# Patient Record
Sex: FEMALE | Race: WHITE | NOT HISPANIC OR LATINO
[De-identification: names, ages, dates, MRNs, and addresses within clinical notes are randomized per-mention and may not be internally consistent; named-entity substitution may affect disease eponyms.]

---

## 2020-07-27 ENCOUNTER — APPOINTMENT (OUTPATIENT)
Dept: THORACIC SURGERY | Facility: CLINIC | Age: 85
End: 2020-07-27
Payer: MEDICARE

## 2020-07-27 DIAGNOSIS — Z82.49 FAMILY HISTORY OF ISCHEMIC HEART DISEASE AND OTHER DISEASES OF THE CIRCULATORY SYSTEM: ICD-10-CM

## 2020-07-27 DIAGNOSIS — Z60.2 PROBLEMS RELATED TO LIVING ALONE: ICD-10-CM

## 2020-07-27 DIAGNOSIS — I10 ESSENTIAL (PRIMARY) HYPERTENSION: ICD-10-CM

## 2020-07-27 DIAGNOSIS — S22.39XA FRACTURE OF ONE RIB, UNSPECIFIED SIDE, INITIAL ENCOUNTER FOR CLOSED FRACTURE: ICD-10-CM

## 2020-07-27 DIAGNOSIS — Z85.3 PERSONAL HISTORY OF MALIGNANT NEOPLASM OF BREAST: ICD-10-CM

## 2020-07-27 DIAGNOSIS — Z87.891 PERSONAL HISTORY OF NICOTINE DEPENDENCE: ICD-10-CM

## 2020-07-27 DIAGNOSIS — Z86.39 PERSONAL HISTORY OF OTHER ENDOCRINE, NUTRITIONAL AND METABOLIC DISEASE: ICD-10-CM

## 2020-07-27 PROCEDURE — 99443: CPT

## 2020-07-27 RX ORDER — ATORVASTATIN CALCIUM 20 MG/1
20 TABLET, FILM COATED ORAL
Refills: 0 | Status: ACTIVE | COMMUNITY

## 2020-07-27 RX ORDER — LISINOPRIL 10 MG/1
10 TABLET ORAL
Refills: 0 | Status: ACTIVE | COMMUNITY

## 2020-07-27 RX ORDER — DILTIAZEM HYDROCHLORIDE 300 MG/1
300 TABLET, EXTENDED RELEASE ORAL
Refills: 0 | Status: ACTIVE | COMMUNITY

## 2020-07-27 SDOH — SOCIAL STABILITY - SOCIAL INSECURITY: PROBLEMS RELATED TO LIVING ALONE: Z60.2

## 2021-06-09 ENCOUNTER — NON-APPOINTMENT (OUTPATIENT)
Age: 86
End: 2021-06-09

## 2021-06-09 ENCOUNTER — APPOINTMENT (OUTPATIENT)
Dept: OPHTHALMOLOGY | Facility: CLINIC | Age: 86
End: 2021-06-09
Payer: MEDICARE

## 2021-06-09 PROCEDURE — 92134 CPTRZ OPH DX IMG PST SGM RTA: CPT

## 2021-06-09 PROCEDURE — 67028 INJECTION EYE DRUG: CPT | Mod: LT

## 2021-06-09 PROCEDURE — 99072 ADDL SUPL MATRL&STAF TM PHE: CPT

## 2021-07-15 ENCOUNTER — NON-APPOINTMENT (OUTPATIENT)
Age: 86
End: 2021-07-15

## 2021-07-15 ENCOUNTER — APPOINTMENT (OUTPATIENT)
Dept: OPHTHALMOLOGY | Facility: CLINIC | Age: 86
End: 2021-07-15
Payer: MEDICARE

## 2021-07-15 PROCEDURE — 99072 ADDL SUPL MATRL&STAF TM PHE: CPT

## 2021-07-15 PROCEDURE — 67028 INJECTION EYE DRUG: CPT | Mod: LT

## 2021-09-09 ENCOUNTER — NON-APPOINTMENT (OUTPATIENT)
Age: 86
End: 2021-09-09

## 2021-09-09 ENCOUNTER — APPOINTMENT (OUTPATIENT)
Dept: OPHTHALMOLOGY | Facility: CLINIC | Age: 86
End: 2021-09-09
Payer: MEDICARE

## 2021-09-09 PROCEDURE — 92134 CPTRZ OPH DX IMG PST SGM RTA: CPT

## 2021-09-09 PROCEDURE — 67028 INJECTION EYE DRUG: CPT | Mod: LT

## 2021-10-14 ENCOUNTER — APPOINTMENT (OUTPATIENT)
Dept: OPHTHALMOLOGY | Facility: CLINIC | Age: 86
End: 2021-10-14
Payer: MEDICARE

## 2021-10-14 ENCOUNTER — NON-APPOINTMENT (OUTPATIENT)
Age: 86
End: 2021-10-14

## 2021-10-14 PROCEDURE — 92012 INTRM OPH EXAM EST PATIENT: CPT

## 2021-10-14 PROCEDURE — 92134 CPTRZ OPH DX IMG PST SGM RTA: CPT

## 2021-12-02 ENCOUNTER — NON-APPOINTMENT (OUTPATIENT)
Age: 86
End: 2021-12-02

## 2021-12-02 ENCOUNTER — APPOINTMENT (OUTPATIENT)
Dept: OPHTHALMOLOGY | Facility: CLINIC | Age: 86
End: 2021-12-02
Payer: MEDICARE

## 2021-12-02 PROCEDURE — 67028 INJECTION EYE DRUG: CPT | Mod: LT

## 2021-12-02 PROCEDURE — 92134 CPTRZ OPH DX IMG PST SGM RTA: CPT

## 2022-01-13 ENCOUNTER — NON-APPOINTMENT (OUTPATIENT)
Age: 87
End: 2022-01-13

## 2022-01-13 ENCOUNTER — APPOINTMENT (OUTPATIENT)
Dept: OPHTHALMOLOGY | Facility: CLINIC | Age: 87
End: 2022-01-13
Payer: MEDICARE

## 2022-01-13 PROCEDURE — 92134 CPTRZ OPH DX IMG PST SGM RTA: CPT

## 2022-01-13 PROCEDURE — 67028 INJECTION EYE DRUG: CPT | Mod: LT

## 2022-02-17 ENCOUNTER — APPOINTMENT (OUTPATIENT)
Dept: OPHTHALMOLOGY | Facility: CLINIC | Age: 87
End: 2022-02-17
Payer: MEDICARE

## 2022-02-17 ENCOUNTER — NON-APPOINTMENT (OUTPATIENT)
Age: 87
End: 2022-02-17

## 2022-02-17 PROCEDURE — 67028 INJECTION EYE DRUG: CPT | Mod: LT

## 2022-02-17 PROCEDURE — 92134 CPTRZ OPH DX IMG PST SGM RTA: CPT

## 2022-03-31 ENCOUNTER — NON-APPOINTMENT (OUTPATIENT)
Age: 87
End: 2022-03-31

## 2022-03-31 ENCOUNTER — APPOINTMENT (OUTPATIENT)
Dept: OPHTHALMOLOGY | Facility: CLINIC | Age: 87
End: 2022-03-31
Payer: MEDICARE

## 2022-03-31 PROCEDURE — 67028 INJECTION EYE DRUG: CPT | Mod: LT

## 2022-03-31 PROCEDURE — 92134 CPTRZ OPH DX IMG PST SGM RTA: CPT

## 2022-05-12 ENCOUNTER — NON-APPOINTMENT (OUTPATIENT)
Age: 87
End: 2022-05-12

## 2022-05-12 ENCOUNTER — APPOINTMENT (OUTPATIENT)
Dept: OPHTHALMOLOGY | Facility: CLINIC | Age: 87
End: 2022-05-12
Payer: MEDICARE

## 2022-05-12 PROCEDURE — 92134 CPTRZ OPH DX IMG PST SGM RTA: CPT

## 2022-05-12 PROCEDURE — 67028 INJECTION EYE DRUG: CPT | Mod: LT

## 2022-06-23 ENCOUNTER — NON-APPOINTMENT (OUTPATIENT)
Age: 87
End: 2022-06-23

## 2022-06-23 ENCOUNTER — APPOINTMENT (OUTPATIENT)
Dept: OPHTHALMOLOGY | Facility: CLINIC | Age: 87
End: 2022-06-23
Payer: MEDICARE

## 2022-06-23 PROCEDURE — 92134 CPTRZ OPH DX IMG PST SGM RTA: CPT

## 2022-06-23 PROCEDURE — 67028 INJECTION EYE DRUG: CPT | Mod: LT

## 2022-07-28 ENCOUNTER — NON-APPOINTMENT (OUTPATIENT)
Age: 87
End: 2022-07-28

## 2022-07-28 ENCOUNTER — APPOINTMENT (OUTPATIENT)
Dept: OPHTHALMOLOGY | Facility: CLINIC | Age: 87
End: 2022-07-28
Payer: MEDICARE

## 2022-07-28 PROCEDURE — 67028 INJECTION EYE DRUG: CPT | Mod: LT

## 2022-07-28 PROCEDURE — 92134 CPTRZ OPH DX IMG PST SGM RTA: CPT

## 2022-09-01 ENCOUNTER — NON-APPOINTMENT (OUTPATIENT)
Age: 87
End: 2022-09-01

## 2022-09-01 ENCOUNTER — APPOINTMENT (OUTPATIENT)
Dept: OPHTHALMOLOGY | Facility: CLINIC | Age: 87
End: 2022-09-01
Payer: MEDICARE

## 2022-09-01 PROCEDURE — 92134 CPTRZ OPH DX IMG PST SGM RTA: CPT

## 2022-09-01 PROCEDURE — 67028 INJECTION EYE DRUG: CPT | Mod: LT

## 2022-10-06 ENCOUNTER — NON-APPOINTMENT (OUTPATIENT)
Age: 87
End: 2022-10-06

## 2022-10-06 ENCOUNTER — APPOINTMENT (OUTPATIENT)
Dept: OPHTHALMOLOGY | Facility: CLINIC | Age: 87
End: 2022-10-06

## 2022-10-06 PROCEDURE — 67028 INJECTION EYE DRUG: CPT | Mod: LT

## 2022-10-06 PROCEDURE — 92134 CPTRZ OPH DX IMG PST SGM RTA: CPT

## 2022-11-03 ENCOUNTER — APPOINTMENT (OUTPATIENT)
Dept: OPHTHALMOLOGY | Facility: CLINIC | Age: 87
End: 2022-11-03

## 2022-11-03 ENCOUNTER — NON-APPOINTMENT (OUTPATIENT)
Age: 87
End: 2022-11-03

## 2022-11-03 PROCEDURE — 67028 INJECTION EYE DRUG: CPT | Mod: LT

## 2022-11-03 PROCEDURE — 92134 CPTRZ OPH DX IMG PST SGM RTA: CPT

## 2022-12-08 ENCOUNTER — APPOINTMENT (OUTPATIENT)
Dept: OPHTHALMOLOGY | Facility: CLINIC | Age: 87
End: 2022-12-08

## 2022-12-08 ENCOUNTER — NON-APPOINTMENT (OUTPATIENT)
Age: 87
End: 2022-12-08

## 2022-12-08 PROCEDURE — 92134 CPTRZ OPH DX IMG PST SGM RTA: CPT

## 2022-12-08 PROCEDURE — 67028 INJECTION EYE DRUG: CPT | Mod: LT

## 2023-01-12 ENCOUNTER — APPOINTMENT (OUTPATIENT)
Dept: OPHTHALMOLOGY | Facility: CLINIC | Age: 88
End: 2023-01-12
Payer: MEDICARE

## 2023-01-12 ENCOUNTER — NON-APPOINTMENT (OUTPATIENT)
Age: 88
End: 2023-01-12

## 2023-01-12 PROCEDURE — 92134 CPTRZ OPH DX IMG PST SGM RTA: CPT

## 2023-01-12 PROCEDURE — 67028 INJECTION EYE DRUG: CPT | Mod: LT

## 2023-02-16 ENCOUNTER — NON-APPOINTMENT (OUTPATIENT)
Age: 88
End: 2023-02-16

## 2023-02-16 ENCOUNTER — APPOINTMENT (OUTPATIENT)
Dept: OPHTHALMOLOGY | Facility: CLINIC | Age: 88
End: 2023-02-16
Payer: MEDICARE

## 2023-02-16 PROCEDURE — 92134 CPTRZ OPH DX IMG PST SGM RTA: CPT

## 2023-02-16 PROCEDURE — 92014 COMPRE OPH EXAM EST PT 1/>: CPT

## 2023-03-16 ENCOUNTER — APPOINTMENT (OUTPATIENT)
Dept: OPHTHALMOLOGY | Facility: CLINIC | Age: 88
End: 2023-03-16
Payer: MEDICARE

## 2023-03-16 ENCOUNTER — NON-APPOINTMENT (OUTPATIENT)
Age: 88
End: 2023-03-16

## 2023-03-16 PROCEDURE — 92134 CPTRZ OPH DX IMG PST SGM RTA: CPT

## 2023-03-16 PROCEDURE — 67028 INJECTION EYE DRUG: CPT | Mod: LT

## 2023-04-27 ENCOUNTER — NON-APPOINTMENT (OUTPATIENT)
Age: 88
End: 2023-04-27

## 2023-04-27 ENCOUNTER — APPOINTMENT (OUTPATIENT)
Dept: OPHTHALMOLOGY | Facility: CLINIC | Age: 88
End: 2023-04-27
Payer: MEDICARE

## 2023-04-27 PROCEDURE — 92134 CPTRZ OPH DX IMG PST SGM RTA: CPT

## 2023-04-27 PROCEDURE — 92012 INTRM OPH EXAM EST PATIENT: CPT

## 2023-05-09 ENCOUNTER — INPATIENT (INPATIENT)
Facility: HOSPITAL | Age: 88
LOS: 6 days | Discharge: NURSING, REHAB OR LONG-TERM CARE | DRG: 291 | End: 2023-05-16
Attending: HOSPITALIST | Admitting: FAMILY MEDICINE
Payer: MEDICARE

## 2023-05-09 VITALS
DIASTOLIC BLOOD PRESSURE: 98 MMHG | OXYGEN SATURATION: 98 % | SYSTOLIC BLOOD PRESSURE: 150 MMHG | WEIGHT: 130.07 LBS | HEIGHT: 64 IN | RESPIRATION RATE: 20 BRPM | HEART RATE: 145 BPM | TEMPERATURE: 98 F

## 2023-05-09 DIAGNOSIS — I50.9 HEART FAILURE, UNSPECIFIED: ICD-10-CM

## 2023-05-09 DIAGNOSIS — Z90.12 ACQUIRED ABSENCE OF LEFT BREAST AND NIPPLE: Chronic | ICD-10-CM

## 2023-05-09 LAB
ALBUMIN SERPL ELPH-MCNC: 4.2 G/DL — SIGNIFICANT CHANGE UP (ref 3.3–5.2)
ALP SERPL-CCNC: 90 U/L — SIGNIFICANT CHANGE UP (ref 40–120)
ALT FLD-CCNC: 41 U/L — HIGH
ANION GAP SERPL CALC-SCNC: 15 MMOL/L — SIGNIFICANT CHANGE UP (ref 5–17)
AST SERPL-CCNC: 26 U/L — SIGNIFICANT CHANGE UP
BASOPHILS # BLD AUTO: 0.04 K/UL — SIGNIFICANT CHANGE UP (ref 0–0.2)
BASOPHILS NFR BLD AUTO: 0.6 % — SIGNIFICANT CHANGE UP (ref 0–2)
BILIRUB SERPL-MCNC: 1.1 MG/DL — SIGNIFICANT CHANGE UP (ref 0.4–2)
BUN SERPL-MCNC: 25.8 MG/DL — HIGH (ref 8–20)
CALCIUM SERPL-MCNC: 9 MG/DL — SIGNIFICANT CHANGE UP (ref 8.4–10.5)
CHLORIDE SERPL-SCNC: 103 MMOL/L — SIGNIFICANT CHANGE UP (ref 96–108)
CO2 SERPL-SCNC: 22 MMOL/L — SIGNIFICANT CHANGE UP (ref 22–29)
CREAT SERPL-MCNC: 0.97 MG/DL — SIGNIFICANT CHANGE UP (ref 0.5–1.3)
EGFR: 55 ML/MIN/1.73M2 — LOW
EOSINOPHIL # BLD AUTO: 0.03 K/UL — SIGNIFICANT CHANGE UP (ref 0–0.5)
EOSINOPHIL NFR BLD AUTO: 0.5 % — SIGNIFICANT CHANGE UP (ref 0–6)
GLUCOSE SERPL-MCNC: 109 MG/DL — HIGH (ref 70–99)
HCT VFR BLD CALC: 43.1 % — SIGNIFICANT CHANGE UP (ref 34.5–45)
HGB BLD-MCNC: 14.1 G/DL — SIGNIFICANT CHANGE UP (ref 11.5–15.5)
IMM GRANULOCYTES NFR BLD AUTO: 0.6 % — SIGNIFICANT CHANGE UP (ref 0–0.9)
LYMPHOCYTES # BLD AUTO: 1.74 K/UL — SIGNIFICANT CHANGE UP (ref 1–3.3)
LYMPHOCYTES # BLD AUTO: 27.4 % — SIGNIFICANT CHANGE UP (ref 13–44)
MAGNESIUM SERPL-MCNC: 2 MG/DL — SIGNIFICANT CHANGE UP (ref 1.6–2.6)
MCHC RBC-ENTMCNC: 28.4 PG — SIGNIFICANT CHANGE UP (ref 27–34)
MCHC RBC-ENTMCNC: 32.7 GM/DL — SIGNIFICANT CHANGE UP (ref 32–36)
MCV RBC AUTO: 86.9 FL — SIGNIFICANT CHANGE UP (ref 80–100)
MONOCYTES # BLD AUTO: 0.61 K/UL — SIGNIFICANT CHANGE UP (ref 0–0.9)
MONOCYTES NFR BLD AUTO: 9.6 % — SIGNIFICANT CHANGE UP (ref 2–14)
NEUTROPHILS # BLD AUTO: 3.88 K/UL — SIGNIFICANT CHANGE UP (ref 1.8–7.4)
NEUTROPHILS NFR BLD AUTO: 61.3 % — SIGNIFICANT CHANGE UP (ref 43–77)
NT-PROBNP SERPL-SCNC: 4798 PG/ML — HIGH (ref 0–300)
PLATELET # BLD AUTO: 125 K/UL — LOW (ref 150–400)
POTASSIUM SERPL-MCNC: 4.2 MMOL/L — SIGNIFICANT CHANGE UP (ref 3.5–5.3)
POTASSIUM SERPL-SCNC: 4.2 MMOL/L — SIGNIFICANT CHANGE UP (ref 3.5–5.3)
PROT SERPL-MCNC: 6.5 G/DL — LOW (ref 6.6–8.7)
RAPID RVP RESULT: SIGNIFICANT CHANGE UP
RBC # BLD: 4.96 M/UL — SIGNIFICANT CHANGE UP (ref 3.8–5.2)
RBC # FLD: 14.8 % — HIGH (ref 10.3–14.5)
SARS-COV-2 RNA SPEC QL NAA+PROBE: SIGNIFICANT CHANGE UP
SODIUM SERPL-SCNC: 140 MMOL/L — SIGNIFICANT CHANGE UP (ref 135–145)
TROPONIN T SERPL-MCNC: <0.01 NG/ML — SIGNIFICANT CHANGE UP (ref 0–0.06)
TSH SERPL-MCNC: 1.5 UIU/ML — SIGNIFICANT CHANGE UP (ref 0.27–4.2)
WBC # BLD: 6.34 K/UL — SIGNIFICANT CHANGE UP (ref 3.8–10.5)
WBC # FLD AUTO: 6.34 K/UL — SIGNIFICANT CHANGE UP (ref 3.8–10.5)

## 2023-05-09 PROCEDURE — 93010 ELECTROCARDIOGRAM REPORT: CPT | Mod: 76

## 2023-05-09 PROCEDURE — 71045 X-RAY EXAM CHEST 1 VIEW: CPT | Mod: 26

## 2023-05-09 PROCEDURE — 99223 1ST HOSP IP/OBS HIGH 75: CPT

## 2023-05-09 PROCEDURE — G1004: CPT

## 2023-05-09 PROCEDURE — 99285 EMERGENCY DEPT VISIT HI MDM: CPT

## 2023-05-09 PROCEDURE — 71275 CT ANGIOGRAPHY CHEST: CPT | Mod: 26,MG

## 2023-05-09 RX ORDER — ATORVASTATIN CALCIUM 80 MG/1
20 TABLET, FILM COATED ORAL AT BEDTIME
Refills: 0 | Status: DISCONTINUED | OUTPATIENT
Start: 2023-05-09 | End: 2023-05-16

## 2023-05-09 RX ORDER — LISINOPRIL 2.5 MG/1
10 TABLET ORAL DAILY
Refills: 0 | Status: DISCONTINUED | OUTPATIENT
Start: 2023-05-10 | End: 2023-05-10

## 2023-05-09 RX ORDER — DILTIAZEM HCL 120 MG
90 CAPSULE, EXT RELEASE 24 HR ORAL ONCE
Refills: 0 | Status: COMPLETED | OUTPATIENT
Start: 2023-05-09 | End: 2023-05-09

## 2023-05-09 RX ORDER — LISINOPRIL 2.5 MG/1
0 TABLET ORAL
Refills: 0 | DISCHARGE

## 2023-05-09 RX ORDER — DILTIAZEM HCL 120 MG
0 CAPSULE, EXT RELEASE 24 HR ORAL
Refills: 0 | DISCHARGE

## 2023-05-09 RX ORDER — ACETAMINOPHEN 500 MG
650 TABLET ORAL EVERY 6 HOURS
Refills: 0 | Status: DISCONTINUED | OUTPATIENT
Start: 2023-05-09 | End: 2023-05-16

## 2023-05-09 RX ORDER — FUROSEMIDE 40 MG
40 TABLET ORAL ONCE
Refills: 0 | Status: COMPLETED | OUTPATIENT
Start: 2023-05-09 | End: 2023-05-09

## 2023-05-09 RX ORDER — DILTIAZEM HCL 120 MG
180 CAPSULE, EXT RELEASE 24 HR ORAL DAILY
Refills: 0 | Status: DISCONTINUED | OUTPATIENT
Start: 2023-05-10 | End: 2023-05-13

## 2023-05-09 RX ORDER — DILTIAZEM HCL 120 MG
10 CAPSULE, EXT RELEASE 24 HR ORAL ONCE
Refills: 0 | Status: COMPLETED | OUTPATIENT
Start: 2023-05-09 | End: 2023-05-09

## 2023-05-09 RX ORDER — TRAMADOL HYDROCHLORIDE 50 MG/1
1 TABLET ORAL
Refills: 0 | DISCHARGE

## 2023-05-09 RX ORDER — METOPROLOL TARTRATE 50 MG
0 TABLET ORAL
Refills: 0 | DISCHARGE

## 2023-05-09 RX ORDER — APIXABAN 2.5 MG/1
2.5 TABLET, FILM COATED ORAL
Refills: 0 | Status: DISCONTINUED | OUTPATIENT
Start: 2023-05-09 | End: 2023-05-16

## 2023-05-09 RX ORDER — ONDANSETRON 8 MG/1
4 TABLET, FILM COATED ORAL EVERY 6 HOURS
Refills: 0 | Status: DISCONTINUED | OUTPATIENT
Start: 2023-05-09 | End: 2023-05-16

## 2023-05-09 RX ORDER — IPRATROPIUM/ALBUTEROL SULFATE 18-103MCG
3 AEROSOL WITH ADAPTER (GRAM) INHALATION EVERY 6 HOURS
Refills: 0 | Status: DISCONTINUED | OUTPATIENT
Start: 2023-05-09 | End: 2023-05-16

## 2023-05-09 RX ORDER — METOPROLOL TARTRATE 50 MG
50 TABLET ORAL DAILY
Refills: 0 | Status: DISCONTINUED | OUTPATIENT
Start: 2023-05-09 | End: 2023-05-10

## 2023-05-09 RX ORDER — FUROSEMIDE 40 MG
40 TABLET ORAL EVERY 12 HOURS
Refills: 0 | Status: DISCONTINUED | OUTPATIENT
Start: 2023-05-10 | End: 2023-05-10

## 2023-05-09 RX ADMIN — Medication 40 MILLIGRAM(S): at 16:34

## 2023-05-09 RX ADMIN — Medication 10 MILLIGRAM(S): at 13:39

## 2023-05-09 RX ADMIN — Medication 50 MILLIGRAM(S): at 20:11

## 2023-05-09 RX ADMIN — Medication 90 MILLIGRAM(S): at 14:04

## 2023-05-09 RX ADMIN — ATORVASTATIN CALCIUM 20 MILLIGRAM(S): 80 TABLET, FILM COATED ORAL at 22:24

## 2023-05-09 NOTE — H&P ADULT - NSHPPHYSICALEXAM_GEN_ALL_CORE
Vital Signs  T(C): 36.6 (09 May 2023 12:29), Max: 36.6 (09 May 2023 12:29)  T(F): 97.8 (09 May 2023 12:29), Max: 97.8 (09 May 2023 12:29)  HR: 72 (09 May 2023 16:50) (72 - 145)  BP: 145/78 (09 May 2023 16:50) (138/90 - 150/98)  RR: 22 (09 May 2023 16:50) (20 - 22)  SpO2: 95% (09 May 2023 16:50) (95% - 98%)  Parameters below as of 09 May 2023 16:50  Patient On (Oxygen Delivery Method): room air  General: Elderly female sitting in bed comfortably. No acute distress  HEENT: EOMI. Clear conjunctivae. Moist mucus membrane  Neck: Supple.   Chest: Good air entry. Bibasilar rales. No wheezing or rhonchi.   Heart: S1 & S2 with irregular rhythm.   Abdomen: Obese. Soft. Non-tender. + BS  Ext: 1+ pedal edema. No calf tenderness   Neuro: AAO x 3. No focal deficit. No speech disorder.  Skin: Warm and Dry  Psychiatry: Normal mood and affect

## 2023-05-09 NOTE — ED ADULT NURSE NOTE - OBJECTIVE STATEMENT
pt comes to ED with reports of SOB x 2 weeks, bilat lower extremity edema noted to ankles on arrival. pt denies chest pain. pt skin warm and dry, afebrile rectally. tachycardic and tachypneic on arrival. pt with history of AF, on Eliquis.

## 2023-05-09 NOTE — ED ADULT TRIAGE NOTE - CHIEF COMPLAINT QUOTE
Pt send from cardiologist office for rapid HR reading 145 in the office. Hx of Rapid afib on Eliquis and Metoprolol. Pt taken to [facility] for further eval and treatement

## 2023-05-09 NOTE — H&P ADULT - NSHPLABSRESULTS_GEN_ALL_CORE
LABS:                        14.1   6.34  )-----------( 125      ( 09 May 2023 12:50 )             43.1     05-09    140  |  103  |  25.8<H>  ----------------------------<  109<H>  4.2   |  22.0  |  0.97    Ca    9.0      09 May 2023 12:50  Mg     2.0     05-09    TPro  6.5<L>  /  Alb  4.2  /  TBili  1.1  /  DBili  x   /  AST  26  /  ALT  41<H>  /  AlkPhos  90  05-09    CARDIAC MARKERS ( 09 May 2023 12:50 )  x     / <0.01 ng/mL / x     / x     / x         Xray Chest 1 View- PORTABLE-Urgent (Xray Chest 1 View- PORTABLE-Urgent .) (05.09.23 @ 13:33)     Mild CHF and small bilateral pleural effusions/atelectasis.   Cardiomegaly.    CT Angio Chest PE Protocol w/ IV Cont (05.09.23 @ 16:00)     1.  No pulmonary embolus.  2.  I lateral pleural effusions and lower lobe passive atelectasis.  3.  The right adrenal nodule is indeterminate. Characterization using MRI without and with contrast is recommended after the acute symptoms resolve.

## 2023-05-09 NOTE — H&P ADULT - ASSESSMENT
INCOMPLETE 89 year old female with history of Chronic A. Fib on Eliquis, HTN, HLD and Breast Cancer s/p Left Mastectomy sent by PMD for evaluation. As per patient, she had Flu 2-3 weeks ago and since then she has been having exertional dyspnea, orthopnea and paroxysmal nocturnal dyspnea. Denies lower extremity swelling. Denies chest pain however she has noticed palpitations at times. Today, she went to her PMD and was found to be in Rapid A. Fib so she was sent to the hospital.   In ER, she was found to be in Acute CHF with Rapid A. Fib with RVR. She was given Cardizem 10 mg IVP x 1, Cardizem 90 mg PO x 1 and Lasix 40 mg x 1.     1) Acute Heart Failure  - Trend cardiac enzymes   - ECHO   - Strict intake/output and daily weight  - Lasix 40 mg IVP BID  - Continue Lisinopril and Metoprolol  - Cardiology was consulted by ED    2) Chronic A. Fib with RVR  - Resume Cardizem  mg daily   - Resume Metoprolol ER 50 mg daily  - Resume Eliquis 2.5 mg BID  - Cardiology Consult     3) HTN  - Resume Lisinopril, Metoprolol and Cardizem     4) HLD  - Resume Lipitor     DVT Prophylaxis -- Patient is on Eliquis.    Dispo: [facility] in 2-3 days.

## 2023-05-09 NOTE — ED PROVIDER NOTE - CLINICAL SUMMARY MEDICAL DECISION MAKING FREE TEXT BOX
Patient with sob, LE edema. Arrives in afib with rvr. No hx of CHF in the past but clinically fluid over loaded. ANP elevated, effusions on cxr. Cards consult place to Dr. Jean who is patient's cardiologist. CT negative for PE. IV Lasix given. Patient admitted to tele.

## 2023-05-09 NOTE — H&P ADULT - HISTORY OF PRESENT ILLNESS
INCOMPLETE 89 year old female with history of Chronic A. Fib on Eliquis, HTN, HLD and Breast Cancer s/p Left Mastectomy sent by PMD for evaluation. As per patient, she had Flu 2-3 weeks ago and since then she has been having exertional dyspnea, orthopnea and paroxysmal nocturnal dyspnea. Denies lower extremity swelling. Denies chest pain however she has noticed palpitations at times. Today, she went to her PMD and was found to be in Rapid A. Fib so she was sent to the hospital.   In ER, she was found to be in Acute CHF with Rapid A. Fib with RVR. She was given Cardizem 10 mg IVP x 1, Cardizem 90 mg PO x 1 and Lasix 40 mg x 1.

## 2023-05-09 NOTE — ED PROVIDER NOTE - ATTENDING CONTRIBUTION TO CARE
The patient discussed with resident    Acute CHF    I, Rohit Solorio, performed the initial face to face bedside interview with this patient regarding history of present illness, review of symptoms and relevant past medical, social and family history.  I completed an independent physical examination.  I was the initial provider who evaluated this patient. I have signed out the follow up of any pending tests (i.e. labs, radiological studies) to the resident.  I have communicated the patient’s plan of care and disposition with the resident

## 2023-05-09 NOTE — ED PROVIDER NOTE - OBJECTIVE STATEMENT
89 yo female with hx of afib on Eliquis, HTN, HLD presents to the ED for SOB. Patient had a viral infection about 2 weeks ago and has since felt SOB. Worsening. Went to PMD for check up today and found in rapid afib and sent to ED. Patient denies chest pain, LE edema.

## 2023-05-09 NOTE — CONSULT NOTE ADULT - SUBJECTIVE AND OBJECTIVE BOX
Patient is a 89y old  Female who presents with a chief complaint of SOB    HPI:  89 yo female with hx of A-fib on Eliquis, HTN, and HLD who presents to the ED today for SOB. Patient had a viral infection about 2 weeks ago and has since felt SOB. Went to PMD for check up today and found to be in rapid afib and sent to ED        PAST MEDICAL & SURGICAL HISTORY:  Mild HTN  Chronic atrial fibrillation  History of left mastectomy          PREVIOUS DIAGNOSTIC TESTING:      ECHO 9/2021  FINDINGS: LVEF 55-60%, Mild AI, Mild- moderate MR, Mild TR.        Allergies  No Known Allergies        MEDICATIONS  (HOME):  · 	Cardizem  mg/24 hours oral capsule, extended release: Last Dose Taken:  , 1 orally  · 	metoprolol succinate 50 mg oral capsule, extended release: Last Dose Taken:  , 1 orally  · 	lisinopril 10 mg oral tablet: Last Dose Taken:  , 1 orally  · 	traMADol 50 mg oral tablet: 1 orally prn  · 	atorvastatin 20 mg oral tablet: Last Dose Taken:  , 1 orally  · 	Eliquis 2.5 mg oral tablet: Last Dose Taken:  , 1 orally 2 times a day          FAMILY HISTORY:  Family history of heart disease (Father)        SOCIAL HISTORY: Resident at [facility]    CIGARETTES: NO    ALCOHOL: DENIES      REVIEW OF SYSTEMS:  CONSTITUTIONAL: No fever, weight loss, or fatigue  EYES: No eye pain, visual disturbances, or discharge  ENMT:  No difficulty hearing, tinnitus, vertigo; No sinus or throat pain  NECK: No pain or stiffness  RESPIRATORY: No cough, wheezing, chills or hemoptysis; +Shortness of Breath  CARDIOVASCULAR: No chest pain, passing out, dizziness, + palpitations and +1 B/L LE edema  GASTROINTESTINAL: No abdominal or epigastric pain. No nausea, vomiting, or hematemesis; No diarrhea or constipation. No melena or hematochezia.  GENITOURINARY: No dysuria, frequency, hematuria, or incontinence  NEUROLOGICAL: No headaches, memory loss, loss of strength, numbness, or tremors  SKIN: No itching, burning, rashes, or lesions   ENDOCRINE: No heat or cold intolerance; No hair loss  MUSCULOSKELETAL: No joint pain or swelling; No muscle, back, or extremity pain  PSYCHIATRIC: No depression, anxiety, mood swings, or difficulty sleeping  HEME/LYMPH: No easy bruising, or bleeding gums  ALLERY AND IMMUNOLOGIC: No hives or eczema	    Vital Signs Last 24 Hrs  T(C): 36.6 (09 May 2023 12:29), Max: 36.6 (09 May 2023 12:29)  T(F): 97.8 (09 May 2023 12:29), Max: 97.8 (09 May 2023 12:29)  HR: 72 (09 May 2023 16:50) (72 - 145)  BP: 145/78 (09 May 2023 16:50) (138/90 - 150/98)  BP(mean): --  RR: 22 (09 May 2023 16:50) (20 - 22)  SpO2: 95% (09 May 2023 16:50) (95% - 98%)    Parameters below as of 09 May 2023 16:50  Patient On (Oxygen Delivery Method): room air        Daily Height in cm: 162.56 (09 May 2023 12:29)    Daily     I&O's Detail      PHYSICAL EXAM:  Appearance: Normal appearance	  HEENT:   Normal oral mucosa, PERRL, EOMI, sclera non-icteric	  Cardiovascular: Normal S1 S2, No JVD, 2/6 Systolic murmur, +1 B/L LE edema  Respiratory: Lungs bibasilar rales  Psychiatry: A & O x 3, Mood & affect appropriate  Gastrointestinal:  Soft, Non-tender, + BS, no bruits	  Skin: No rashes, No ecchymoses, No cyanosis  Neurologic: Grossly non-focal with full strength in all four extremities  Extremities: Normal range of motion, No clubbing, or cyanosis, +1 B/L LE edema  Vascular: Peripheral pulses palpable  bilaterally      INTERPRETATION OF TELEMETRY: A-Fib controlled rate     ECG:  A-Fib, controlled, LAFB, PVC's, non specific ST abnormalities          LABS:                        14.1   6.34  )-----------( 125      ( 09 May 2023 12:50 )             43.1     05-09    140  |  103  |  25.8<H>  ----------------------------<  109<H>  4.2   |  22.0  |  0.97    Ca    9.0      09 May 2023 12:50  Mg     2.0     05-09    TPro  6.5<L>  /  Alb  4.2  /  TBili  1.1  /  DBili  x   /  AST  26  /  ALT  41<H>  /  AlkPhos  90  05-09    CARDIAC MARKERS ( 09 May 2023 12:50 )  x     / <0.01 ng/mL / x     / x     / x                  BNP  RADIOLOGY & ADDITIONAL STUDIES:    < from: CT Angio Chest PE Protocol w/ IV Cont (05.09.23 @ 16:00) >    IMPRESSION:    1.  No pulmonary embolus.  2.  I lateral pleural effusions and lower lobe passive atelectasis.  3.  The right adrenal nodule is indeterminate. Characterization using MRI   without and with contrast is recommended after the acute symptoms resolve.      < end of copied text >      Assessment:  HPI:  89 yo female with hx of A-fib on Eliquis, HTN, and HLD who presents to the ED today for SOB. Patient had a viral infection about 2 weeks ago and has since felt SOB. Went to PMD for check up today and found to be in rapid afib and sent to ED    Pt seen in ER A & O x 3 denies chest pain, palpitations, and SOB.  A-Fib controlled rate on CM, BP stable, BNP 4798, Troponin negative   CT chest shows B/L pleural effusions, negative for PE.  Feeling much better after IV Lasix    Plan:  Acute exacerbation of CHF secondary to Rapid A-Fib  ECHO  Continue diuresis with IV Lasix daily  Cardizem  mg daily  Metoprolol Succinate 50 mg PO nightly  Eliquis 2.5 mg BID   Atorvastatin 20 mg PO nightly   Monitor renal function  Telemetry monitoring

## 2023-05-09 NOTE — H&P ADULT - NSHPSOCIALHISTORY_GEN_ALL_CORE
Resident of [facility].  Uses walker at times.  Former smoker, quit 20 years ago.  No alcohol or illicit drug use.

## 2023-05-10 LAB
ANION GAP SERPL CALC-SCNC: 15 MMOL/L — SIGNIFICANT CHANGE UP (ref 5–17)
BUN SERPL-MCNC: 26.9 MG/DL — HIGH (ref 8–20)
CALCIUM SERPL-MCNC: 9.2 MG/DL — SIGNIFICANT CHANGE UP (ref 8.4–10.5)
CHLORIDE SERPL-SCNC: 102 MMOL/L — SIGNIFICANT CHANGE UP (ref 96–108)
CK SERPL-CCNC: 47 U/L — SIGNIFICANT CHANGE UP (ref 25–170)
CO2 SERPL-SCNC: 24 MMOL/L — SIGNIFICANT CHANGE UP (ref 22–29)
CREAT SERPL-MCNC: 0.92 MG/DL — SIGNIFICANT CHANGE UP (ref 0.5–1.3)
EGFR: 59 ML/MIN/1.73M2 — LOW
GLUCOSE SERPL-MCNC: 134 MG/DL — HIGH (ref 70–99)
HCT VFR BLD CALC: 40.2 % — SIGNIFICANT CHANGE UP (ref 34.5–45)
HGB BLD-MCNC: 13.5 G/DL — SIGNIFICANT CHANGE UP (ref 11.5–15.5)
MAGNESIUM SERPL-MCNC: 2 MG/DL — SIGNIFICANT CHANGE UP (ref 1.8–2.6)
MCHC RBC-ENTMCNC: 28.6 PG — SIGNIFICANT CHANGE UP (ref 27–34)
MCHC RBC-ENTMCNC: 33.6 GM/DL — SIGNIFICANT CHANGE UP (ref 32–36)
MCV RBC AUTO: 85.2 FL — SIGNIFICANT CHANGE UP (ref 80–100)
PLATELET # BLD AUTO: 132 K/UL — LOW (ref 150–400)
POTASSIUM SERPL-MCNC: 4 MMOL/L — SIGNIFICANT CHANGE UP (ref 3.5–5.3)
POTASSIUM SERPL-SCNC: 4 MMOL/L — SIGNIFICANT CHANGE UP (ref 3.5–5.3)
RBC # BLD: 4.72 M/UL — SIGNIFICANT CHANGE UP (ref 3.8–5.2)
RBC # FLD: 15 % — HIGH (ref 10.3–14.5)
SODIUM SERPL-SCNC: 140 MMOL/L — SIGNIFICANT CHANGE UP (ref 135–145)
TROPONIN T SERPL-MCNC: <0.01 NG/ML — SIGNIFICANT CHANGE UP (ref 0–0.06)
WBC # BLD: 6 K/UL — SIGNIFICANT CHANGE UP (ref 3.8–10.5)
WBC # FLD AUTO: 6 K/UL — SIGNIFICANT CHANGE UP (ref 3.8–10.5)

## 2023-05-10 PROCEDURE — 99232 SBSQ HOSP IP/OBS MODERATE 35: CPT

## 2023-05-10 RX ORDER — METOPROLOL TARTRATE 50 MG
5 TABLET ORAL ONCE
Refills: 0 | Status: COMPLETED | OUTPATIENT
Start: 2023-05-10 | End: 2023-05-10

## 2023-05-10 RX ORDER — METOPROLOL TARTRATE 50 MG
5 TABLET ORAL EVERY 6 HOURS
Refills: 0 | Status: DISCONTINUED | OUTPATIENT
Start: 2023-05-10 | End: 2023-05-11

## 2023-05-10 RX ORDER — METOPROLOL TARTRATE 50 MG
50 TABLET ORAL
Refills: 0 | Status: DISCONTINUED | OUTPATIENT
Start: 2023-05-10 | End: 2023-05-16

## 2023-05-10 RX ORDER — FUROSEMIDE 40 MG
40 TABLET ORAL DAILY
Refills: 0 | Status: DISCONTINUED | OUTPATIENT
Start: 2023-05-11 | End: 2023-05-13

## 2023-05-10 RX ADMIN — Medication 40 MILLIGRAM(S): at 05:28

## 2023-05-10 RX ADMIN — LISINOPRIL 10 MILLIGRAM(S): 2.5 TABLET ORAL at 05:29

## 2023-05-10 RX ADMIN — Medication 5 MILLIGRAM(S): at 17:37

## 2023-05-10 RX ADMIN — APIXABAN 2.5 MILLIGRAM(S): 2.5 TABLET, FILM COATED ORAL at 05:29

## 2023-05-10 RX ADMIN — Medication 50 MILLIGRAM(S): at 22:01

## 2023-05-10 RX ADMIN — Medication 5 MILLIGRAM(S): at 00:19

## 2023-05-10 RX ADMIN — Medication 50 MILLIGRAM(S): at 05:29

## 2023-05-10 RX ADMIN — APIXABAN 2.5 MILLIGRAM(S): 2.5 TABLET, FILM COATED ORAL at 17:40

## 2023-05-10 RX ADMIN — Medication 180 MILLIGRAM(S): at 05:30

## 2023-05-10 RX ADMIN — ATORVASTATIN CALCIUM 20 MILLIGRAM(S): 80 TABLET, FILM COATED ORAL at 22:01

## 2023-05-10 NOTE — PROGRESS NOTE ADULT - SUBJECTIVE AND OBJECTIVE BOX
Heart failure    HPI:  89 year old female with history of Chronic A. Fib on Eliquis, HTN, HLD and Breast Cancer s/p Left Mastectomy sent by PMD for evaluation. As per patient, she had Flu 2-3 weeks ago and since then she has been having exertional dyspnea, orthopnea and paroxysmal nocturnal dyspnea. Denies lower extremity swelling. Denies chest pain however she has noticed palpitations at times. Today, she went to her PMD and was found to be in Rapid A. Fib so she was sent to the hospital.   In ER, she was found to be in Acute CHF with Rapid A. Fib with RVR. She was given Cardizem 10 mg IVP x 1, Cardizem 90 mg PO x 1 and Lasix 40 mg x 1.  (09 May 2023 17:31)    Interval History:  Patient was seen and examined at bedside around 9:30 am.  Was given a dose of Metoprolol IVP overnight for rapid atrial fibrillation.   Breathing is better.   Denies chest pain, palpitations, headache, dizziness, visual symptoms, nausea, vomiting or abdominal pain.    ROS:  As per interval history otherwise unremarkable.    PHYSICAL EXAM:  Vital Signs   T(C): 36.7 (10 May 2023 15:41), Max: 36.9 (10 May 2023 04:49)  T(F): 98.1 (10 May 2023 15:41), Max: 98.4 (10 May 2023 04:49)  HR: 108 (10 May 2023 15:41) (74 - 146)  BP: 114/75 (10 May 2023 15:41) (114/75 - 145/85)  RR: 18 (10 May 2023 15:41) (18 - 22)  SpO2: 94% (10 May 2023 15:41) (93% - 96%)  Parameters below as of 10 May 2023 15:41  Patient On (Oxygen Delivery Method): room air  General: Elderly female sitting in bed comfortably. No acute distress  HEENT: EOMI. Clear conjunctivae. Moist mucus membrane  Neck: Supple.   Chest: Good air entry. Bibasilar rales. No wheezing or rhonchi.   Heart: S1 & S2 with irregular rhythm.   Abdomen: Obese. Soft. Non-tender. + BS  Ext: No pedal edema. No calf tenderness   Neuro: AAO x 3. No focal deficit. No speech disorder.  Skin: Warm and Dry  Psychiatry: Normal mood and affect  I&O's Summary    09 May 2023 07:01  -  10 May 2023 07:00  --------------------------------------------------------  IN: 0 mL / OUT: 1000 mL / NET: -1000 mL    10 May 2023 07:01  -  10 May 2023 17:21  --------------------------------------------------------  IN: 0 mL / OUT: 1000 mL / NET: -1000 mL    LABS:               13.5   6.00  )-----------( 132      ( 10 May 2023 06:39 )             40.2     05-10    140  |  102  |  26.9<H>  ----------------------------<  134<H>  4.0   |  24.0  |  0.92    Ca    9.2      10 May 2023 06:39  Mg     2.0     05-10    TPro  6.5<L>  /  Alb  4.2  /  TBili  1.1  /  DBili  x   /  AST  26  /  ALT  41<H>  /  AlkPhos  90  05-09    RADIOLOGY & ADDITIONAL STUDIES:  Xray Chest 1 View- PORTABLE-Urgent (Xray Chest 1 View- PORTABLE-Urgent .) (05.09.23 @ 13:33)     Mild CHF and small bilateral pleural effusions/atelectasis.   Cardiomegaly.    CT Angio Chest PE Protocol w/ IV Cont (05.09.23 @ 16:00)     1.  No pulmonary embolus.  2.  I lateral pleural effusions and lower lobe passive atelectasis.  3.  The right adrenal nodule is indeterminate. Characterization using MRI without and with contrast is recommended after the acute symptoms resolve.    MEDICATIONS  (STANDING):  apixaban 2.5 milliGRAM(s) Oral two times a day  atorvastatin 20 milliGRAM(s) Oral at bedtime  diltiazem    milliGRAM(s) Oral daily  metoprolol succinate ER 50 milliGRAM(s) Oral two times a day    MEDICATIONS  (PRN):  acetaminophen     Tablet .. 650 milliGRAM(s) Oral every 6 hours PRN Temp greater or equal to 38C (100.4F), Mild Pain (1 - 3), Moderate Pain (4 - 6)  albuterol/ipratropium for Nebulization 3 milliLiter(s) Nebulizer every 6 hours PRN Shortness of Breath and/or Wheezing  metoprolol tartrate Injectable 5 milliGRAM(s) IV Push every 6 hours PRN If HR persistently > 120  ondansetron Injectable 4 milliGRAM(s) IV Push every 6 hours PRN Nausea and/or Vomiting

## 2023-05-11 LAB
ANION GAP SERPL CALC-SCNC: 14 MMOL/L — SIGNIFICANT CHANGE UP (ref 5–17)
BUN SERPL-MCNC: 32.5 MG/DL — HIGH (ref 8–20)
CALCIUM SERPL-MCNC: 8.8 MG/DL — SIGNIFICANT CHANGE UP (ref 8.4–10.5)
CHLORIDE SERPL-SCNC: 103 MMOL/L — SIGNIFICANT CHANGE UP (ref 96–108)
CO2 SERPL-SCNC: 23 MMOL/L — SIGNIFICANT CHANGE UP (ref 22–29)
CREAT SERPL-MCNC: 1.03 MG/DL — SIGNIFICANT CHANGE UP (ref 0.5–1.3)
EGFR: 51 ML/MIN/1.73M2 — LOW
GLUCOSE SERPL-MCNC: 100 MG/DL — HIGH (ref 70–99)
MAGNESIUM SERPL-MCNC: 2 MG/DL — SIGNIFICANT CHANGE UP (ref 1.8–2.6)
MRSA PCR RESULT.: SIGNIFICANT CHANGE UP
POTASSIUM SERPL-MCNC: 3.6 MMOL/L — SIGNIFICANT CHANGE UP (ref 3.5–5.3)
POTASSIUM SERPL-SCNC: 3.6 MMOL/L — SIGNIFICANT CHANGE UP (ref 3.5–5.3)
S AUREUS DNA NOSE QL NAA+PROBE: SIGNIFICANT CHANGE UP
SODIUM SERPL-SCNC: 140 MMOL/L — SIGNIFICANT CHANGE UP (ref 135–145)

## 2023-05-11 PROCEDURE — 99233 SBSQ HOSP IP/OBS HIGH 50: CPT

## 2023-05-11 PROCEDURE — 93306 TTE W/DOPPLER COMPLETE: CPT | Mod: 26

## 2023-05-11 RX ORDER — DILTIAZEM HCL 120 MG
10 CAPSULE, EXT RELEASE 24 HR ORAL EVERY 4 HOURS
Refills: 0 | Status: DISCONTINUED | OUTPATIENT
Start: 2023-05-11 | End: 2023-05-13

## 2023-05-11 RX ORDER — DILTIAZEM HCL 120 MG
10 CAPSULE, EXT RELEASE 24 HR ORAL ONCE
Refills: 0 | Status: COMPLETED | OUTPATIENT
Start: 2023-05-11 | End: 2023-05-11

## 2023-05-11 RX ORDER — CHLORHEXIDINE GLUCONATE 213 G/1000ML
1 SOLUTION TOPICAL
Refills: 0 | Status: DISCONTINUED | OUTPATIENT
Start: 2023-05-11 | End: 2023-05-16

## 2023-05-11 RX ORDER — FUROSEMIDE 40 MG
20 TABLET ORAL ONCE
Refills: 0 | Status: COMPLETED | OUTPATIENT
Start: 2023-05-11 | End: 2023-05-12

## 2023-05-11 RX ADMIN — Medication 5 MILLIGRAM(S): at 14:41

## 2023-05-11 RX ADMIN — Medication 50 MILLIGRAM(S): at 05:39

## 2023-05-11 RX ADMIN — Medication 180 MILLIGRAM(S): at 05:44

## 2023-05-11 RX ADMIN — Medication 5 MILLIGRAM(S): at 08:35

## 2023-05-11 RX ADMIN — ATORVASTATIN CALCIUM 20 MILLIGRAM(S): 80 TABLET, FILM COATED ORAL at 22:22

## 2023-05-11 RX ADMIN — APIXABAN 2.5 MILLIGRAM(S): 2.5 TABLET, FILM COATED ORAL at 17:35

## 2023-05-11 RX ADMIN — Medication 50 MILLIGRAM(S): at 17:35

## 2023-05-11 RX ADMIN — Medication 40 MILLIGRAM(S): at 05:39

## 2023-05-11 RX ADMIN — APIXABAN 2.5 MILLIGRAM(S): 2.5 TABLET, FILM COATED ORAL at 05:39

## 2023-05-11 RX ADMIN — Medication 10 MILLIGRAM(S): at 18:48

## 2023-05-11 RX ADMIN — CHLORHEXIDINE GLUCONATE 1 APPLICATION(S): 213 SOLUTION TOPICAL at 08:38

## 2023-05-11 NOTE — PROGRESS NOTE ADULT - SUBJECTIVE AND OBJECTIVE BOX
CC: Shortness of breath     INTERVAL HPI/OVERNIGHT EVENTS: Patient seen and examined. C/O SOB this am with HR's in 140's. Received IV Lopressor x 1 with improved rate control and resolution of . Sitting up in chair. Denies chest pain, dizziness, lightheadedness, nausea, vomiting, fever, chills. Reports having recent viral infection. No cough or sputum.     Vital Signs Last 24 Hrs  T(C): 36.5 (11 May 2023 12:17), Max: 36.7 (10 May 2023 15:41)  T(F): 97.7 (11 May 2023 12:17), Max: 98.1 (10 May 2023 15:41)  HR: 74 (11 May 2023 12:17) (71 - 125)  BP: 96/68 (11 May 2023 12:) (96/68 - 132/85)  BP(mean): --  RR: 17 (11 May 2023 12:17) (17 - 20)  SpO2: 99% (11 May 2023 12:17) (93% - 99%)    Parameters below as of 11 May 2023 12:17  Patient On (Oxygen Delivery Method): nasal cannula  O2 Flow (L/min): 3    General:  No acute distress, sitting in chair.  HEENT: EOMI. Clear conjunctivae. Moist mucus membrane  Neck: Supple.   Chest: Good air entry. Bibasilar rales. No wheezing or rhonchi.   Heart: S1 & S2 with irregular rhythm.   Abdomen: Obese. Soft. Non-tender. + BS  Ext: No pedal edema. No calf tenderness   Neuro: AAO x 3. No focal deficit. No speech disorder.  Skin: Warm and Dry  Psychiatry: Normal mood and affect    I&O's Detail    10 May 2023 07:01  -  11 May 2023 07:00  --------------------------------------------------------  IN:    Oral Fluid: 180 mL  Total IN: 180 mL    OUT:    Voided (mL): 1000 mL  Total OUT: 1000 mL    Total NET: -820 mL          CARDIAC MARKERS ( 10 May 2023 06:39 )  x     / <0.01 ng/mL / 47 U/L / x     / x      CARDIAC MARKERS ( 09 May 2023 12:50 )  x     / <0.01 ng/mL / x     / x     / x                                13.5   6.00  )-----------( 132      ( 10 May 2023 06:39 )             40.2     11 May 2023 04:57    140    |  103    |  32.5   ----------------------------<  100    3.6     |  23.0   |  1.03     Ca    8.8        11 May 2023 04:57  Mg     2.0       11 May 2023 04:57    TPro  6.5    /  Alb  4.2    /  TBili  1.1    /  DBili  x      /  AST  26     /  ALT  41     /  AlkPhos  90     09 May 2023 12:50      CAPILLARY BLOOD GLUCOSE        LIVER FUNCTIONS - ( 09 May 2023 12:50 )  Alb: 4.2 g/dL / Pro: 6.5 g/dL / ALK PHOS: 90 U/L / ALT: 41 U/L / AST: 26 U/L / GGT: x               MEDICATIONS  (STANDING):  apixaban 2.5 milliGRAM(s) Oral two times a day  atorvastatin 20 milliGRAM(s) Oral at bedtime  chlorhexidine 2% Cloths 1 Application(s) Topical <User Schedule>  diltiazem    milliGRAM(s) Oral daily  furosemide    Tablet 40 milliGRAM(s) Oral daily  metoprolol succinate ER 50 milliGRAM(s) Oral two times a day    MEDICATIONS  (PRN):  acetaminophen     Tablet .. 650 milliGRAM(s) Oral every 6 hours PRN Temp greater or equal to 38C (100.4F), Mild Pain (1 - 3), Moderate Pain (4 - 6)  albuterol/ipratropium for Nebulization 3 milliLiter(s) Nebulizer every 6 hours PRN Shortness of Breath and/or Wheezing  metoprolol tartrate Injectable 5 milliGRAM(s) IV Push every 6 hours PRN If HR persistently > 120  ondansetron Injectable 4 milliGRAM(s) IV Push every 6 hours PRN Nausea and/or Vomiting      RADIOLOGY & ADDITIONAL TESTS:   CC: Shortness of breath     INTERVAL HPI/OVERNIGHT EVENTS: Patient seen and examined. C/O SOB this am with HR's in 140's. Received IV Lopressor x 1 with improved rate control and resolution of . Sitting up in chair. Denies chest pain, dizziness, lightheadedness, nausea, vomiting, fever, chills. Reports having recent viral infection. No cough or sputum.     Vital Signs Last 24 Hrs  T(C): 36.5 (11 May 2023 12:17), Max: 36.7 (10 May 2023 15:41)  T(F): 97.7 (11 May 2023 12:17), Max: 98.1 (10 May 2023 15:41)  HR: 74 (11 May 2023 12:17) (71 - 125)  BP: 96/68 (11 May 2023 12:) (96/68 - 132/85)  BP(mean): --  RR: 17 (11 May 2023 12:17) (17 - 20)  SpO2: 99% (11 May 2023 12:17) (93% - 99%)    Parameters below as of 11 May 2023 12:17  Patient On (Oxygen Delivery Method): nasal cannula  O2 Flow (L/min): 3      PE:  General:  No acute distress, sitting in chair.  HEENT: EOMI. Clear conjunctivae. Moist mucus membrane  Neck: Supple.   Chest: Good air entry. Bibasilar rales. No wheezing or rhonchi.   Heart: S1 & S2 with irregular rhythm.   Abdomen: Obese. Soft. Non-tender. + BS  Ext: No pedal edema. No calf tenderness   Neuro: AAO x 3. No focal deficit. No speech disorder.  Skin: Warm and Dry, no rashes  Psychiatry: Normal mood and affect    I&O's Detail    10 May 2023 07:01  -  11 May 2023 07:00  --------------------------------------------------------  IN:    Oral Fluid: 180 mL  Total IN: 180 mL    OUT:    Voided (mL): 1000 mL  Total OUT: 1000 mL    Total NET: -820 mL          CARDIAC MARKERS ( 10 May 2023 06:39 )  x     / <0.01 ng/mL / 47 U/L / x     / x      CARDIAC MARKERS ( 09 May 2023 12:50 )  x     / <0.01 ng/mL / x     / x     / x                                13.5   6.00  )-----------( 132      ( 10 May 2023 06:39 )             40.2     11 May 2023 04:57    140    |  103    |  32.5   ----------------------------<  100    3.6     |  23.0   |  1.03     Ca    8.8        11 May 2023 04:57  Mg     2.0       11 May 2023 04:57    TPro  6.5    /  Alb  4.2    /  TBili  1.1    /  DBili  x      /  AST  26     /  ALT  41     /  AlkPhos  90     09 May 2023 12:50      CAPILLARY BLOOD GLUCOSE        LIVER FUNCTIONS - ( 09 May 2023 12:50 )  Alb: 4.2 g/dL / Pro: 6.5 g/dL / ALK PHOS: 90 U/L / ALT: 41 U/L / AST: 26 U/L / GGT: x               MEDICATIONS  (STANDING):  apixaban 2.5 milliGRAM(s) Oral two times a day  atorvastatin 20 milliGRAM(s) Oral at bedtime  chlorhexidine 2% Cloths 1 Application(s) Topical <User Schedule>  diltiazem    milliGRAM(s) Oral daily  furosemide    Tablet 40 milliGRAM(s) Oral daily  metoprolol succinate ER 50 milliGRAM(s) Oral two times a day    MEDICATIONS  (PRN):  acetaminophen     Tablet .. 650 milliGRAM(s) Oral every 6 hours PRN Temp greater or equal to 38C (100.4F), Mild Pain (1 - 3), Moderate Pain (4 - 6)  albuterol/ipratropium for Nebulization 3 milliLiter(s) Nebulizer every 6 hours PRN Shortness of Breath and/or Wheezing  metoprolol tartrate Injectable 5 milliGRAM(s) IV Push every 6 hours PRN If HR persistently > 120  ondansetron Injectable 4 milliGRAM(s) IV Push every 6 hours PRN Nausea and/or Vomiting      RADIOLOGY & ADDITIONAL TESTS:

## 2023-05-11 NOTE — PROGRESS NOTE ADULT - SUBJECTIVE AND OBJECTIVE BOX
INTERVAL HISTORY:  Denies any chest pain  Breathing comfortably  heart rate still elevated  	  MEDICATIONS:  diltiazem    milliGRAM(s) Oral daily  furosemide    Tablet 40 milliGRAM(s) Oral daily  metoprolol succinate ER 50 milliGRAM(s) Oral two times a day  metoprolol tartrate Injectable 5 milliGRAM(s) IV Push every 6 hours PRN      albuterol/ipratropium for Nebulization 3 milliLiter(s) Nebulizer every 6 hours PRN    acetaminophen     Tablet .. 650 milliGRAM(s) Oral every 6 hours PRN  ondansetron Injectable 4 milliGRAM(s) IV Push every 6 hours PRN      atorvastatin 20 milliGRAM(s) Oral at bedtime    apixaban 2.5 milliGRAM(s) Oral two times a day  chlorhexidine 2% Cloths 1 Application(s) Topical <User Schedule>        PHYSICAL EXAM:    T(C): 37.4 (23 @ 16:25), Max: 37.4 (23 @ 16:25)  HR: 140 (23 @ 18:57) (71 - 142)  BP: 113/73 (23 @ 18:57) (96/68 - 132/85)  RR: 18 (23 @ 16:25) (17 - 20)  SpO2: 98% (23 @ 16:25) (93% - 99%)  Wt(kg): --    I&O's Summary    10 May 2023 07:  -  11 May 2023 07:00  --------------------------------------------------------  IN: 180 mL / OUT: 1000 mL / NET: -820 mL    11 May 2023 07:  -  11 May 2023 21:23  --------------------------------------------------------  IN: 354 mL / OUT: 0 mL / NET: 354 mL        Daily     Daily Weight in k.5 (11 May 2023 05:02)    Appearance: Normal	  HEENT:   Normal oral mucosa, PERRL, EOMI	  Cardiovascular: Normal S1 S2, No JVD, No murmurs, No edema  Respiratory: Right rales   Psychiatry: A & O x 3, Mood & affect appropriate  Gastrointestinal:  Soft, Non-tender, + BS	  Skin: No rashes, No ecchymoses, No cyanosis  Neurologic: Non-focal  Extremities: Normal range of motion, No clubbing, cyanosis or edema  Vascular: Peripheral pulses palpable 2+ bilaterally      TELEMETRY: 	  Afib 110-120  sometimes up to 140                            13.5   6.00  )-----------( 132      ( 10 May 2023 06:39 )             40.2     05-11    140  |  103  |  32.5<H>  ----------------------------<  100<H>  3.6   |  23.0  |  1.03    Ca    8.8      11 May 2023 04:57  Mg     2.0           proBNP:   Lipid Profile:   HgA1c:     ASSESSMENT/PLAN: 	    89 yo female with hx of A-fib on Eliquis, HTN, and HLD who presents to the ED today for SOB. Patient had a viral infection about 2 weeks ago and has since felt SOB. Went to PMD for check up today and found to be in rapid afib and sent to ED    CT chest shows B/L pleural effusions, negative for PE.  Echo shows normal LV function with mild valvular heart disease    Metoprolol increased to BID  Also on Cardizem  BP soft  Will write for PRN Cardizem  Will give an extra dose of IV Lasix

## 2023-05-12 LAB
ANION GAP SERPL CALC-SCNC: 14 MMOL/L — SIGNIFICANT CHANGE UP (ref 5–17)
BUN SERPL-MCNC: 36.6 MG/DL — HIGH (ref 8–20)
CALCIUM SERPL-MCNC: 8.7 MG/DL — SIGNIFICANT CHANGE UP (ref 8.4–10.5)
CHLORIDE SERPL-SCNC: 102 MMOL/L — SIGNIFICANT CHANGE UP (ref 96–108)
CO2 SERPL-SCNC: 24 MMOL/L — SIGNIFICANT CHANGE UP (ref 22–29)
CREAT SERPL-MCNC: 0.87 MG/DL — SIGNIFICANT CHANGE UP (ref 0.5–1.3)
EGFR: 63 ML/MIN/1.73M2 — SIGNIFICANT CHANGE UP
GLUCOSE SERPL-MCNC: 96 MG/DL — SIGNIFICANT CHANGE UP (ref 70–99)
HCT VFR BLD CALC: 37.6 % — SIGNIFICANT CHANGE UP (ref 34.5–45)
HGB BLD-MCNC: 12.9 G/DL — SIGNIFICANT CHANGE UP (ref 11.5–15.5)
MAGNESIUM SERPL-MCNC: 1.8 MG/DL — SIGNIFICANT CHANGE UP (ref 1.6–2.6)
MCHC RBC-ENTMCNC: 28.8 PG — SIGNIFICANT CHANGE UP (ref 27–34)
MCHC RBC-ENTMCNC: 34.3 GM/DL — SIGNIFICANT CHANGE UP (ref 32–36)
MCV RBC AUTO: 83.9 FL — SIGNIFICANT CHANGE UP (ref 80–100)
PHOSPHATE SERPL-MCNC: 3.9 MG/DL — SIGNIFICANT CHANGE UP (ref 2.4–4.7)
PLATELET # BLD AUTO: 107 K/UL — LOW (ref 150–400)
POTASSIUM SERPL-MCNC: 3.5 MMOL/L — SIGNIFICANT CHANGE UP (ref 3.5–5.3)
POTASSIUM SERPL-SCNC: 3.5 MMOL/L — SIGNIFICANT CHANGE UP (ref 3.5–5.3)
RBC # BLD: 4.48 M/UL — SIGNIFICANT CHANGE UP (ref 3.8–5.2)
RBC # FLD: 14.7 % — HIGH (ref 10.3–14.5)
SODIUM SERPL-SCNC: 140 MMOL/L — SIGNIFICANT CHANGE UP (ref 135–145)
WBC # BLD: 8.38 K/UL — SIGNIFICANT CHANGE UP (ref 3.8–10.5)
WBC # FLD AUTO: 8.38 K/UL — SIGNIFICANT CHANGE UP (ref 3.8–10.5)

## 2023-05-12 PROCEDURE — 99233 SBSQ HOSP IP/OBS HIGH 50: CPT

## 2023-05-12 RX ORDER — POTASSIUM CHLORIDE 20 MEQ
20 PACKET (EA) ORAL ONCE
Refills: 0 | Status: COMPLETED | OUTPATIENT
Start: 2023-05-12 | End: 2023-05-12

## 2023-05-12 RX ORDER — DIGOXIN 250 MCG
500 TABLET ORAL ONCE
Refills: 0 | Status: COMPLETED | OUTPATIENT
Start: 2023-05-12 | End: 2023-05-12

## 2023-05-12 RX ADMIN — CHLORHEXIDINE GLUCONATE 1 APPLICATION(S): 213 SOLUTION TOPICAL at 06:08

## 2023-05-12 RX ADMIN — Medication 20 MILLIEQUIVALENT(S): at 10:34

## 2023-05-12 RX ADMIN — ATORVASTATIN CALCIUM 20 MILLIGRAM(S): 80 TABLET, FILM COATED ORAL at 21:28

## 2023-05-12 RX ADMIN — Medication 40 MILLIGRAM(S): at 06:10

## 2023-05-12 RX ADMIN — Medication 180 MILLIGRAM(S): at 06:09

## 2023-05-12 RX ADMIN — Medication 50 MILLIGRAM(S): at 06:10

## 2023-05-12 RX ADMIN — APIXABAN 2.5 MILLIGRAM(S): 2.5 TABLET, FILM COATED ORAL at 06:08

## 2023-05-12 RX ADMIN — Medication 20 MILLIGRAM(S): at 10:34

## 2023-05-12 RX ADMIN — Medication 500 MICROGRAM(S): at 18:29

## 2023-05-12 RX ADMIN — APIXABAN 2.5 MILLIGRAM(S): 2.5 TABLET, FILM COATED ORAL at 17:56

## 2023-05-12 NOTE — PROVIDER CONTACT NOTE (OTHER) - SITUATION
patient alert x4, NAD sitting up in bed eating dinner. heart rate between 124-135 Blood pressure 102/68

## 2023-05-12 NOTE — PHYSICAL THERAPY INITIAL EVALUATION ADULT - ADDITIONAL COMMENTS
Pt reports she lives at the [facility] living. Independent for ADL's, except for showers. Pt ambulates independently with rollator, at baseline.

## 2023-05-12 NOTE — PHYSICAL THERAPY INITIAL EVALUATION ADULT - GAIT DISTANCE, PT EVAL
ambulation distance limited due pt tachycardic up to 140's.  Returned to sit in bedside chair. RN and NP aware/25 feet

## 2023-05-12 NOTE — PROGRESS NOTE ADULT - SUBJECTIVE AND OBJECTIVE BOX
[facility] Division of Hospital Medicine    Chief Complaint:  Shortness of breath    SUBJECTIVE / OVERNIGHT EVENTS: Patient seen and examined, still with shortness of breath on exertion. Patient becomes tachycardic to 140s when she ambulates, she feels palpitations    Patient denies chest pain,, abd pain, N/V, fever, chills, dysuria or any other complaints. All remainder ROS negative.     MEDICATIONS  (STANDING):  apixaban 2.5 milliGRAM(s) Oral two times a day  atorvastatin 20 milliGRAM(s) Oral at bedtime  chlorhexidine 2% Cloths 1 Application(s) Topical <User Schedule>  diltiazem    milliGRAM(s) Oral daily  furosemide    Tablet 40 milliGRAM(s) Oral daily  metoprolol succinate ER 50 milliGRAM(s) Oral two times a day    MEDICATIONS  (PRN):  acetaminophen     Tablet .. 650 milliGRAM(s) Oral every 6 hours PRN Temp greater or equal to 38C (100.4F), Mild Pain (1 - 3), Moderate Pain (4 - 6)  albuterol/ipratropium for Nebulization 3 milliLiter(s) Nebulizer every 6 hours PRN Shortness of Breath and/or Wheezing  diltiazem Injectable 10 milliGRAM(s) IV Push every 4 hours PRN Heart rate sustained >120  ondansetron Injectable 4 milliGRAM(s) IV Push every 6 hours PRN Nausea and/or Vomiting        I&O's Summary    11 May 2023 07:01  -  12 May 2023 07:00  --------------------------------------------------------  IN: 474 mL / OUT: 0 mL / NET: 474 mL    12 May 2023 07:01  -  12 May 2023 12:19  --------------------------------------------------------  IN: 0 mL / OUT: 600 mL / NET: -600 mL        PHYSICAL EXAM:  Vital Signs Last 24 Hrs  T(C): 37.2 (12 May 2023 07:25), Max: 37.4 (11 May 2023 16:25)  T(F): 99 (12 May 2023 07:25), Max: 99.3 (11 May 2023 16:25)  HR: 110 (12 May 2023 10:30) (64 - 142)  BP: 107/63 (12 May 2023 10:30) (97/71 - 128/76)  BP(mean): 78 (12 May 2023 10:30) (78 - 78)  RR: 16 (12 May 2023 07:25) (16 - 18)  SpO2: 96% (12 May 2023 10:30) (91% - 99%)    Parameters below as of 12 May 2023 10:30  Patient On (Oxygen Delivery Method): nasal cannula  O2 Flow (L/min): 2          CONSTITUTIONAL: NAD,   ENMT: Moist oral mucosa, no pharyngeal injection or exudates;  RESPIRATORY: Normal respiratory effort; lungs are clear to auscultation bilaterally  CARDIOVASCULAR: irregular rhythm, tachycardic,  normal S1 and S2,; trace lower extremity edema;  ABDOMEN: Nontender to palpation, normoactive bowel sounds, no rebound/guarding;   MUSCLOSKELETAL:   no joint swelling or tenderness to palpation  PSYCH: A+O to person, place, and time; affect appropriate  NEUROLOGY: CN 2-12 are intact and symmetric; no gross sensory deficits;   SKIN: No rashes; no palpable lesions    LABS:                        12.9   8.38  )-----------( 107      ( 12 May 2023 06:24 )             37.6     05-12    140  |  102  |  36.6<H>  ----------------------------<  96  3.5   |  24.0  |  0.87    Ca    8.7      12 May 2023 06:24  Phos  3.9     05-12  Mg     1.8     05-12                CAPILLARY BLOOD GLUCOSE            RADIOLOGY & ADDITIONAL TESTS:  Results Reviewed:   Imaging Personally Reviewed:  Electrocardiogram Personally Reviewed:

## 2023-05-12 NOTE — PROGRESS NOTE ADULT - SUBJECTIVE AND OBJECTIVE BOX
INTERVAL HISTORY:  Was tachycardic all day  Given Dig IV earlier and now heart rate and BP much better  Denies any chest pain  Breathing improved  	  MEDICATIONS:  diltiazem    milliGRAM(s) Oral daily  diltiazem Injectable 10 milliGRAM(s) IV Push every 4 hours PRN  furosemide    Tablet 40 milliGRAM(s) Oral daily  metoprolol succinate ER 50 milliGRAM(s) Oral two times a day      albuterol/ipratropium for Nebulization 3 milliLiter(s) Nebulizer every 6 hours PRN    acetaminophen     Tablet .. 650 milliGRAM(s) Oral every 6 hours PRN  ondansetron Injectable 4 milliGRAM(s) IV Push every 6 hours PRN      atorvastatin 20 milliGRAM(s) Oral at bedtime    apixaban 2.5 milliGRAM(s) Oral two times a day  chlorhexidine 2% Cloths 1 Application(s) Topical <User Schedule>        PHYSICAL EXAM:    T(C): 36.1 (23 @ 15:30), Max: 37.2 (23 @ 07:25)  HR: 110 (23 @ 18:24) (64 - 130)  BP: 109/66 (23 @ 18:24) (102/73 - 128/76)  RR: 18 (23 @ 15:30) (16 - 18)  SpO2: 97% (23 @ 15:30) (91% - 99%)  Wt(kg): --    I&O's Summary    11 May 2023 07  -  12 May 2023 07:00  --------------------------------------------------------  IN: 474 mL / OUT: 0 mL / NET: 474 mL    12 May 2023 07:01  -  12 May 2023 20:09  --------------------------------------------------------  IN: 360 mL / OUT: 1000 mL / NET: -640 mL        Daily     Daily Weight in k.3 (12 May 2023 04:21)    Appearance: Normal	  HEENT:   Normal oral mucosa, PERRL, EOMI	  Cardiovascular: Normal S1 S2, No JVD, No murmurs, No edema  Respiratory: Minimal rales right base  Psychiatry: A & O x 3, Mood & affect appropriate  Gastrointestinal:  Soft, Non-tender, + BS	  Skin: No rashes, No ecchymoses, No cyanosis  Neurologic: Non-focal  Extremities: Normal range of motion, No clubbing, cyanosis or edema  Vascular: Peripheral pulses palpable 2+ bilaterally      TELEMETRY: 	  Marjorie 70's                          12.9   8.38  )-----------( 107      ( 12 May 2023 06:24 )             37.6     05-12    140  |  102  |  36.6<H>  ----------------------------<  96  3.5   |  24.0  |  0.87    Ca    8.7      12 May 2023 06:24  Phos  3.9     05-12  Mg     1.8     05-12      proBNP:   Lipid Profile:   HgA1c:     ASSESSMENT/PLAN: 	    89 yo female with hx of A-fib on Eliquis, HTN, and HLD who presents to the ED today for SOB. Patient had a viral infection about 2 weeks ago and has since felt SOB. Went to PMD for check up today and found to be in rapid afib and sent to ED    CT chest shows B/L pleural effusions, negative for PE.  Echo shows normal LV function with mild valvular heart disease    Metoprolol increased to BID  Also on Cardizem  IV Dig seems to have lowered heart rate  Reluctant to give more Dig as she would be on three agents  Continue to monitor

## 2023-05-13 LAB
ANION GAP SERPL CALC-SCNC: 15 MMOL/L — SIGNIFICANT CHANGE UP (ref 5–17)
BUN SERPL-MCNC: 39.8 MG/DL — HIGH (ref 8–20)
CALCIUM SERPL-MCNC: 9 MG/DL — SIGNIFICANT CHANGE UP (ref 8.4–10.5)
CHLORIDE SERPL-SCNC: 104 MMOL/L — SIGNIFICANT CHANGE UP (ref 96–108)
CO2 SERPL-SCNC: 24 MMOL/L — SIGNIFICANT CHANGE UP (ref 22–29)
CREAT SERPL-MCNC: 0.9 MG/DL — SIGNIFICANT CHANGE UP (ref 0.5–1.3)
EGFR: 60 ML/MIN/1.73M2 — SIGNIFICANT CHANGE UP
GLUCOSE SERPL-MCNC: 95 MG/DL — SIGNIFICANT CHANGE UP (ref 70–99)
HCT VFR BLD CALC: 41 % — SIGNIFICANT CHANGE UP (ref 34.5–45)
HGB BLD-MCNC: 13.8 G/DL — SIGNIFICANT CHANGE UP (ref 11.5–15.5)
MAGNESIUM SERPL-MCNC: 2 MG/DL — SIGNIFICANT CHANGE UP (ref 1.6–2.6)
MCHC RBC-ENTMCNC: 28.9 PG — SIGNIFICANT CHANGE UP (ref 27–34)
MCHC RBC-ENTMCNC: 33.7 GM/DL — SIGNIFICANT CHANGE UP (ref 32–36)
MCV RBC AUTO: 85.8 FL — SIGNIFICANT CHANGE UP (ref 80–100)
PLATELET # BLD AUTO: 113 K/UL — LOW (ref 150–400)
POTASSIUM SERPL-MCNC: 3.4 MMOL/L — LOW (ref 3.5–5.3)
POTASSIUM SERPL-SCNC: 3.4 MMOL/L — LOW (ref 3.5–5.3)
RBC # BLD: 4.78 M/UL — SIGNIFICANT CHANGE UP (ref 3.8–5.2)
RBC # FLD: 14.7 % — HIGH (ref 10.3–14.5)
SODIUM SERPL-SCNC: 143 MMOL/L — SIGNIFICANT CHANGE UP (ref 135–145)
WBC # BLD: 6.37 K/UL — SIGNIFICANT CHANGE UP (ref 3.8–10.5)
WBC # FLD AUTO: 6.37 K/UL — SIGNIFICANT CHANGE UP (ref 3.8–10.5)

## 2023-05-13 PROCEDURE — 99232 SBSQ HOSP IP/OBS MODERATE 35: CPT

## 2023-05-13 RX ORDER — POTASSIUM CHLORIDE 20 MEQ
40 PACKET (EA) ORAL ONCE
Refills: 0 | Status: DISCONTINUED | OUTPATIENT
Start: 2023-05-13 | End: 2023-05-13

## 2023-05-13 RX ORDER — FUROSEMIDE 40 MG
1 TABLET ORAL
Qty: 0 | Refills: 0 | DISCHARGE
Start: 2023-05-13

## 2023-05-13 RX ORDER — METOPROLOL TARTRATE 50 MG
1 TABLET ORAL
Refills: 0 | DISCHARGE

## 2023-05-13 RX ORDER — FUROSEMIDE 40 MG
20 TABLET ORAL DAILY
Refills: 0 | Status: DISCONTINUED | OUTPATIENT
Start: 2023-05-13 | End: 2023-05-16

## 2023-05-13 RX ORDER — METOPROLOL TARTRATE 50 MG
1 TABLET ORAL
Qty: 0 | Refills: 0 | DISCHARGE
Start: 2023-05-13

## 2023-05-13 RX ORDER — POTASSIUM CHLORIDE 20 MEQ
1 PACKET (EA) ORAL
Qty: 30 | Refills: 0
Start: 2023-05-13 | End: 2023-06-11

## 2023-05-13 RX ORDER — DILTIAZEM HCL 120 MG
240 CAPSULE, EXT RELEASE 24 HR ORAL DAILY
Refills: 0 | Status: DISCONTINUED | OUTPATIENT
Start: 2023-05-13 | End: 2023-05-15

## 2023-05-13 RX ORDER — POTASSIUM CHLORIDE 20 MEQ
40 PACKET (EA) ORAL ONCE
Refills: 0 | Status: COMPLETED | OUTPATIENT
Start: 2023-05-13 | End: 2023-05-13

## 2023-05-13 RX ADMIN — Medication 50 MILLIGRAM(S): at 05:05

## 2023-05-13 RX ADMIN — Medication 180 MILLIGRAM(S): at 05:04

## 2023-05-13 RX ADMIN — APIXABAN 2.5 MILLIGRAM(S): 2.5 TABLET, FILM COATED ORAL at 05:05

## 2023-05-13 RX ADMIN — Medication 40 MILLIGRAM(S): at 05:04

## 2023-05-13 RX ADMIN — CHLORHEXIDINE GLUCONATE 1 APPLICATION(S): 213 SOLUTION TOPICAL at 05:04

## 2023-05-13 RX ADMIN — Medication 50 MILLIGRAM(S): at 17:54

## 2023-05-13 RX ADMIN — APIXABAN 2.5 MILLIGRAM(S): 2.5 TABLET, FILM COATED ORAL at 17:54

## 2023-05-13 RX ADMIN — Medication 40 MILLIEQUIVALENT(S): at 11:52

## 2023-05-13 RX ADMIN — ATORVASTATIN CALCIUM 20 MILLIGRAM(S): 80 TABLET, FILM COATED ORAL at 21:54

## 2023-05-13 NOTE — PROGRESS NOTE ADULT - SUBJECTIVE AND OBJECTIVE BOX
Patient is a 89y old  Female who presents with a chief complaint of Shortness of breath (13 May 2023 08:48)        PAST MEDICAL & SURGICAL HISTORY:  Mild HTN      Chronic atrial fibrillation      History of left mastectomy          Summary of admission HPI:        PREVIOUS DIAGNOSTIC TESTING:    ECHO:    STRESS:    CATHETERIZATION:    ELECTROPHYSIOLOGY STUDY:    CAROTID ULTRASOUND:    VENOUS DUPLEX SCAN:    CHEST CT PULMONARY ANGIO with IV Contrast:    CXR:    other Radiology studies:      MEDICATIONS  (STANDING):  apixaban 2.5 milliGRAM(s) Oral two times a day  atorvastatin 20 milliGRAM(s) Oral at bedtime  chlorhexidine 2% Cloths 1 Application(s) Topical <User Schedule>  diltiazem    milliGRAM(s) Oral daily  furosemide    Tablet 40 milliGRAM(s) Oral daily  metoprolol succinate ER 50 milliGRAM(s) Oral two times a day    MEDICATIONS  (PRN):  acetaminophen     Tablet .. 650 milliGRAM(s) Oral every 6 hours PRN Temp greater or equal to 38C (100.4F), Mild Pain (1 - 3), Moderate Pain (4 - 6)  albuterol/ipratropium for Nebulization 3 milliLiter(s) Nebulizer every 6 hours PRN Shortness of Breath and/or Wheezing  diltiazem Injectable 10 milliGRAM(s) IV Push every 4 hours PRN Heart rate sustained >120  ondansetron Injectable 4 milliGRAM(s) IV Push every 6 hours PRN Nausea and/or Vomiting      FAMILY HISTORY:  Family history of heart disease (Father)        SOCIAL HISTORY:    CIGARETTES:    ALCOHOL:    REVIEW OF SYSTEMS:    CONSTITUTIONAL: No fever, weight loss, chills, shakes, or fatigue  EYES: No eye pain, visual disturbances, or discharge  ENMT:  No difficulty hearing, tinnitus, vertigo; No sinus or throat pain  NECK: No pain or stiffness  BREASTS: No pain, masses, or nipple discharge  RESPIRATORY: No cough, wheezing, hemoptysis, or shortness of breath  CARDIOVASCULAR: No chest pain, dyspnea, palpitations, dizziness, syncope, paroxysmal nocturnal dyspnea, orthopnea, or arm or leg swelling  GASTROINTESTINAL: No abdominal  or epigastric pain, nausea, vomiting, hematemesis, diarrhea, constipation, melena or bright red blood.  GENITOURINARY: No dysuria, nocturia, hematuria, or urinary incontinence  NEUROLOGICAL: No headaches, memory loss, slurred speech, limb weakness, loss of strength, numbness, or tremors  SKIN: No itching, burning, rashes, or lesions   LYMPH NODES: No enlarged glands  ENDOCRINE: No heat or cold intolerance, or hair loss  MUSCULOSKELETAL: No joint pain or swelling, muscle, back, or extremity pain  PSYCHIATRIC: No depression, anxiety, or difficulty sleeping  HEME/LYMPH: No easy bruising or bleeding gums  ALLERY AND IMMUNOLOGIC: No hives or rash.      Vital Signs Last 24 Hrs  T(C): 36.7 (13 May 2023 07:58), Max: 36.7 (13 May 2023 07:58)  T(F): 98.1 (13 May 2023 07:58), Max: 98.1 (13 May 2023 07:58)  HR: 74 (13 May 2023 07:58) (74 - 130)  BP: 119/74 (13 May 2023 07:58) (102/73 - 136/78)  BP(mean): 83 (12 May 2023 17:07) (83 - 83)  RR: 18 (13 May 2023 07:58) (18 - 18)  SpO2: 95% (13 May 2023 07:58) (94% - 98%)    Parameters below as of 13 May 2023 04:48  Patient On (Oxygen Delivery Method): room air        PHYSICAL EXAM:  Sitting OOB in chair in no respiratory distress.     GENERAL: In no apparent distress, and hydrated.  HEAD:  Atraumatic, Normocephalic  EYES: EOMI, PERRLA, conjunctiva and sclera clear  ENMT: No tonsillar erythema, exudates, or enlargement; Moist mucous membranes, Good dentition, No lesions  NECK: Supple and normal thyroid.  No JVD or carotid bruit.  Carotid pulse is 2+ bilaterally.  HEART: Irregular irregular rate and rhythm; No murmurs, rubs, or gallops.  PULMONARY: Clear to auscultation and perfusion.  No rales, wheezing, or rhonchi bilaterally.  ABDOMEN: Soft, Nontender, Nondistended; Bowel sounds present  EXTREMITIES:  No clubbing, cyanosis, or edema. bilateral varicosities.  LYMPH: No lymphadenopathy noted  NEUROLOGICAL: Grossly nonfocal          INTERPRETATION OF TELEMETRY:    ECG:    I&O's Detail    12 May 2023 07:01  -  13 May 2023 07:00  --------------------------------------------------------  IN:    Oral Fluid: 360 mL  Total IN: 360 mL    OUT:    Voided (mL): 1000 mL  Total OUT: 1000 mL    Total NET: -640 mL      13 May 2023 07:01  -  13 May 2023 15:42  --------------------------------------------------------  IN:  Total IN: 0 mL    OUT:    Voided (mL): 950 mL  Total OUT: 950 mL    Total NET: -950 mL          LABS:                        13.8   6.37  )-----------( 113      ( 13 May 2023 05:40 )             41.0     05-13    143  |  104  |  39.8<H>  ----------------------------<  95  3.4<L>   |  24.0  |  0.90    Ca    9.0      13 May 2023 05:40  Phos  3.9     05-12  Mg     2.0     05-13              BNP  Daily     Daily Weight in k.5 (13 May 2023 06:31)

## 2023-05-13 NOTE — PROGRESS NOTE ADULT - SUBJECTIVE AND OBJECTIVE BOX
BRITANY GALAN    [id number]    89y      Female    INTERVAL HPI/OVERNIGHT EVENTS: patient being seen for afib and sob. patients hr better controlled    REVIEW OF SYSTEMS:    CONSTITUTIONAL: No fever, weight loss, or fatigue  RESPIRATORY: No cough, wheezing, hemoptysis; No shortness of breath  CARDIOVASCULAR: No chest pain, palpitations  GASTROINTESTINAL: No abdominal or epigastric pain. No nausea, vomiting  NEUROLOGICAL: No headaches, memory loss, loss of strength.  MISCELLANEOUS:      Vital Signs Last 24 Hrs  T(C): 36.7 (13 May 2023 07:58), Max: 36.7 (13 May 2023 07:58)  T(F): 98.1 (13 May 2023 07:58), Max: 98.1 (13 May 2023 07:58)  HR: 74 (13 May 2023 07:58) (74 - 130)  BP: 119/74 (13 May 2023 07:58) (102/73 - 136/78)  BP(mean): 83 (12 May 2023 17:07) (78 - 83)  RR: 18 (13 May 2023 07:58) (18 - 18)  SpO2: 95% (13 May 2023 07:58) (94% - 98%)    Parameters below as of 13 May 2023 04:48  Patient On (Oxygen Delivery Method): room air        PHYSICAL EXAM:    CONSTITUTIONAL: NAD,   ENMT: Moist oral mucosa, no pharyngeal injection or exudates;  RESPIRATORY: Normal respiratory effort; lungs are clear to auscultation bilaterally  CARDIOVASCULAR: irregular rhythm, tachycardic,  normal S1 and S2,; trace lower extremity edema;  ABDOMEN: Nontender to palpation, normoactive bowel sounds, no rebound/guarding;   MUSCLOSKELETAL:   no joint swelling or tenderness to palpation  PSYCH: A+O to person, place, and time; affect appropriate  NEUROLOGY: CN 2-12 are intact and symmetric; no gross sensory deficits;   SKIN: No rashes; no palpable lesions      LABS:                        13.8   6.37  )-----------( 113      ( 13 May 2023 05:40 )             41.0     05-13    143  |  104  |  39.8<H>  ----------------------------<  95  3.4<L>   |  24.0  |  0.90    Ca    9.0      13 May 2023 05:40  Phos  3.9     05-12  Mg     2.0     05-13              MEDICATIONS  (STANDING):  apixaban 2.5 milliGRAM(s) Oral two times a day  atorvastatin 20 milliGRAM(s) Oral at bedtime  chlorhexidine 2% Cloths 1 Application(s) Topical <User Schedule>  diltiazem    milliGRAM(s) Oral daily  furosemide    Tablet 40 milliGRAM(s) Oral daily  metoprolol succinate ER 50 milliGRAM(s) Oral two times a day  potassium chloride   Solution 40 milliEquivalent(s) Oral once    MEDICATIONS  (PRN):  acetaminophen     Tablet .. 650 milliGRAM(s) Oral every 6 hours PRN Temp greater or equal to 38C (100.4F), Mild Pain (1 - 3), Moderate Pain (4 - 6)  albuterol/ipratropium for Nebulization 3 milliLiter(s) Nebulizer every 6 hours PRN Shortness of Breath and/or Wheezing  diltiazem Injectable 10 milliGRAM(s) IV Push every 4 hours PRN Heart rate sustained >120  ondansetron Injectable 4 milliGRAM(s) IV Push every 6 hours PRN Nausea and/or Vomiting      RADIOLOGY & ADDITIONAL TESTS:

## 2023-05-14 LAB
ANION GAP SERPL CALC-SCNC: 10 MMOL/L — SIGNIFICANT CHANGE UP (ref 5–17)
BUN SERPL-MCNC: 38.8 MG/DL — HIGH (ref 8–20)
CALCIUM SERPL-MCNC: 9 MG/DL — SIGNIFICANT CHANGE UP (ref 8.4–10.5)
CHLORIDE SERPL-SCNC: 103 MMOL/L — SIGNIFICANT CHANGE UP (ref 96–108)
CO2 SERPL-SCNC: 26 MMOL/L — SIGNIFICANT CHANGE UP (ref 22–29)
CREAT SERPL-MCNC: 0.85 MG/DL — SIGNIFICANT CHANGE UP (ref 0.5–1.3)
EGFR: 65 ML/MIN/1.73M2 — SIGNIFICANT CHANGE UP
GLUCOSE SERPL-MCNC: 112 MG/DL — HIGH (ref 70–99)
HCT VFR BLD CALC: 42 % — SIGNIFICANT CHANGE UP (ref 34.5–45)
HGB BLD-MCNC: 13.9 G/DL — SIGNIFICANT CHANGE UP (ref 11.5–15.5)
MAGNESIUM SERPL-MCNC: 1.9 MG/DL — SIGNIFICANT CHANGE UP (ref 1.6–2.6)
MCHC RBC-ENTMCNC: 28.5 PG — SIGNIFICANT CHANGE UP (ref 27–34)
MCHC RBC-ENTMCNC: 33.1 GM/DL — SIGNIFICANT CHANGE UP (ref 32–36)
MCV RBC AUTO: 86.1 FL — SIGNIFICANT CHANGE UP (ref 80–100)
NT-PROBNP SERPL-SCNC: 906 PG/ML — HIGH (ref 0–300)
PHOSPHATE SERPL-MCNC: 3.1 MG/DL — SIGNIFICANT CHANGE UP (ref 2.4–4.7)
PLATELET # BLD AUTO: 119 K/UL — LOW (ref 150–400)
POTASSIUM SERPL-MCNC: 4.1 MMOL/L — SIGNIFICANT CHANGE UP (ref 3.5–5.3)
POTASSIUM SERPL-SCNC: 4.1 MMOL/L — SIGNIFICANT CHANGE UP (ref 3.5–5.3)
RBC # BLD: 4.88 M/UL — SIGNIFICANT CHANGE UP (ref 3.8–5.2)
RBC # FLD: 14.7 % — HIGH (ref 10.3–14.5)
SODIUM SERPL-SCNC: 139 MMOL/L — SIGNIFICANT CHANGE UP (ref 135–145)
WBC # BLD: 5.12 K/UL — SIGNIFICANT CHANGE UP (ref 3.8–10.5)
WBC # FLD AUTO: 5.12 K/UL — SIGNIFICANT CHANGE UP (ref 3.8–10.5)

## 2023-05-14 PROCEDURE — 93010 ELECTROCARDIOGRAM REPORT: CPT

## 2023-05-14 PROCEDURE — 99232 SBSQ HOSP IP/OBS MODERATE 35: CPT

## 2023-05-14 RX ORDER — POTASSIUM CHLORIDE 20 MEQ
40 PACKET (EA) ORAL ONCE
Refills: 0 | Status: COMPLETED | OUTPATIENT
Start: 2023-05-14 | End: 2023-05-14

## 2023-05-14 RX ADMIN — Medication 20 MILLIGRAM(S): at 05:37

## 2023-05-14 RX ADMIN — Medication 50 MILLIGRAM(S): at 17:11

## 2023-05-14 RX ADMIN — Medication 240 MILLIGRAM(S): at 05:38

## 2023-05-14 RX ADMIN — APIXABAN 2.5 MILLIGRAM(S): 2.5 TABLET, FILM COATED ORAL at 17:10

## 2023-05-14 RX ADMIN — ATORVASTATIN CALCIUM 20 MILLIGRAM(S): 80 TABLET, FILM COATED ORAL at 21:42

## 2023-05-14 RX ADMIN — Medication 50 MILLIGRAM(S): at 05:37

## 2023-05-14 RX ADMIN — CHLORHEXIDINE GLUCONATE 1 APPLICATION(S): 213 SOLUTION TOPICAL at 05:41

## 2023-05-14 RX ADMIN — Medication 40 MILLIEQUIVALENT(S): at 08:23

## 2023-05-14 RX ADMIN — APIXABAN 2.5 MILLIGRAM(S): 2.5 TABLET, FILM COATED ORAL at 05:38

## 2023-05-14 NOTE — PROGRESS NOTE ADULT - NS ATTEST RISK PROBLEM GEN_ALL_CORE FT
persistant atrial flutter and rapid ventricular response, diastolic CHF, pleural effusions.
Persistant atrial flutter with now controlled VR, resolving CHF.

## 2023-05-14 NOTE — PROGRESS NOTE ADULT - SUBJECTIVE AND OBJECTIVE BOX
BRITANY GALAN    [id number]    89y      Female    INTERVAL HPI/OVERNIGHT EVENTS: patient being seen for afib    overnight opatient had a 2.5 sec pause. patient denies any complaints    REVIEW OF SYSTEMS:    CONSTITUTIONAL: No fever, weight loss, or fatigue  RESPIRATORY: No cough, wheezing, hemoptysis; No shortness of breath  CARDIOVASCULAR: No chest pain, palpitations  GASTROINTESTINAL: No abdominal or epigastric pain. No nausea, vomiting  NEUROLOGICAL: No headaches, memory loss, loss of strength.  MISCELLANEOUS:      Vital Signs Last 24 Hrs  T(C): 36.5 (14 May 2023 07:57), Max: 36.9 (14 May 2023 05:36)  T(F): 97.7 (14 May 2023 07:57), Max: 98.4 (14 May 2023 05:36)  HR: 73 (14 May 2023 07:57) (72 - 99)  BP: 135/84 (14 May 2023 07:57) (118/77 - 135/84)  BP(mean): --  RR: 18 (14 May 2023 07:57) (18 - 18)  SpO2: 95% (14 May 2023 07:57) (95% - 96%)    Parameters below as of 14 May 2023 05:36  Patient On (Oxygen Delivery Method): room air        PHYSICAL EXAM:      CONSTITUTIONAL: NAD,   ENMT: Moist oral mucosa, no pharyngeal injection or exudates;  RESPIRATORY: Normal respiratory effort; lungs are clear to auscultation bilaterally  CARDIOVASCULAR: irregular rhythm, tachycardic,  normal S1 and S2,; trace lower extremity edema;  ABDOMEN: Nontender to palpation, normoactive bowel sounds, no rebound/guarding;   MUSCLOSKELETAL:   no joint swelling or tenderness to palpation  PSYCH: A+O to person, place, and time; affect appropriate  NEUROLOGY: CN 2-12 are intact and symmetric; no gross sensory deficits;   SKIN: No rashes; no palpable lesions          LABS:                        13.9   5.12  )-----------( 119      ( 14 May 2023 06:57 )             42.0     05-14    139  |  103  |  38.8<H>  ----------------------------<  112<H>  4.1   |  26.0  |  0.85    Ca    9.0      14 May 2023 06:57  Phos  3.1     05-14  Mg     1.9     05-14              MEDICATIONS  (STANDING):  apixaban 2.5 milliGRAM(s) Oral two times a day  atorvastatin 20 milliGRAM(s) Oral at bedtime  chlorhexidine 2% Cloths 1 Application(s) Topical <User Schedule>  diltiazem    milliGRAM(s) Oral daily  furosemide    Tablet 20 milliGRAM(s) Oral daily  metoprolol succinate ER 50 milliGRAM(s) Oral two times a day    MEDICATIONS  (PRN):  acetaminophen     Tablet .. 650 milliGRAM(s) Oral every 6 hours PRN Temp greater or equal to 38C (100.4F), Mild Pain (1 - 3), Moderate Pain (4 - 6)  albuterol/ipratropium for Nebulization 3 milliLiter(s) Nebulizer every 6 hours PRN Shortness of Breath and/or Wheezing  ondansetron Injectable 4 milliGRAM(s) IV Push every 6 hours PRN Nausea and/or Vomiting      RADIOLOGY & ADDITIONAL TESTS:

## 2023-05-14 NOTE — PROGRESS NOTE ADULT - TIME BILLING
Patient and chart reviewed, telemetry reviewed.  Patient examined.
patient examined and interviewed.  chart and telemetry reviewed.

## 2023-05-14 NOTE — PROGRESS NOTE ADULT - SUBJECTIVE AND OBJECTIVE BOX
Patient is a 89y old  Female who presents with a chief complaint of Shortness of breath, atrial flutter (13 May 2023 15:42)        PAST MEDICAL & SURGICAL HISTORY:  Mild HTN      Chronic atrial fibrillation      History of left mastectomy          Summary of admission HPI:        PREVIOUS DIAGNOSTIC TESTING:    ECHO:    STRESS:    CATHETERIZATION:    ELECTROPHYSIOLOGY STUDY:    CAROTID ULTRASOUND:    VENOUS DUPLEX SCAN:    CHEST CT PULMONARY ANGIO with IV Contrast:    CXR:    other Radiology studies:      MEDICATIONS  (STANDING):  apixaban 2.5 milliGRAM(s) Oral two times a day  atorvastatin 20 milliGRAM(s) Oral at bedtime  chlorhexidine 2% Cloths 1 Application(s) Topical <User Schedule>  diltiazem    milliGRAM(s) Oral daily  furosemide    Tablet 20 milliGRAM(s) Oral daily  metoprolol succinate ER 50 milliGRAM(s) Oral two times a day    MEDICATIONS  (PRN):  acetaminophen     Tablet .. 650 milliGRAM(s) Oral every 6 hours PRN Temp greater or equal to 38C (100.4F), Mild Pain (1 - 3), Moderate Pain (4 - 6)  albuterol/ipratropium for Nebulization 3 milliLiter(s) Nebulizer every 6 hours PRN Shortness of Breath and/or Wheezing  ondansetron Injectable 4 milliGRAM(s) IV Push every 6 hours PRN Nausea and/or Vomiting      FAMILY HISTORY:  Family history of heart disease (Father)        Vital Signs Last 24 Hrs  T(C): 36.5 (14 May 2023 07:57), Max: 36.9 (14 May 2023 05:36)  T(F): 97.7 (14 May 2023 07:57), Max: 98.4 (14 May 2023 05:36)  HR: 73 (14 May 2023 07:57) (72 - 99)  BP: 135/84 (14 May 2023 07:57) (118/77 - 135/84)  BP(mean): --  RR: 18 (14 May 2023 07:57) (18 - 18)  SpO2: 95% (14 May 2023 07:57) (95% - 96%)    Parameters below as of 14 May 2023 05:36  Patient On (Oxygen Delivery Method): room air        PHYSICAL EXAM:  Sitting OOB in chair leaning to the right side with white sputum production.    GENERAL: In no apparent distress, and hydrated.  HEAD:  Atraumatic, Normocephalic  EYES: EOMI, PERRLA, conjunctiva and sclera clear  ENMT: No tonsillar erythema, exudates, or enlargement; Moist mucous membranes, Good dentition, No lesions  NECK: Supple and normal thyroid.  No JVD or carotid bruit.  Carotid pulse is 2+ bilaterally.  HEART: Regular rate and rhythm; No murmurs, rubs, or gallops.  PULMONARY: Crackles 1/3 way up on left side and basilar on the right side  EXTREMITIES:   No clubbing, cyanosis, or edema  NEUROLOGICAL: Grossly nonfocal          INTERPRETATION OF TELEMETRY:  atrial flutter with VR 75 bpm.      I&O's Detail    13 May 2023 07:01  -  14 May 2023 07:00  --------------------------------------------------------  IN:  Total IN: 0 mL    OUT:    Voided (mL): 950 mL  Total OUT: 950 mL    Total NET: -950 mL          LABS:                        13.9   5.12  )-----------( 119      ( 14 May 2023 06:57 )             42.0     05-14    139  |  103  |  38.8<H>  ----------------------------<  112<H>  4.1   |  26.0  |  0.85    Ca    9.0      14 May 2023 06:57  Phos  3.1     05-14  Mg     1.9     -14              BNP  Daily     Daily Weight in k.1 (14 May 2023 05:40)

## 2023-05-14 NOTE — PROGRESS NOTE ADULT - NUTRITIONAL ASSESSMENT
1.  Persistant common form of atrial flutter admitted 5/9/2023 with rapid VR of 133 bpm-now controlled VR at 75 bpm  Metoprolol XL increased to BID, Cardizem  mg po qd, and Digoxin.    -IV Dig given 5/12/2023.  -agree that patient is not long term candidate for Digoxin, due to advanced age and need for ongoing diuresis.  -Increase Cardizem  mg po qd.  -Consider DC cardioversion or radiofrequency catheter ablation for common form atrial flutter, if patient's ventricular response continues to be difficult to manage on AV mary alice blocking agents.   -agree with Eliquis 2.5 mg po bid.     2.  CHF with bilateral pleural effusions/atelectasis by chest CT and CXR 5/9/2023-proBNP elevated 4,798 on 5/9/2023 decreased to 906-echo 5/11/2023 LVEF=50 %, mild MR-clinical presentation consistant with acute on chronic diastolic CHF-pulse ox ranging between 91 and 99 % on room air.    -Continue Lasix 20 to 40 mg po qd.  -proBNP.    3.  HTN-SBP 96 to 136 mm Hg.

## 2023-05-15 ENCOUNTER — TRANSCRIPTION ENCOUNTER (OUTPATIENT)
Age: 88
End: 2023-05-15

## 2023-05-15 LAB
ALBUMIN SERPL ELPH-MCNC: 3.8 G/DL — SIGNIFICANT CHANGE UP (ref 3.3–5.2)
ALP SERPL-CCNC: 67 U/L — SIGNIFICANT CHANGE UP (ref 40–120)
ALT FLD-CCNC: 16 U/L — SIGNIFICANT CHANGE UP
ANION GAP SERPL CALC-SCNC: 10 MMOL/L — SIGNIFICANT CHANGE UP (ref 5–17)
ANION GAP SERPL CALC-SCNC: 12 MMOL/L — SIGNIFICANT CHANGE UP (ref 5–17)
AST SERPL-CCNC: 14 U/L — SIGNIFICANT CHANGE UP
BILIRUB SERPL-MCNC: 0.7 MG/DL — SIGNIFICANT CHANGE UP (ref 0.4–2)
BUN SERPL-MCNC: 38.1 MG/DL — HIGH (ref 8–20)
BUN SERPL-MCNC: 43 MG/DL — HIGH (ref 8–20)
CALCIUM SERPL-MCNC: 9 MG/DL — SIGNIFICANT CHANGE UP (ref 8.4–10.5)
CALCIUM SERPL-MCNC: 9.1 MG/DL — SIGNIFICANT CHANGE UP (ref 8.4–10.5)
CHLORIDE SERPL-SCNC: 102 MMOL/L — SIGNIFICANT CHANGE UP (ref 96–108)
CHLORIDE SERPL-SCNC: 105 MMOL/L — SIGNIFICANT CHANGE UP (ref 96–108)
CO2 SERPL-SCNC: 25 MMOL/L — SIGNIFICANT CHANGE UP (ref 22–29)
CO2 SERPL-SCNC: 26 MMOL/L — SIGNIFICANT CHANGE UP (ref 22–29)
CREAT SERPL-MCNC: 0.86 MG/DL — SIGNIFICANT CHANGE UP (ref 0.5–1.3)
CREAT SERPL-MCNC: 0.89 MG/DL — SIGNIFICANT CHANGE UP (ref 0.5–1.3)
EGFR: 61 ML/MIN/1.73M2 — SIGNIFICANT CHANGE UP
EGFR: 64 ML/MIN/1.73M2 — SIGNIFICANT CHANGE UP
GLUCOSE SERPL-MCNC: 100 MG/DL — HIGH (ref 70–99)
GLUCOSE SERPL-MCNC: 107 MG/DL — HIGH (ref 70–99)
HCT VFR BLD CALC: 40 % — SIGNIFICANT CHANGE UP (ref 34.5–45)
HGB BLD-MCNC: 13.7 G/DL — SIGNIFICANT CHANGE UP (ref 11.5–15.5)
MAGNESIUM SERPL-MCNC: 2 MG/DL — SIGNIFICANT CHANGE UP (ref 1.6–2.6)
MAGNESIUM SERPL-MCNC: 2 MG/DL — SIGNIFICANT CHANGE UP (ref 1.6–2.6)
MCHC RBC-ENTMCNC: 28.7 PG — SIGNIFICANT CHANGE UP (ref 27–34)
MCHC RBC-ENTMCNC: 34.3 GM/DL — SIGNIFICANT CHANGE UP (ref 32–36)
MCV RBC AUTO: 83.9 FL — SIGNIFICANT CHANGE UP (ref 80–100)
PHOSPHATE SERPL-MCNC: 3.6 MG/DL — SIGNIFICANT CHANGE UP (ref 2.4–4.7)
PHOSPHATE SERPL-MCNC: 4.4 MG/DL — SIGNIFICANT CHANGE UP (ref 2.4–4.7)
PLATELET # BLD AUTO: 122 K/UL — LOW (ref 150–400)
POTASSIUM SERPL-MCNC: 4.4 MMOL/L — SIGNIFICANT CHANGE UP (ref 3.5–5.3)
POTASSIUM SERPL-MCNC: 4.6 MMOL/L — SIGNIFICANT CHANGE UP (ref 3.5–5.3)
POTASSIUM SERPL-SCNC: 4.4 MMOL/L — SIGNIFICANT CHANGE UP (ref 3.5–5.3)
POTASSIUM SERPL-SCNC: 4.6 MMOL/L — SIGNIFICANT CHANGE UP (ref 3.5–5.3)
PROT SERPL-MCNC: 5.7 G/DL — LOW (ref 6.6–8.7)
RBC # BLD: 4.77 M/UL — SIGNIFICANT CHANGE UP (ref 3.8–5.2)
RBC # FLD: 14.9 % — HIGH (ref 10.3–14.5)
SODIUM SERPL-SCNC: 138 MMOL/L — SIGNIFICANT CHANGE UP (ref 135–145)
SODIUM SERPL-SCNC: 142 MMOL/L — SIGNIFICANT CHANGE UP (ref 135–145)
TROPONIN T SERPL-MCNC: <0.01 NG/ML — SIGNIFICANT CHANGE UP (ref 0–0.06)
WBC # BLD: 5.89 K/UL — SIGNIFICANT CHANGE UP (ref 3.8–10.5)
WBC # FLD AUTO: 5.89 K/UL — SIGNIFICANT CHANGE UP (ref 3.8–10.5)

## 2023-05-15 PROCEDURE — 93010 ELECTROCARDIOGRAM REPORT: CPT

## 2023-05-15 PROCEDURE — 93010 ELECTROCARDIOGRAM REPORT: CPT | Mod: 77

## 2023-05-15 PROCEDURE — 99232 SBSQ HOSP IP/OBS MODERATE 35: CPT

## 2023-05-15 RX ORDER — LISINOPRIL 2.5 MG/1
0.5 TABLET ORAL
Qty: 30 | Refills: 0
Start: 2023-05-15 | End: 2023-06-13

## 2023-05-15 RX ORDER — DILTIAZEM HCL 120 MG
180 CAPSULE, EXT RELEASE 24 HR ORAL DAILY
Refills: 0 | Status: DISCONTINUED | OUTPATIENT
Start: 2023-05-16 | End: 2023-05-16

## 2023-05-15 RX ORDER — FUROSEMIDE 40 MG
0.5 TABLET ORAL
Qty: 15 | Refills: 0
Start: 2023-05-15 | End: 2023-06-13

## 2023-05-15 RX ORDER — DILTIAZEM HCL 120 MG
1 CAPSULE, EXT RELEASE 24 HR ORAL
Refills: 0 | DISCHARGE

## 2023-05-15 RX ORDER — ATROPINE SULFATE 0.1 MG/ML
0.5 SYRINGE (ML) INJECTION ONCE
Refills: 0 | Status: DISCONTINUED | OUTPATIENT
Start: 2023-05-15 | End: 2023-05-16

## 2023-05-15 RX ORDER — DILTIAZEM HCL 120 MG
1 CAPSULE, EXT RELEASE 24 HR ORAL
Qty: 30 | Refills: 0
Start: 2023-05-15 | End: 2023-06-13

## 2023-05-15 RX ORDER — LISINOPRIL 2.5 MG/1
1 TABLET ORAL
Refills: 0 | DISCHARGE

## 2023-05-15 RX ORDER — POTASSIUM CHLORIDE 20 MEQ
1 PACKET (EA) ORAL
Qty: 30 | Refills: 0
Start: 2023-05-15 | End: 2023-06-13

## 2023-05-15 RX ADMIN — Medication 50 MILLIGRAM(S): at 05:19

## 2023-05-15 RX ADMIN — Medication 50 MILLIGRAM(S): at 17:17

## 2023-05-15 RX ADMIN — ATORVASTATIN CALCIUM 20 MILLIGRAM(S): 80 TABLET, FILM COATED ORAL at 21:30

## 2023-05-15 RX ADMIN — Medication 20 MILLIGRAM(S): at 05:19

## 2023-05-15 RX ADMIN — APIXABAN 2.5 MILLIGRAM(S): 2.5 TABLET, FILM COATED ORAL at 05:19

## 2023-05-15 RX ADMIN — APIXABAN 2.5 MILLIGRAM(S): 2.5 TABLET, FILM COATED ORAL at 17:17

## 2023-05-15 RX ADMIN — CHLORHEXIDINE GLUCONATE 1 APPLICATION(S): 213 SOLUTION TOPICAL at 05:19

## 2023-05-15 RX ADMIN — Medication 240 MILLIGRAM(S): at 05:19

## 2023-05-15 NOTE — DISCHARGE NOTE PROVIDER - NSDCFUSCHEDAPPT_GEN_ALL_CORE_FT
KYM BERRY  [facility]  [facility] [address]  Scheduled Appointment: 05/25/2023

## 2023-05-15 NOTE — DISCHARGE NOTE PROVIDER - ATTENDING DISCHARGE PHYSICAL EXAMINATION:
CONSTITUTIONAL: NAD,   ENMT: Moist oral mucosa, no pharyngeal injection or exudates;  RESPIRATORY: Normal respiratory effort; lungs are clear to auscultation bilaterally  CARDIOVASCULAR: irregular rhythm, tachycardic,  normal S1 and S2,; trace lower extremity edema;  ABDOMEN: Nontender to palpation, normoactive bowel sounds, no rebound/guarding;   MUSCLOSKELETAL:   no joint swelling or tenderness to palpation  PSYCH: A+O to person, place, and time; affect appropriate  NEUROLOGY: CN 2-12 are intact and symmetric; no gross sensory deficits;   SKIN: No rashes; no palpable lesions

## 2023-05-15 NOTE — DISCHARGE NOTE PROVIDER - NSDCCPCAREPLAN_GEN_ALL_CORE_FT
PRINCIPAL DISCHARGE DIAGNOSIS  Diagnosis: Acute CHF  Assessment and Plan of Treatment: Continue your Lasix as prescribed  Continue restricting your fluids to 1200 cc a day  Weigh yourself daily and bae your Cardiologist if you gain more then 1-2 lbs in one day or 5 lbs in one week.  Follow up with your Cardiologist      SECONDARY DISCHARGE DIAGNOSES  Diagnosis: Atrial fibrillation and flutter  Assessment and Plan of Treatment: Rate controlled on current RX  Continue your Eliquis for AC  Follow up with your Cardiologist for further management     PRINCIPAL DISCHARGE DIAGNOSIS  Diagnosis: Acute CHF  Assessment and Plan of Treatment: Continue your Lasix as prescribed  Continue restricting your fluids to 1200 cc a day  Weigh yourself daily and bae your Cardiologist if you gain more then 1-2 lbs in one day or 5 lbs in one week.  Follow up with your Cardiologist      SECONDARY DISCHARGE DIAGNOSES  Diagnosis: Atrial fibrillation and flutter  Assessment and Plan of Treatment: Rate controlled on current RX of Cardizem 180 mg daily and Toprol 50 mg BID  Continue your Eliquis for AC  Follow up with your Cardiologist for further management

## 2023-05-15 NOTE — DIETITIAN INITIAL EVALUATION ADULT - NS FNS DIET ORDER
Diet, DASH/TLC:   Sodium & Cholesterol Restricted  1200mL Fluid Restriction ([id number]) (05-09-23 @ 17:31)

## 2023-05-15 NOTE — DIETITIAN INITIAL EVALUATION ADULT - PERTINENT MEDS FT
MEDICATIONS  (STANDING):  apixaban 2.5 milliGRAM(s) Oral two times a day  atorvastatin 20 milliGRAM(s) Oral at bedtime  chlorhexidine 2% Cloths 1 Application(s) Topical <User Schedule>  diltiazem    milliGRAM(s) Oral daily  furosemide    Tablet 20 milliGRAM(s) Oral daily  metoprolol succinate ER 50 milliGRAM(s) Oral two times a day    MEDICATIONS  (PRN):  acetaminophen     Tablet .. 650 milliGRAM(s) Oral every 6 hours PRN Temp greater or equal to 38C (100.4F), Mild Pain (1 - 3), Moderate Pain (4 - 6)  albuterol/ipratropium for Nebulization 3 milliLiter(s) Nebulizer every 6 hours PRN Shortness of Breath and/or Wheezing  ondansetron Injectable 4 milliGRAM(s) IV Push every 6 hours PRN Nausea and/or Vomiting

## 2023-05-15 NOTE — DIETITIAN INITIAL EVALUATION ADULT - ORAL INTAKE PTA/DIET HISTORY
RD assessment completed at bed side, pt states she has a good appetite and ate breakfast. No complaints of N/V/D/C with stable weights ~130lbs. Provided and reviewed DASH diet education. Pt able to make needs known and place meal time orders. RD to remain available

## 2023-05-15 NOTE — PROGRESS NOTE ADULT - ASSESSMENT
89 year old female with history of Chronic A. Fib on Eliquis, HTN, HLD and Breast Cancer s/p Left Mastectomy sent by PMD for evaluation. As per patient, she had Flu 2-3 weeks ago and since then she has been having exertional dyspnea, orthopnea and paroxysmal nocturnal dyspnea. Found to be in aflutter.     Acute Heart Failure preserved EF  - ECHO  EF 50-55%  - Lasix  -  Metoprolol 50 mg BID,  - Hold Lisinopril to allow room for AVN blockers   - Cardiology following     Chronic A. Fib with RVR  - Metoprolol ER 50 mg to BID and cardizem  - Eliquis 2.5 mg BID  - Cardiology, Dr. Jean following  given dig yesterday     HTN  -  Metoprolol and Cardizem   - Lisinopril on hold to allow for increase AVN blocking agents for rate control     HLD  -  Lipitor     hypokalemia - replete     Thrombocytopenia   - Unknown baseline   - monitor    DVT Prophylaxis -- Patient is on Eliquis.    Dispo: [facility] .  unclear if she can go back today or monday   
89 year old female with history of Chronic A. Fib on Eliquis, HTN, HLD and Breast Cancer s/p Left Mastectomy sent by PMD for evaluation. As per patient, she had Flu 2-3 weeks ago and since then she has been having exertional dyspnea, orthopnea and paroxysmal nocturnal dyspnea. Found to be in aflutter. Patient has 2.5 sec pause on 5/14    Acute Heart Failure preserved EF  - ECHO  EF 50-55%  - Lasix  -  Metoprolol 50 mg BID,  - Hold Lisinopril to allow room for AVN blockers   - Cardiology following     Chronic A. Fib with RVR with 2.5 sec pause  - Metoprolol ER 50 mg to BID and cardizem  - Eliquis 2.5 mg BID  - Cardiology, Dr. Jean following    HTN  -  Metoprolol and Cardizem   - Lisinopril on hold to allow for increase AVN blocking agents for rate control     HLD  -  Lipitor     hypokalemia - replete     Thrombocytopenia   - Unknown baseline   - monitor    DVT Prophylaxis -- Patient is on Eliquis.    Dispo: [facility] .  dc once cleared by cardio   
89 yo female with hx of Atrial arrhythmias on Eliquis, HTN, and HLD, admitted 5/9/2023 with 2 weeks of dyspnea and palpitations, since having a viral infection. 12 lead ECG done 5/9/2023 demonstrated atrial flutter with VR of 133 bpm.  Telemetry demonstrates persistant common form atrial fluter.     CT chest shows B/L pleural effusions, negative for PE.  Echo shows normal LV function with mild valvular heart disease      Assessment/Recommendations:     1.  Persistant common form of atrial flutter admitted 5/9/2023 with rapid VR of 133 bpm-now controlled VR at 74 bpm  Metoprolol XL increased to BID, Cardizem  mg po qd, and Digoxin.    -IV Dig given 5/12/2023.  -agree that patient is not long term candidate for Digoxin, due to advanced age and need for ongoing diuresis.  -Increase Cardizem  mg po qd.  -Consider DC cardioversion or radiofrequency catheter ablation for common form atrial flutter, if patient's ventricular response continues to be difficult to manage on AV mary alice blocking agents.   -agree with Eliquis 2.5 mg po bid.     2.  CHF with bilateral pleural effusions/atelectasis by chest CT and CXR 5/9/2023-proBNP elevated 4,798 done 5/9/2023-echo 5/11/2023 LVEF=50 %, mild MR-clinical presentation consistant with acute on chronic diastolic CHF-pulse ox ranging between 91 and 99 % on room air.    -Lasix 20 mg po qd.  -proBNP.    3.  HTN- to 136 mm Hg.   
89 year old female with history of Chronic A. Fib on Eliquis, HTN, HLD and Breast Cancer s/p Left Mastectomy sent by PMD for evaluation. As per patient, she had Flu 2-3 weeks ago and since then she has been having exertional dyspnea, orthopnea and paroxysmal nocturnal dyspnea. Found to be in aflutter.     Acute Heart Failure preserved EF  - Cardiac enzymes negative   - ECHO  EF 50-55%  - Strict intake/output and daily weight  - Lasix was switched to 40 mg PO 5/10, given extra dose of IV today  -  Metoprolol 50 mg BID, Cardiology to uptitrate   - Hold Lisinopril to allow room for AVN blockers   - Cardiology following     Chronic A. Fib with RVR  -  Cardizem  mg daily   - Increased Metoprolol ER 50 mg to BID on 5/10, Cardiology to decide on increasing metoprolol vs Cardizem  - Eliquis 2.5 mg BID  - Cardiology, Dr. Jean following    HTN  -  Metoprolol and Cardizem   - Lisinopril on hold to allow for increase AVN blocking agents for rate control     HLD  -  Lipitor      Thrombocytopenia   - Unknown baseline   - ? secondary to recent viral infection, improving  - monitor    DVT Prophylaxis -- Patient is on Eliquis.    Dispo: [facility] . PT eval pending once HR better controlled  Patient and son updated at bedside.       
89 year old female with history of Chronic A. Fib on Eliquis, HTN, HLD and Breast Cancer s/p Left Mastectomy sent by PMD for evaluation. As per patient, she had Flu 2-3 weeks ago and since then she has been having exertional dyspnea, orthopnea and paroxysmal nocturnal dyspnea. Denies lower extremity swelling. Denies chest pain however she has noticed palpitations at times. Today, she went to her PMD and was found to be in Rapid A. Fib so she was sent to the hospital.   In ER, she was found to be in Acute CHF with Rapid A. Fib with RVR. She was given Cardizem 10 mg IVP x 1, Cardizem 90 mg PO x 1 and Lasix 40 mg x 1.     1) Acute Heart Failure  - Cardiac enzymes negative   - ECHO pending   - Strict intake/output and daily weight  - Lasix was switched to 40 mg PO today   - Continue Metoprolol  - Hold Lisinopril to allow room for AVN blockers   - Cardiology recs appreciated     2) Chronic A. Fib with RVR  - Resumed Cardizem  mg daily   - Increase Metoprolol ER 50 mg to BID   - Resumed Eliquis 2.5 mg BID  - Cardiology Consult appreciated     3) HTN  - Resumed Metoprolol and Cardizem   - Lisinopril on hold     4) HLD  - Resume Lipitor     5) Thrombocytopenia   - Unknown baseline   - ? secondary to recent viral infection  - Monitor     DVT Prophylaxis -- Patient is on Eliquis.    Dispo: [facility] in 24 hours. PT savitaal.    Updated patient's son at bedside.    
89 year old female with history of Chronic A. Fib on Eliquis, HTN, HLD and Breast Cancer s/p Left Mastectomy sent by PMD for evaluation. As per patient, she had Flu 2-3 weeks ago and since then she has been having exertional dyspnea, orthopnea and paroxysmal nocturnal dyspnea. Denies lower extremity swelling. Denies chest pain however she has noticed palpitations at times. Today, she went to her PMD and was found to be in Rapid A. Fib so she was sent to the hospital.  In ER, she was found to be in Acute CHF with Rapid A. Fib with RVR. She was given Cardizem 10 mg IVP x 1, Cardizem 90 mg PO x 1 and Lasix 40 mg x 1. BNP on admission 4798, Troponin negative x 2. CT chest shows B/L pleural effusions, negative for PE. Improved after IV Lasix.     1) Acute Heart Failure  - Cardiac enzymes negative   - ECHO pending   - Strict intake/output and daily weight  - Lasix was switched to 40 mg PO 5/10  -  Metoprolol  - Hold Lisinopril to allow room for AVN blockers   - Cardiology following  -     2) Chronic A. Fib with RVR  - Resumed Cardizem  mg daily   - Increased Metoprolol ER 50 mg to BID on 5/10, required additional Lopressor IV x 1 this am  - Resumed Eliquis 2.5 mg BID  - Cardiology, Dr. Jean following    3) HTN  -  Metoprolol and Cardizem   - Lisinopril on hold to allow for increase AVN blocking agents for rate control    4) HLD  -  Lipitor     5) Thrombocytopenia   - Unknown baseline   - ? secondary to recent viral infection, improving  - monitor    DVT Prophylaxis -- Patient is on Eliquis.    Dispo: [facility] . PT eval.  
89 year old female with history of Chronic A. Fib on Eliquis, HTN, HLD and Breast Cancer s/p Left Mastectomy sent by PMD for evaluation. As per patient, she had Flu 2-3 weeks ago and since then she has been having exertional dyspnea, orthopnea and paroxysmal nocturnal dyspnea. Found to be in aflutter. Patient has 2.5 sec pause on 5/14    Acute Heart Failure preserved EF  - ECHO  EF 50-55%  - Lasix  -  Metoprolol 50 mg BID,  - Cardiology following     Chronic A. Fib with RVR with pauses  - Metoprolol ER 50 mg to BID and cardizem  - Eliquis 2.5 mg BID  - Cardiology, Dr. Jean following    HTN  -  Metoprolol and Cardizem   - Lisinopril on hold to allow for increase AVN blocking agents for rate control     HLD  -  Lipitor      Thrombocytopenia   - Unknown baseline   - monitor    DVT Prophylaxis -- Patient is on Eliquis.    dc to [facility] once cleared by cardio   
1.  Persistant common form of atrial flutter admitted 5/9/2023 with rapid VR of 133 bpm-now controlled VR at 74 bpm  Metoprolol XL 50 to BID and increased dose of  Cardizem  mg po qd.    -IV Dig x 1 given 5/12/2023.    -patient is tolerating Increased dose of  Cardizem  mg po qd.  -Consider DC cardioversion or radiofrequency catheter ablation for common form atrial flutter, if patient's ventricular response continues to be difficult to manage on AV mary alice blocking agents.   -agree with Eliquis 2.5 mg po bid.     2.  CHF with bilateral pleural effusions/atelectasis by chest CT and CXR 5/9/2023-proBNP elevated 4,798 done 5/9/2023 decreased to 906 on 5/14/2023-echo 5/11/2023 LVEF=50 %, mild MR-clinical presentation consistant with acute on chronic diastolic CHF-pulse ox ranging between 91 and 99 % on room air-clinically improving.    -Continue Lasix 20 mg po qd.      3.  HTN- to 136 mm Hg.

## 2023-05-15 NOTE — PROGRESS NOTE ADULT - SUBJECTIVE AND OBJECTIVE BOX
INTERVAL HISTORY:  Feeling Better  Denies chest pain and SOB  BP and HR remain stable  	  MEDICATIONS:  diltiazem    milliGRAM(s) Oral daily  furosemide    Tablet 20 milliGRAM(s) Oral daily  metoprolol succinate ER 50 milliGRAM(s) Oral two times a day  albuterol/ipratropium for Nebulization 3 milliLiter(s) Nebulizer every 6 hours PRN  acetaminophen     Tablet .. 650 milliGRAM(s) Oral every 6 hours PRN  ondansetron Injectable 4 milliGRAM(s) IV Push every 6 hours PRN  atorvastatin 20 milliGRAM(s) Oral at bedtime  apixaban 2.5 milliGRAM(s) Oral two times a day  chlorhexidine 2% Cloths 1 Application(s) Topical <User Schedule>        PHYSICAL EXAM:    T(C): 36.7 (05-15-23 @ 15:32), Max: 36.7 (05-15-23 @ 15:32)  HR: 73 (05-15-23 @ 15:32) (71 - 93)  BP: 123/78 (05-15-23 @ 15:32) (117/78 - 131/84)  RR: 18 (05-15-23 @ 15:32) (17 - 18)  SpO2: 98% (05-15-23 @ 15:32) (95% - 98%)  Wt(kg): --    I&O's Summary    14 May 2023 07:01  -  15 May 2023 07:00  --------------------------------------------------------  IN: 1300 mL / OUT: 650 mL / NET: 650 mL    15 May 2023 07:01  -  15 May 2023 16:56  --------------------------------------------------------  IN: 480 mL / OUT: 500 mL / NET: -20 mL        Daily     Daily     Appearance: Normal	  HEENT:   Normal oral mucosa, PERRL, EOMI	  Cardiovascular: Normal S1 S2, No JVD, 2/6 Systolic murmur, No edema  Respiratory: Lungs clear to auscultation	  Psychiatry: A & O x 3, Mood & affect appropriate  Gastrointestinal:  Soft, Non-tender, + BS	  Skin: No rashes, No ecchymoses, No cyanosis  Neurologic: Non-focal  Extremities: Normal range of motion, No clubbing, cyanosis or edema  Vascular: Peripheral pulses palpable  bilaterally      TELEMETRY:  A-Flutter HR 70's occasional bradycardia with few less than 2 second pauses.	     	          LABS:	 	                          13.7   5.89  )-----------( 122      ( 15 May 2023 02:13 )             40.0     05-15    138  |  102  |  38.1<H>  ----------------------------<  100<H>  4.6   |  26.0  |  0.89    Ca    9.1      15 May 2023 02:13  Phos  3.6     05-15  Mg     2.0     05-15    TPro  5.7<L>  /  Alb  3.8  /  TBili  0.7  /  DBili  x   /  AST  14  /  ALT  16  /  AlkPhos  67  05-15      ASSESSMENT:  89 yo female with hx of A-fib on Eliquis, HTN, and HLD who presents to the ED today for SOB.  Patient had a viral infection about 2 weeks ago and has since felt SOB. Went to PMD for check up today and found to be in rapid afib and sent to ED    No SOB, O2 Sat's 93-96% on RA, A-Flutter on CM, HR 70's occasional episodes of bradycardia with a few less than 2 second pauses while sleeping.   Will decrease Cardizem CD to 180 mg PO daily  Echo shows LVEF 50-55%, Mild MR and TR.   BNP greatly improved 906 today (4798 on admission)  B/L LE edema has resolved.  Continue PO Lasix daily    PLAN: 	  Controlled A-Fib/Flutter had a few episodes of bradycardia with less than 2 second pauses  Will decrease Cardizem CD to 180 mg PO daily  Continue Lasix 20 mg PO daily  Continue Toprol XL 50 mg BID  Continue Eliquis 2.5 mg PO BID  Pt cardiac stable for discharge back to the [facility]  Follow up in office in 2 weeks

## 2023-05-15 NOTE — PROGRESS NOTE ADULT - SUBJECTIVE AND OBJECTIVE BOX
BRITANY GALAN    [id number]    89y      Female    INTERVAL HPI/OVERNIGHT EVENTS:    REVIEW OF SYSTEMS:    CONSTITUTIONAL: No fever, weight loss, or fatigue  RESPIRATORY: No cough, wheezing, hemoptysis; No shortness of breath  CARDIOVASCULAR: No chest pain, palpitations  GASTROINTESTINAL: No abdominal or epigastric pain. No nausea, vomiting  NEUROLOGICAL: No headaches, memory loss, loss of strength.  MISCELLANEOUS:      Vital Signs Last 24 Hrs  T(C): 36.5 (16 May 2023 05:00), Max: 36.7 (15 May 2023 15:32)  T(F): 97.7 (16 May 2023 05:00), Max: 98 (15 May 2023 15:32)  HR: 71 (16 May 2023 05:00) (47 - 73)  BP: 112/73 (15 May 2023 23:48) (110/68 - 123/78)  BP(mean): --  RR: 17 (16 May 2023 05:00) (17 - 18)  SpO2: 97% (16 May 2023 05:00) (93% - 98%)    Parameters below as of 16 May 2023 05:00  Patient On (Oxygen Delivery Method): room air        PHYSICAL EXAM:    GENERAL: NAD, well-groomed  HEENT: PERRL, +EOMI  NECK: soft, Supple, No JVD,   CHEST/LUNG: Clear to auscultation bilaterally; No wheezing  HEART: S1S2+, Regular rate and rhythm; No murmurs, rubs, or gallops  ABDOMEN: Soft, Nontender, Nondistended; Bowel sounds present  EXTREMITIES:  2+ Peripheral Pulses, No clubbing, cyanosis, or edema  SKIN: No rashes or lesions  NEURO: AAOX3, no focal deficits, no motor r sensory loss  PSYCH: normal mood      LABS:                        13.7   5.89  )-----------( 122      ( 15 May 2023 02:13 )             40.0     05-15    142  |  105  |  43.0<H>  ----------------------------<  107<H>  4.4   |  25.0  |  0.86    Ca    9.0      15 May 2023 23:05  Phos  4.4     05-15  Mg     2.0     05-15    TPro  5.7<L>  /  Alb  3.8  /  TBili  0.7  /  DBili  x   /  AST  14  /  ALT  16  /  AlkPhos  67  05-15            MEDICATIONS  (STANDING):  apixaban 2.5 milliGRAM(s) Oral two times a day  atorvastatin 20 milliGRAM(s) Oral at bedtime  chlorhexidine 2% Cloths 1 Application(s) Topical <User Schedule>  diltiazem    milliGRAM(s) Oral daily  furosemide    Tablet 20 milliGRAM(s) Oral daily  metoprolol succinate ER 50 milliGRAM(s) Oral <User Schedule>    MEDICATIONS  (PRN):  acetaminophen     Tablet .. 650 milliGRAM(s) Oral every 6 hours PRN Temp greater or equal to 38C (100.4F), Mild Pain (1 - 3), Moderate Pain (4 - 6)  albuterol/ipratropium for Nebulization 3 milliLiter(s) Nebulizer every 6 hours PRN Shortness of Breath and/or Wheezing  atropine Injectable 0.5 milliGRAM(s) IV Push once PRN symptomatic bradycardia  ondansetron Injectable 4 milliGRAM(s) IV Push every 6 hours PRN Nausea and/or Vomiting      RADIOLOGY & ADDITIONAL TESTS:   BRITANY GALAN    [id number]    89y      Female    INTERVAL HPI/OVERNIGHT EVENTS: patient being seen for afib and pauses. patient denies any complaints    REVIEW OF SYSTEMS:    CONSTITUTIONAL: No fever, weight loss, or fatigue  RESPIRATORY: No cough, wheezing, hemoptysis; No shortness of breath  CARDIOVASCULAR: No chest pain, palpitations  GASTROINTESTINAL: No abdominal or epigastric pain. No nausea, vomiting  NEUROLOGICAL: No headaches, memory loss, loss of strength.  MISCELLANEOUS:      Vital Signs Last 24 Hrs  T(C): 36.5 (16 May 2023 05:00), Max: 36.7 (15 May 2023 15:32)  T(F): 97.7 (16 May 2023 05:00), Max: 98 (15 May 2023 15:32)  HR: 71 (16 May 2023 05:00) (47 - 73)  BP: 112/73 (15 May 2023 23:48) (110/68 - 123/78)  BP(mean): --  RR: 17 (16 May 2023 05:00) (17 - 18)  SpO2: 97% (16 May 2023 05:00) (93% - 98%)    Parameters below as of 16 May 2023 05:00  Patient On (Oxygen Delivery Method): room air        PHYSICAL EXAM:      CONSTITUTIONAL: NAD,   ENMT: Moist oral mucosa, no pharyngeal injection or exudates;  RESPIRATORY: Normal respiratory effort; lungs are clear to auscultation bilaterally  CARDIOVASCULAR: irregular rhythm, tachycardic,  normal S1 and S2,; trace lower extremity edema;  ABDOMEN: Nontender to palpation, normoactive bowel sounds, no rebound/guarding;   MUSCLOSKELETAL:   no joint swelling or tenderness to palpation  PSYCH: A+O to person, place, and time; affect appropriate  NEUROLOGY: CN 2-12 are intact and symmetric; no gross sensory deficits;   SKIN: No rashes; no palpable lesions    LABS:                        13.7   5.89  )-----------( 122      ( 15 May 2023 02:13 )             40.0     05-15    142  |  105  |  43.0<H>  ----------------------------<  107<H>  4.4   |  25.0  |  0.86    Ca    9.0      15 May 2023 23:05  Phos  4.4     05-15  Mg     2.0     05-15    TPro  5.7<L>  /  Alb  3.8  /  TBili  0.7  /  DBili  x   /  AST  14  /  ALT  16  /  AlkPhos  67  05-15            MEDICATIONS  (STANDING):  apixaban 2.5 milliGRAM(s) Oral two times a day  atorvastatin 20 milliGRAM(s) Oral at bedtime  chlorhexidine 2% Cloths 1 Application(s) Topical <User Schedule>  diltiazem    milliGRAM(s) Oral daily  furosemide    Tablet 20 milliGRAM(s) Oral daily  metoprolol succinate ER 50 milliGRAM(s) Oral <User Schedule>    MEDICATIONS  (PRN):  acetaminophen     Tablet .. 650 milliGRAM(s) Oral every 6 hours PRN Temp greater or equal to 38C (100.4F), Mild Pain (1 - 3), Moderate Pain (4 - 6)  albuterol/ipratropium for Nebulization 3 milliLiter(s) Nebulizer every 6 hours PRN Shortness of Breath and/or Wheezing  atropine Injectable 0.5 milliGRAM(s) IV Push once PRN symptomatic bradycardia  ondansetron Injectable 4 milliGRAM(s) IV Push every 6 hours PRN Nausea and/or Vomiting      RADIOLOGY & ADDITIONAL TESTS:

## 2023-05-15 NOTE — DISCHARGE NOTE PROVIDER - NSDCMRMEDTOKEN_GEN_ALL_CORE_FT
atorvastatin 20 mg oral tablet: 1 orally  Cardizem  mg/24 hours oral capsule, extended release: 1 orally  Eliquis 2.5 mg oral tablet: 1 orally 2 times a day  lisinopril 10 mg oral tablet: 1 orally  metoprolol succinate 50 mg oral tablet, extended release: 1 tab(s) orally 2 times a day  potassium chloride 10 mEq oral capsule, extended release: 1 cap(s) orally once a day   atorvastatin 20 mg oral tablet: 1 tablet orally once a day (at bedtime)  Cardizem  mg/24 hours oral capsule, extended release: 1 capsule, extended release orally once a day  Eliquis 2.5 mg oral tablet: 1 orally 2 times a day  furosemide 40 mg oral tablet: 0.5 tab(s) orally once a day  lisinopril 10 mg oral tablet: 0.5 tablet orally once a day  metoprolol succinate 50 mg oral tablet, extended release: 1 tab(s) orally 2 times a day  potassium chloride 10 mEq oral capsule, extended release: 1 cap(s) orally once a day   atorvastatin 20 mg oral tablet: 1 tablet orally once a day (at bedtime)  dilTIAZem 240 mg/24 hours oral capsule, extended release: 1 cap(s) orally once a day  Eliquis 2.5 mg oral tablet: 1 orally 2 times a day  furosemide 40 mg oral tablet: 0.5 tab(s) orally once a day  lisinopril 10 mg oral tablet: 0.5 tab(s) orally once a day  metoprolol succinate 50 mg oral tablet, extended release: 1 tab(s) orally 2 times a day  potassium chloride 10 mEq oral capsule, extended release: 1 cap(s) orally once a day   atorvastatin 20 mg oral tablet: 1 tablet orally once a day (at bedtime)  dilTIAZem 180 mg/24 hours oral capsule, extended release: 1 cap(s) orally once a day  Eliquis 2.5 mg oral tablet: 1 orally 2 times a day  furosemide 40 mg oral tablet: 0.5 tab(s) orally once a day  lisinopril 10 mg oral tablet: 0.5 tab(s) orally once a day  metoprolol succinate 50 mg oral tablet, extended release: 1 tab(s) orally 2 times a day  potassium chloride 10 mEq oral capsule, extended release: 1 cap(s) orally once a day

## 2023-05-15 NOTE — DISCHARGE NOTE PROVIDER - HOSPITAL COURSE
89 year old female with history of Chronic A. Fib on Eliquis, HTN, HLD and Breast Cancer s/p Left Mastectomy . Patient had a viral infection about 2 weeks ago and has since felt SOB. Went to PMD for check up today and found to be in rapid afib and sent to ED.  In ER, she was found to be in Acute CHF with Rapid A. Fib with RVR. She was given Cardizem 10 mg IVP x 1, Cardizem 90 mg PO x 1 and Lasix 40 mg x 1. BNP on admission 4798, Troponin negative x 2. CT chest shows B/L pleural effusions, negative for PE. Improved after IV Lasix. Seen in consultation by Cardiology. 89 year old female with history of Chronic A. Fib on Eliquis, HTN, HLD and Breast Cancer s/p Left Mastectomy sent by PMD for evaluation. As per patient, she had Flu 2-3 weeks ago and since then she has been having exertional dyspnea, orthopnea and paroxysmal nocturnal dyspnea. Found to be in aflutter. Patient has 2.5 sec pause on 5/14. patient cleared by cardio for dc.     Acute Heart Failure preserved EF  - ECHO  EF 50-55%  - Lasix  -  Metoprolol 50 mg BID,  - Cardiology following     Chronic A. Fib with RVR with 2.5 sec pause  - Metoprolol ER 50 mg to BID and cardizem 240mg daily  - Eliquis 2.5 mg BID  - Cardiology, Dr. Jean following    HTN  -  Metoprolol and Cardizem   lisinopril dose decreased     HLD  -  Lipitor     hypokalemia - resolved     Thrombocytopenia   - stable    dc to [place] 89 year old female with history of Chronic A. Fib on Eliquis, HTN, HLD and Breast Cancer s/p Left Mastectomy sent by PMD for evaluation. As per patient, she had Flu 2-3 weeks ago and since then she has been having exertional dyspnea, orthopnea and paroxysmal nocturnal dyspnea. Found to be in aflutter. Patient has 4 sec pause on 5/15. Cardizem decreased back to 180 mg daily. Heartrates controlled , no further pauses noted. Patient cleared by cardio for dc.     Acute Heart Failure preserved EF  - ECHO  EF 50-55%  - Lasix  -  Metoprolol 50 mg BID,  - Cardiology following     Chronic A. Fib with RVR with 4 sec pause  - Metoprolol ER 50 mg to BID and cardizem 240mg daily  - Eliquis 2.5 mg BID  - Cardiology, Dr. Jean following    HTN  -  Metoprolol and Cardizem   lisinopril dose decreased     HLD  -  Lipitor     hypokalemia - resolved     Thrombocytopenia   - stable    dc to [facility] 89 year old female with history of Chronic A. Fib on Eliquis, HTN, HLD and Breast Cancer s/p Left Mastectomy sent by PMD for evaluation. As per patient, she had Flu 2-3 weeks ago and since then she has been having exertional dyspnea, orthopnea and paroxysmal nocturnal dyspnea. Found to be in aflutter. Patient has 4 sec pause on 5/15. Cardizem decreased back to 180 mg daily. Heartrates controlled , no further pauses noted. Patient cleared by cardio for dc.     Acute Heart Failure preserved EF  - ECHO  EF 50-55%  - Lasix  -  Metoprolol 50 mg BID,  - Cardiology following     Chronic A. Fib with RVR with 4 sec pause  - Metoprolol ER 50 mg to BID and cardizem 180mg daily  - Eliquis 2.5 mg BID  - Cardiology, Dr. Jean following    HTN  -  Metoprolol and Cardizem   lisinopril dose decreased     HLD  -  Lipitor     hypokalemia - resolved     Thrombocytopenia   - stable    dc to [facility]

## 2023-05-15 NOTE — DIETITIAN INITIAL EVALUATION ADULT - ADD RECOMMEND
1) Encourage po intake with HVB protein snacks and meal choices , monitor diet tolerance, and provide assistance at meals as needed  2) Obtain daily weights to monitor trends.   3) Monitor nutrition related labs; renal, glucose

## 2023-05-15 NOTE — PROGRESS NOTE ADULT - REASON FOR ADMISSION
Shortness of breath
Shortness of breath, atrial flutter
Shortness of breath

## 2023-05-15 NOTE — DIETITIAN INITIAL EVALUATION ADULT - PERTINENT LABORATORY DATA
05-15    138  |  102  |  38.1<H>  ----------------------------<  100<H>  4.6   |  26.0  |  0.89    Ca    9.1      15 May 2023 02:13  Phos  3.6     05-15  Mg     2.0     05-15    TPro  5.7<L>  /  Alb  3.8  /  TBili  0.7  /  DBili  x   /  AST  14  /  ALT  16  /  AlkPhos  67  05-15  05-15 Na138 mmol/L Glu 100 mg/dL<H> K+ 4.6 mmol/L Cr  0.89 mg/dL BUN 38.1 mg/dL<H> Phos 3.6 mg/dL Alb 3.8 g/dL PAB n/a

## 2023-05-15 NOTE — CHART NOTE - NSCHARTNOTEFT_GEN_A_CORE
Notified by RN pt intermittently bradycardic (HR fluctuating 35-70) and having cardiac pauses increasing in frequency on cardiac monitor, longest pause 4.21 seconds, pt asymptomatic. Pt had episodes of 3 cardiac pauses.   Patient A&O x3, awake, lying in bed, NAD.  Patient denies palpitations, dizziness, lightheadedness, CP, chest pressure, SOB, abd pain or discomfort, N/V.  Pt is a 89 y/o F with PHM Afib on Eliquis, HTN, HLD admitted for acute HF being followed by Cardiology for afib/flutter, intermittent bradycardia and cardiac pauses.   VS: T 97.5, /68, P 47, RR 17, O2 sat 95% on RA  GENERAL: calm, NAD  CHEST/LUNG: CTA bilaterally. No wheezing, rhonchi, rales  HEART: +S1/S2, irregular rate and rhythm  ABDOMEN: Soft, Nontender, Nondistended. Bowel sounds present x4 quads  EXTREMITIES:  2+ Peripheral Pulses, No edema.  NERVOUS SYSTEM:  Alert & Oriented X3    -STAT EKG: HR 71, afib, no acute changes from prior EKG.   - Pt received Toprol XL 50mg PO @17:17. Likely contributing factor. Pt seen by Dr. Jean today, Cardizem dose decreased. Recommend adjusting BB dose, will hold in AM.   - STAT BMP, Mg, Phos, Troponin x1.  - Called Cardiology team, discussed with covering PA, recommend atropine at bedside and continue to monitor. Will f/u with pt. Agree to hold AM dose BB.   - Recommend f/u with Cardiologist in AM for further recommendations.  - Pt is hemodynamically stable and asymptomatic at this time. Will continue to monitor.
RN called patient had two episodes of atrial flutter w/ >2 second pauses. Pt asymptomatic and sleeping  89 yo female with hx of Atrial arrhythmias on Eliquis, HTN, and HLD, admitted for dyspnea and palpitations. Pt denies CP, palpitations, SOB, dyspnea or other complaints at this time.     T(C): 36.5   HR: 71 (05-15-23 @ 02:01) (71 - 93)  BP: 123/83 (05-15-23 @ 02:01) (108/73 - 135/84)  RR: 17 (05-15-23 @ 02:01) (17 - 18)  SpO2: 97% (05-15-23 @ 02:01) (95% - 98%)    CONSTITUTIONAL: Well groomed, sleeping   EYES: PERRLA and symmetric, EOMI, No conjunctival or scleral injection, non-icteric  ENMT: Oral mucosa with moist membranes. Normal dentition; no pharyngeal injection or exudates  RESP: No respiratory distress, no use of accessory muscles; CTA b/l, no WRR  CV: RRR, +S1S2, no MRG; no JVD  SKIN: No rashes or ulcers noted  PSYCH: pleasant, mood appropriate     EKG: atrial flutter w/ 4:1 AV conduction    Assessment/Plan:   -pt asymptomatic and VSS   -labs ordered and reviewed   -cardio following   -Consider decreasing or d/c use of BB and CBB together due to risk of AV block   -Will continue to monitor labs, VS and sx
This patient is covered by Dr. Jean. [facility] Cardiology will not follow. Discussed w/ Dr. Fishman who will call Dr. Jean
patient still with aflutter uncontrolled and soft bps  discussed with Cardiology Dr. Jean to given 0.5mg of dig IV  he will see later this evening and make additional adjustments
RN called patient had 2.63 second pause, asymptomatic, VSS.   89 yo female with hx of Atrial arrhythmias on Eliquis, HTN, and HLD, admitted 5/9/2023 with 2 weeks of dyspnea and palpitations. 12 lead ECG done 5/9/2023 demonstrated atrial flutter with VR of 133 bpm.  Pt denies CP, palpitations, SOB, dyspnea or other complaints at this time.     T(C): 36.9 (05-14-23 @ 05:36), Max: 36.9 (05-14-23 @ 05:36)  HR: 72 (05-14-23 @ 05:36) (72 - 99)  BP: 127/84 (05-14-23 @ 05:36) (118/77 - 134/79)  RR: 18 (05-14-23 @ 05:36) (18 - 18)  SpO2: 96% (05-14-23 @ 05:36) (95% - 96%)    CONSTITUTIONAL: Well groomed, no apparent distress  EYES: PERRLA and symmetric, EOMI, No conjunctival or scleral injection, non-icteric  ENMT: Oral mucosa with moist membranes. Normal dentition; no pharyngeal injection or exudates  RESP: No respiratory distress, no use of accessory muscles; CTA b/l, no WRR  CV: irregular rate and rhythm; no edema   SKIN: No rashes or ulcers noted  PSYCH: Appropriate insight/judgment; A+O x 3    EKG: atrial flutter w/ 4:1 SV conduction @ 72    Atrial flutter w/ 2.63 second pause   -labs ordered   -EKG Ordered and reviewed  -cardio following   -will continue to monitor labs, VS and sx

## 2023-05-15 NOTE — PROGRESS NOTE ADULT - PROVIDER SPECIALTY LIST ADULT
Cardiology
Hospitalist
Internal Medicine
Intervent Cardiology
Hospitalist
Internal Medicine
Hospitalist
Internal Medicine
Intervent Cardiology
Cardiology

## 2023-05-15 NOTE — DIETITIAN INITIAL EVALUATION ADULT - OTHER INFO
89 year old female with history of Chronic A. Fib on Eliquis, HTN, HLD and Breast Cancer s/p Left Mastectomy sent by PMD for evaluation. As per patient, she had Flu 2-3 weeks ago and since then she has been having exertional dyspnea, orthopnea and paroxysmal nocturnal dyspnea. Found to be in aflutter. Patient has 2.5 sec pause on 5/14

## 2023-05-16 ENCOUNTER — TRANSCRIPTION ENCOUNTER (OUTPATIENT)
Age: 88
End: 2023-05-16

## 2023-05-16 VITALS
OXYGEN SATURATION: 99 % | TEMPERATURE: 97 F | RESPIRATION RATE: 18 BRPM | HEART RATE: 74 BPM | SYSTOLIC BLOOD PRESSURE: 103 MMHG | DIASTOLIC BLOOD PRESSURE: 78 MMHG

## 2023-05-16 LAB
ALBUMIN SERPL ELPH-MCNC: 3.9 G/DL — SIGNIFICANT CHANGE UP (ref 3.3–5.2)
ALP SERPL-CCNC: 75 U/L — SIGNIFICANT CHANGE UP (ref 40–120)
ALT FLD-CCNC: 17 U/L — SIGNIFICANT CHANGE UP
ANION GAP SERPL CALC-SCNC: 12 MMOL/L — SIGNIFICANT CHANGE UP (ref 5–17)
AST SERPL-CCNC: 12 U/L — SIGNIFICANT CHANGE UP
BILIRUB SERPL-MCNC: 0.9 MG/DL — SIGNIFICANT CHANGE UP (ref 0.4–2)
BUN SERPL-MCNC: 38.8 MG/DL — HIGH (ref 8–20)
CALCIUM SERPL-MCNC: 9.3 MG/DL — SIGNIFICANT CHANGE UP (ref 8.4–10.5)
CHLORIDE SERPL-SCNC: 103 MMOL/L — SIGNIFICANT CHANGE UP (ref 96–108)
CO2 SERPL-SCNC: 26 MMOL/L — SIGNIFICANT CHANGE UP (ref 22–29)
CREAT SERPL-MCNC: 0.78 MG/DL — SIGNIFICANT CHANGE UP (ref 0.5–1.3)
EGFR: 72 ML/MIN/1.73M2 — SIGNIFICANT CHANGE UP
GLUCOSE SERPL-MCNC: 122 MG/DL — HIGH (ref 70–99)
MAGNESIUM SERPL-MCNC: 2 MG/DL — SIGNIFICANT CHANGE UP (ref 1.6–2.6)
POTASSIUM SERPL-MCNC: 4.2 MMOL/L — SIGNIFICANT CHANGE UP (ref 3.5–5.3)
POTASSIUM SERPL-SCNC: 4.2 MMOL/L — SIGNIFICANT CHANGE UP (ref 3.5–5.3)
PROT SERPL-MCNC: 6 G/DL — LOW (ref 6.6–8.7)
SODIUM SERPL-SCNC: 141 MMOL/L — SIGNIFICANT CHANGE UP (ref 135–145)

## 2023-05-16 PROCEDURE — 99285 EMERGENCY DEPT VISIT HI MDM: CPT | Mod: 25

## 2023-05-16 PROCEDURE — 85025 COMPLETE CBC W/AUTO DIFF WBC: CPT

## 2023-05-16 PROCEDURE — 87641 MR-STAPH DNA AMP PROBE: CPT

## 2023-05-16 PROCEDURE — C8929: CPT

## 2023-05-16 PROCEDURE — 93005 ELECTROCARDIOGRAM TRACING: CPT

## 2023-05-16 PROCEDURE — 36415 COLL VENOUS BLD VENIPUNCTURE: CPT

## 2023-05-16 PROCEDURE — 96375 TX/PRO/DX INJ NEW DRUG ADDON: CPT

## 2023-05-16 PROCEDURE — 87640 STAPH A DNA AMP PROBE: CPT

## 2023-05-16 PROCEDURE — 96374 THER/PROPH/DIAG INJ IV PUSH: CPT

## 2023-05-16 PROCEDURE — 0225U NFCT DS DNA&RNA 21 SARSCOV2: CPT

## 2023-05-16 PROCEDURE — 80048 BASIC METABOLIC PNL TOTAL CA: CPT

## 2023-05-16 PROCEDURE — G1004: CPT

## 2023-05-16 PROCEDURE — 82550 ASSAY OF CK (CPK): CPT

## 2023-05-16 PROCEDURE — 84484 ASSAY OF TROPONIN QUANT: CPT

## 2023-05-16 PROCEDURE — 84100 ASSAY OF PHOSPHORUS: CPT

## 2023-05-16 PROCEDURE — 80053 COMPREHEN METABOLIC PANEL: CPT

## 2023-05-16 PROCEDURE — 99239 HOSP IP/OBS DSCHRG MGMT >30: CPT

## 2023-05-16 PROCEDURE — 85027 COMPLETE CBC AUTOMATED: CPT

## 2023-05-16 PROCEDURE — 84443 ASSAY THYROID STIM HORMONE: CPT

## 2023-05-16 PROCEDURE — 83735 ASSAY OF MAGNESIUM: CPT

## 2023-05-16 PROCEDURE — 83880 ASSAY OF NATRIURETIC PEPTIDE: CPT

## 2023-05-16 PROCEDURE — 71275 CT ANGIOGRAPHY CHEST: CPT | Mod: MG

## 2023-05-16 PROCEDURE — 71045 X-RAY EXAM CHEST 1 VIEW: CPT

## 2023-05-16 RX ORDER — DILTIAZEM HCL 120 MG
1 CAPSULE, EXT RELEASE 24 HR ORAL
Qty: 30 | Refills: 0
Start: 2023-05-16 | End: 2023-06-14

## 2023-05-16 RX ORDER — METOPROLOL TARTRATE 50 MG
50 TABLET ORAL
Refills: 0 | Status: DISCONTINUED | OUTPATIENT
Start: 2023-05-16 | End: 2023-05-16

## 2023-05-16 RX ADMIN — Medication 50 MILLIGRAM(S): at 09:24

## 2023-05-16 RX ADMIN — Medication 180 MILLIGRAM(S): at 06:10

## 2023-05-16 RX ADMIN — APIXABAN 2.5 MILLIGRAM(S): 2.5 TABLET, FILM COATED ORAL at 17:27

## 2023-05-16 RX ADMIN — CHLORHEXIDINE GLUCONATE 1 APPLICATION(S): 213 SOLUTION TOPICAL at 06:10

## 2023-05-16 RX ADMIN — APIXABAN 2.5 MILLIGRAM(S): 2.5 TABLET, FILM COATED ORAL at 06:09

## 2023-05-16 RX ADMIN — Medication 20 MILLIGRAM(S): at 06:09

## 2023-05-16 NOTE — DISCHARGE NOTE NURSING/CASE MANAGEMENT/SOCIAL WORK - PATIENT PORTAL LINK FT
You can access the FollowMyHealth Patient Portal offered by [facility] by registering at the following website: [email or web address]. By joining [employer]’s FollowMyHealth portal, you will also be able to view your health information using other applications (apps) compatible with our system.

## 2023-05-16 NOTE — DISCHARGE NOTE NURSING/CASE MANAGEMENT/SOCIAL WORK - NSDCPEFALRISK_GEN_ALL_CORE
For information on Fall & Injury Prevention, visit: [email or web address] OR  [email or web address] OR  https://www.cdc.gov/steadi/patient.html

## 2023-05-21 ENCOUNTER — EMERGENCY (EMERGENCY)
Facility: HOSPITAL | Age: 88
LOS: 1 days | Discharge: HOME | End: 2023-05-21
Attending: EMERGENCY MEDICINE
Payer: MEDICARE

## 2023-05-21 VITALS
RESPIRATION RATE: 18 BRPM | HEIGHT: 64 IN | WEIGHT: 130.07 LBS | TEMPERATURE: 98 F | DIASTOLIC BLOOD PRESSURE: 78 MMHG | OXYGEN SATURATION: 97 % | SYSTOLIC BLOOD PRESSURE: 125 MMHG | HEART RATE: 97 BPM

## 2023-05-21 VITALS
HEART RATE: 71 BPM | RESPIRATION RATE: 18 BRPM | OXYGEN SATURATION: 96 % | DIASTOLIC BLOOD PRESSURE: 51 MMHG | TEMPERATURE: 98 F | SYSTOLIC BLOOD PRESSURE: 135 MMHG

## 2023-05-21 DIAGNOSIS — Z90.12 ACQUIRED ABSENCE OF LEFT BREAST AND NIPPLE: Chronic | ICD-10-CM

## 2023-05-21 LAB
ALBUMIN SERPL ELPH-MCNC: 4.4 G/DL — SIGNIFICANT CHANGE UP (ref 3.3–5.2)
ALP SERPL-CCNC: 81 U/L — SIGNIFICANT CHANGE UP (ref 40–120)
ALT FLD-CCNC: 12 U/L — SIGNIFICANT CHANGE UP
ANION GAP SERPL CALC-SCNC: 15 MMOL/L — SIGNIFICANT CHANGE UP (ref 5–17)
AST SERPL-CCNC: 14 U/L — SIGNIFICANT CHANGE UP
BASOPHILS # BLD AUTO: 0.03 K/UL — SIGNIFICANT CHANGE UP (ref 0–0.2)
BASOPHILS NFR BLD AUTO: 0.4 % — SIGNIFICANT CHANGE UP (ref 0–2)
BILIRUB SERPL-MCNC: 0.8 MG/DL — SIGNIFICANT CHANGE UP (ref 0.4–2)
BUN SERPL-MCNC: 41.7 MG/DL — HIGH (ref 8–20)
CALCIUM SERPL-MCNC: 9.1 MG/DL — SIGNIFICANT CHANGE UP (ref 8.4–10.5)
CHLORIDE SERPL-SCNC: 104 MMOL/L — SIGNIFICANT CHANGE UP (ref 96–108)
CO2 SERPL-SCNC: 23 MMOL/L — SIGNIFICANT CHANGE UP (ref 22–29)
CREAT SERPL-MCNC: 0.94 MG/DL — SIGNIFICANT CHANGE UP (ref 0.5–1.3)
EGFR: 57 ML/MIN/1.73M2 — LOW
EOSINOPHIL # BLD AUTO: 0.06 K/UL — SIGNIFICANT CHANGE UP (ref 0–0.5)
EOSINOPHIL NFR BLD AUTO: 0.8 % — SIGNIFICANT CHANGE UP (ref 0–6)
GLUCOSE SERPL-MCNC: 138 MG/DL — HIGH (ref 70–99)
HCT VFR BLD CALC: 41.8 % — SIGNIFICANT CHANGE UP (ref 34.5–45)
HGB BLD-MCNC: 14.1 G/DL — SIGNIFICANT CHANGE UP (ref 11.5–15.5)
IMM GRANULOCYTES NFR BLD AUTO: 0.7 % — SIGNIFICANT CHANGE UP (ref 0–0.9)
LYMPHOCYTES # BLD AUTO: 1.41 K/UL — SIGNIFICANT CHANGE UP (ref 1–3.3)
LYMPHOCYTES # BLD AUTO: 19.4 % — SIGNIFICANT CHANGE UP (ref 13–44)
MCHC RBC-ENTMCNC: 28.8 PG — SIGNIFICANT CHANGE UP (ref 27–34)
MCHC RBC-ENTMCNC: 33.7 GM/DL — SIGNIFICANT CHANGE UP (ref 32–36)
MCV RBC AUTO: 85.5 FL — SIGNIFICANT CHANGE UP (ref 80–100)
MONOCYTES # BLD AUTO: 1.01 K/UL — HIGH (ref 0–0.9)
MONOCYTES NFR BLD AUTO: 13.9 % — SIGNIFICANT CHANGE UP (ref 2–14)
NEUTROPHILS # BLD AUTO: 4.7 K/UL — SIGNIFICANT CHANGE UP (ref 1.8–7.4)
NEUTROPHILS NFR BLD AUTO: 64.8 % — SIGNIFICANT CHANGE UP (ref 43–77)
NT-PROBNP SERPL-SCNC: 1716 PG/ML — HIGH (ref 0–300)
PLATELET # BLD AUTO: 178 K/UL — SIGNIFICANT CHANGE UP (ref 150–400)
POTASSIUM SERPL-MCNC: 4.1 MMOL/L — SIGNIFICANT CHANGE UP (ref 3.5–5.3)
POTASSIUM SERPL-SCNC: 4.1 MMOL/L — SIGNIFICANT CHANGE UP (ref 3.5–5.3)
PROT SERPL-MCNC: 6.6 G/DL — SIGNIFICANT CHANGE UP (ref 6.6–8.7)
RBC # BLD: 4.89 M/UL — SIGNIFICANT CHANGE UP (ref 3.8–5.2)
RBC # FLD: 14.9 % — HIGH (ref 10.3–14.5)
SODIUM SERPL-SCNC: 142 MMOL/L — SIGNIFICANT CHANGE UP (ref 135–145)
TROPONIN T SERPL-MCNC: <0.01 NG/ML — SIGNIFICANT CHANGE UP (ref 0–0.06)
WBC # BLD: 7.26 K/UL — SIGNIFICANT CHANGE UP (ref 3.8–10.5)
WBC # FLD AUTO: 7.26 K/UL — SIGNIFICANT CHANGE UP (ref 3.8–10.5)

## 2023-05-21 PROCEDURE — 71045 X-RAY EXAM CHEST 1 VIEW: CPT

## 2023-05-21 PROCEDURE — 85025 COMPLETE CBC W/AUTO DIFF WBC: CPT

## 2023-05-21 PROCEDURE — 96374 THER/PROPH/DIAG INJ IV PUSH: CPT

## 2023-05-21 PROCEDURE — 71045 X-RAY EXAM CHEST 1 VIEW: CPT | Mod: 26

## 2023-05-21 PROCEDURE — 99285 EMERGENCY DEPT VISIT HI MDM: CPT

## 2023-05-21 PROCEDURE — 36415 COLL VENOUS BLD VENIPUNCTURE: CPT

## 2023-05-21 PROCEDURE — 93005 ELECTROCARDIOGRAM TRACING: CPT

## 2023-05-21 PROCEDURE — 80053 COMPREHEN METABOLIC PANEL: CPT

## 2023-05-21 PROCEDURE — 93010 ELECTROCARDIOGRAM REPORT: CPT

## 2023-05-21 PROCEDURE — 83880 ASSAY OF NATRIURETIC PEPTIDE: CPT

## 2023-05-21 PROCEDURE — 84484 ASSAY OF TROPONIN QUANT: CPT

## 2023-05-21 PROCEDURE — 99285 EMERGENCY DEPT VISIT HI MDM: CPT | Mod: 25

## 2023-05-21 RX ORDER — FUROSEMIDE 40 MG
20 TABLET ORAL ONCE
Refills: 0 | Status: COMPLETED | OUTPATIENT
Start: 2023-05-21 | End: 2023-05-21

## 2023-05-21 RX ADMIN — Medication 20 MILLIGRAM(S): at 17:56

## 2023-05-21 NOTE — ED ADULT NURSE NOTE - OBJECTIVE STATEMENT
pt care assumed at 1545, no apparent distress noted at this time. pt received A&Ox4 sitting in bed comfortably pt care assumed at 1545, no apparent distress noted at this time. pt received A&Ox4 sitting in bed comfortably. pt c/o increased shortness of breath today. pt states she received O2 in the ambulance and now her breathing feels better. pt is 98% O2 sat on room air at this time. lung sounds are diminished b/l. pt denies other complaints at this time.

## 2023-05-21 NOTE — ED PROVIDER NOTE - CLINICAL SUMMARY MEDICAL DECISION MAKING FREE TEXT BOX
Patient is a 89-year-old female with past medical history of A-fib on Eliquis hypertension hyperlipidemia breast cancer status post left mastectomy 20 years ago who presents to the ED complaining of shortness of breath.

## 2023-05-21 NOTE — ED ADULT NURSE NOTE - CHIEF COMPLAINT QUOTE
pt biba c/o SOB x1 day, pt denies cp @ this time, was recently d/c'd from [facility] for chf. EKG obtained on arrival.

## 2023-05-21 NOTE — ED PROVIDER NOTE - PATIENT PORTAL LINK FT
You can access the FollowMyHealth Patient Portal offered by [facility] by registering at the following website: [email or web address]. By joining [employer]’s FollowMyHealth portal, you will also be able to view your health information using other applications (apps) compatible with our system.

## 2023-05-21 NOTE — ED PROVIDER NOTE - NSICDXPASTMEDICALHX_GEN_ALL_CORE_FT
PAST MEDICAL HISTORY:  Chronic atrial fibrillation     Chronic CHF     HLD (hyperlipidemia)     Mild HTN

## 2023-05-21 NOTE — ED ADULT TRIAGE NOTE - TEMPERATURE IN CELSIUS (DEGREES C)
Patient returned call and name and date of birth verified. Inform patient of final urine culture results that shows evidence of contamination.  If the patient's symptoms have improved with Macrobid she may complete course if not discontinue the antibiotic and follow-up with PCP.    States she is feeling better, and is without further questions.     36.4

## 2023-05-21 NOTE — ED PROVIDER NOTE - ATTENDING CONTRIBUTION TO CARE
I, Leonila Cantrell, have personally seen and examined this patient. I have fully participated in the care of this patient. I have reviewed all pertinent clinical information, including history, physical exam, plan and the Resident's note and agree except as noted below.     Pt with h/o CHF and recent admission had SOB today that has resolved, BNP mildly elevated compared to previously. CXR clear. EKG nonischemic. will give 1 dose IV lasix. plan to dc

## 2023-05-21 NOTE — ED PROVIDER NOTE - PHYSICAL EXAMINATION
Gen: AAOx3, NAD  HEENT: Normocephalic atraumatic. EOMI. No scleral icterus. Moist mucus membranes.  CV: Irregularly irregular. Audible S1 and S2. No murmurs. 2+ radial and PT pulses   Pulm: Crackles in R lower lung field. No accessory muscle use or respiratory distress.  Abdomen: soft, normoactive BS, non distended, nontender, no rebound, no guarding  Musculoskeletal:  Moving all extremities equally. No gross deformity. No tenderness to palpation.  Skin: No rashes or lesions. Warm.  Neurologic: No focal neurological deficits. CN II-XII grossly intact.  : no CVA tenderness  Psych: Appropriate mood and affect. Cooperative.

## 2023-05-21 NOTE — ED ADULT TRIAGE NOTE - CHIEF COMPLAINT QUOTE
pt biba c/o SOB x1 day, pt denies cp @ this time, was recently d/c'd from [facility] for chf. EKG obtained on arrival.

## 2023-05-21 NOTE — ED PROVIDER NOTE - NSFOLLOWUPINSTRUCTIONS_ED_ALL_ED_FT
1. Return to ED for worsening, progressive or any other concerning symptoms   2. Follow up with your primary care doctor in 2-3days   3. Continue your home medications and follow up with your cardiologist     Congestive Heart Failure (CHF)    Congestive heart failure is a chronic condition in which the heart has trouble pumping blood. In some cases of heart failure, fluid may back up into your lungs or you may have swelling (edema) in your lower legs. There are many causes of heart failure including high blood pressure, coronary artery disease, abnormal heart valves, heart muscle disease, lung disease, diabetes, etc. Symptoms include shortness of breath with activity or when lying flat, cough, swelling of the legs, fatigue, or increased urination during the night.     Treatment is aimed at managing the symptoms of heart failure and may include lifestyle changes, medications, or surgical procedures. Take medicines only as directed by your health care provider and do not stop unless instructed to do so. Eat heart-healthy foods with low or no trans/saturated fats, cholesterol and salt. Weigh yourself every day for early recognition of fluid accumulation.    SEEK IMMEDIATE MEDICAL CARE IF YOU HAVE ANY OF THE FOLLOWING SYMPTOMS: shortness of breath, change in mental status, chest pain, lightheadedness/dizziness/fainting, or worsening of symptoms including not being able to conduct normal physical activity.

## 2023-05-21 NOTE — ED ADULT NURSE NOTE - NSFALLHARMRISKINTERV_ED_ALL_ED

## 2023-05-21 NOTE — ED PROVIDER NOTE - OBJECTIVE STATEMENT
Patient is a 89-year-old female with past medical history of A-fib on Eliquis, hypertension, hyperlipidemia, CHF who presents to the ED complaining of shortness of breath.  Patient was recently admitted 5/9–16 for CHF and A-fib with RVR.  Patient reports feeling shortness of breath this morning around 10 AM.  Patient also adds that she has had increased dyspnea on exertion since her discharge from the hospital.  Patient also reports chronic productive cough.  Denies chest pain and fever.  Shortness of breath now resolved since receiving oxygen from EMS.  Patient is now with no complaints off oxygen.

## 2023-05-22 PROBLEM — I48.20 CHRONIC ATRIAL FIBRILLATION, UNSPECIFIED: Chronic | Status: ACTIVE | Noted: 2023-05-09

## 2023-05-22 PROBLEM — I10 ESSENTIAL (PRIMARY) HYPERTENSION: Chronic | Status: ACTIVE | Noted: 2023-05-09

## 2023-06-01 ENCOUNTER — NON-APPOINTMENT (OUTPATIENT)
Age: 88
End: 2023-06-01

## 2023-06-01 ENCOUNTER — APPOINTMENT (OUTPATIENT)
Dept: OPHTHALMOLOGY | Facility: CLINIC | Age: 88
End: 2023-06-01
Payer: MEDICARE

## 2023-06-01 PROCEDURE — 92012 INTRM OPH EXAM EST PATIENT: CPT

## 2023-06-01 PROCEDURE — 92134 CPTRZ OPH DX IMG PST SGM RTA: CPT

## 2023-06-13 PROBLEM — E78.5 HYPERLIPIDEMIA, UNSPECIFIED: Chronic | Status: ACTIVE | Noted: 2023-05-21

## 2023-06-13 PROBLEM — I50.9 HEART FAILURE, UNSPECIFIED: Chronic | Status: ACTIVE | Noted: 2023-05-21

## 2023-07-05 ENCOUNTER — EMERGENCY (EMERGENCY)
Facility: HOSPITAL | Age: 88
LOS: 1 days | Discharge: HOME | End: 2023-07-05
Attending: EMERGENCY MEDICINE
Payer: MEDICARE

## 2023-07-05 VITALS
OXYGEN SATURATION: 96 % | SYSTOLIC BLOOD PRESSURE: 114 MMHG | TEMPERATURE: 99 F | HEIGHT: 64 IN | RESPIRATION RATE: 20 BRPM | HEART RATE: 143 BPM | DIASTOLIC BLOOD PRESSURE: 88 MMHG | WEIGHT: 132.94 LBS

## 2023-07-05 VITALS
HEART RATE: 94 BPM | OXYGEN SATURATION: 95 % | RESPIRATION RATE: 16 BRPM | DIASTOLIC BLOOD PRESSURE: 68 MMHG | SYSTOLIC BLOOD PRESSURE: 131 MMHG

## 2023-07-05 DIAGNOSIS — Z90.12 ACQUIRED ABSENCE OF LEFT BREAST AND NIPPLE: Chronic | ICD-10-CM

## 2023-07-05 LAB
ALBUMIN SERPL ELPH-MCNC: 4.2 G/DL — SIGNIFICANT CHANGE UP (ref 3.3–5.2)
ALP SERPL-CCNC: 61 U/L — SIGNIFICANT CHANGE UP (ref 40–120)
ALT FLD-CCNC: 16 U/L — SIGNIFICANT CHANGE UP
ANION GAP SERPL CALC-SCNC: 12 MMOL/L — SIGNIFICANT CHANGE UP (ref 5–17)
APTT BLD: 34.5 SEC — SIGNIFICANT CHANGE UP (ref 27.5–35.5)
AST SERPL-CCNC: 15 U/L — SIGNIFICANT CHANGE UP
BASOPHILS # BLD AUTO: 0.04 K/UL — SIGNIFICANT CHANGE UP (ref 0–0.2)
BASOPHILS NFR BLD AUTO: 0.6 % — SIGNIFICANT CHANGE UP (ref 0–2)
BILIRUB SERPL-MCNC: 1.1 MG/DL — SIGNIFICANT CHANGE UP (ref 0.4–2)
BLD GP AB SCN SERPL QL: SIGNIFICANT CHANGE UP
BUN SERPL-MCNC: 30.2 MG/DL — HIGH (ref 8–20)
CALCIUM SERPL-MCNC: 9.1 MG/DL — SIGNIFICANT CHANGE UP (ref 8.4–10.5)
CHLORIDE SERPL-SCNC: 103 MMOL/L — SIGNIFICANT CHANGE UP (ref 96–108)
CO2 SERPL-SCNC: 27 MMOL/L — SIGNIFICANT CHANGE UP (ref 22–29)
CREAT SERPL-MCNC: 1.05 MG/DL — SIGNIFICANT CHANGE UP (ref 0.5–1.3)
EGFR: 50 ML/MIN/1.73M2 — LOW
EOSINOPHIL # BLD AUTO: 0.02 K/UL — SIGNIFICANT CHANGE UP (ref 0–0.5)
EOSINOPHIL NFR BLD AUTO: 0.3 % — SIGNIFICANT CHANGE UP (ref 0–6)
GLUCOSE SERPL-MCNC: 127 MG/DL — HIGH (ref 70–99)
HCT VFR BLD CALC: 41.4 % — SIGNIFICANT CHANGE UP (ref 34.5–45)
HGB BLD-MCNC: 14 G/DL — SIGNIFICANT CHANGE UP (ref 11.5–15.5)
IMM GRANULOCYTES NFR BLD AUTO: 0.8 % — SIGNIFICANT CHANGE UP (ref 0–0.9)
INR BLD: 1.41 RATIO — HIGH (ref 0.88–1.16)
LYMPHOCYTES # BLD AUTO: 1.3 K/UL — SIGNIFICANT CHANGE UP (ref 1–3.3)
LYMPHOCYTES # BLD AUTO: 19.5 % — SIGNIFICANT CHANGE UP (ref 13–44)
MAGNESIUM SERPL-MCNC: 2 MG/DL — SIGNIFICANT CHANGE UP (ref 1.6–2.6)
MCHC RBC-ENTMCNC: 29.2 PG — SIGNIFICANT CHANGE UP (ref 27–34)
MCHC RBC-ENTMCNC: 33.8 GM/DL — SIGNIFICANT CHANGE UP (ref 32–36)
MCV RBC AUTO: 86.3 FL — SIGNIFICANT CHANGE UP (ref 80–100)
MONOCYTES # BLD AUTO: 0.82 K/UL — SIGNIFICANT CHANGE UP (ref 0–0.9)
MONOCYTES NFR BLD AUTO: 12.3 % — SIGNIFICANT CHANGE UP (ref 2–14)
NEUTROPHILS # BLD AUTO: 4.42 K/UL — SIGNIFICANT CHANGE UP (ref 1.8–7.4)
NEUTROPHILS NFR BLD AUTO: 66.5 % — SIGNIFICANT CHANGE UP (ref 43–77)
PLATELET # BLD AUTO: 147 K/UL — LOW (ref 150–400)
POTASSIUM SERPL-MCNC: 4.4 MMOL/L — SIGNIFICANT CHANGE UP (ref 3.5–5.3)
POTASSIUM SERPL-SCNC: 4.4 MMOL/L — SIGNIFICANT CHANGE UP (ref 3.5–5.3)
PROT SERPL-MCNC: 6.3 G/DL — LOW (ref 6.6–8.7)
PROTHROM AB SERPL-ACNC: 16.4 SEC — HIGH (ref 10.5–13.4)
RBC # BLD: 4.8 M/UL — SIGNIFICANT CHANGE UP (ref 3.8–5.2)
RBC # FLD: 15.8 % — HIGH (ref 10.3–14.5)
SODIUM SERPL-SCNC: 142 MMOL/L — SIGNIFICANT CHANGE UP (ref 135–145)
TSH SERPL-MCNC: 1.47 UIU/ML — SIGNIFICANT CHANGE UP (ref 0.27–4.2)
WBC # BLD: 6.65 K/UL — SIGNIFICANT CHANGE UP (ref 3.8–10.5)
WBC # FLD AUTO: 6.65 K/UL — SIGNIFICANT CHANGE UP (ref 3.8–10.5)

## 2023-07-05 PROCEDURE — 70450 CT HEAD/BRAIN W/O DYE: CPT | Mod: MA

## 2023-07-05 PROCEDURE — 72125 CT NECK SPINE W/O DYE: CPT | Mod: MA

## 2023-07-05 PROCEDURE — 96375 TX/PRO/DX INJ NEW DRUG ADDON: CPT

## 2023-07-05 PROCEDURE — 71045 X-RAY EXAM CHEST 1 VIEW: CPT

## 2023-07-05 PROCEDURE — 85025 COMPLETE CBC W/AUTO DIFF WBC: CPT

## 2023-07-05 PROCEDURE — 86900 BLOOD TYPING SEROLOGIC ABO: CPT

## 2023-07-05 PROCEDURE — 36415 COLL VENOUS BLD VENIPUNCTURE: CPT

## 2023-07-05 PROCEDURE — 96361 HYDRATE IV INFUSION ADD-ON: CPT

## 2023-07-05 PROCEDURE — 86901 BLOOD TYPING SEROLOGIC RH(D): CPT

## 2023-07-05 PROCEDURE — 70450 CT HEAD/BRAIN W/O DYE: CPT | Mod: 26,MA

## 2023-07-05 PROCEDURE — 84443 ASSAY THYROID STIM HORMONE: CPT

## 2023-07-05 PROCEDURE — 80053 COMPREHEN METABOLIC PANEL: CPT

## 2023-07-05 PROCEDURE — 84484 ASSAY OF TROPONIN QUANT: CPT

## 2023-07-05 PROCEDURE — 86850 RBC ANTIBODY SCREEN: CPT

## 2023-07-05 PROCEDURE — 99291 CRITICAL CARE FIRST HOUR: CPT

## 2023-07-05 PROCEDURE — 93010 ELECTROCARDIOGRAM REPORT: CPT

## 2023-07-05 PROCEDURE — 83735 ASSAY OF MAGNESIUM: CPT

## 2023-07-05 PROCEDURE — 71045 X-RAY EXAM CHEST 1 VIEW: CPT | Mod: 26

## 2023-07-05 PROCEDURE — 85730 THROMBOPLASTIN TIME PARTIAL: CPT

## 2023-07-05 PROCEDURE — 99285 EMERGENCY DEPT VISIT HI MDM: CPT | Mod: 25

## 2023-07-05 PROCEDURE — 96374 THER/PROPH/DIAG INJ IV PUSH: CPT

## 2023-07-05 PROCEDURE — 72125 CT NECK SPINE W/O DYE: CPT | Mod: 26,MA

## 2023-07-05 PROCEDURE — 85610 PROTHROMBIN TIME: CPT

## 2023-07-05 PROCEDURE — 93005 ELECTROCARDIOGRAM TRACING: CPT

## 2023-07-05 RX ORDER — ADENOSINE 3 MG/ML
6 INJECTION INTRAVENOUS ONCE
Refills: 0 | Status: COMPLETED | OUTPATIENT
Start: 2023-07-05 | End: 2023-07-05

## 2023-07-05 RX ORDER — METOPROLOL TARTRATE 50 MG
50 TABLET ORAL ONCE
Refills: 0 | Status: COMPLETED | OUTPATIENT
Start: 2023-07-05 | End: 2023-07-05

## 2023-07-05 RX ORDER — ATORVASTATIN CALCIUM 80 MG/1
1 TABLET, FILM COATED ORAL
Qty: 0 | Refills: 0 | DISCHARGE

## 2023-07-05 RX ORDER — METOPROLOL TARTRATE 50 MG
5 TABLET ORAL ONCE
Refills: 0 | Status: COMPLETED | OUTPATIENT
Start: 2023-07-05 | End: 2023-07-05

## 2023-07-05 RX ORDER — APIXABAN 2.5 MG/1
1 TABLET, FILM COATED ORAL
Refills: 0 | DISCHARGE

## 2023-07-05 RX ORDER — SODIUM CHLORIDE 9 MG/ML
500 INJECTION INTRAMUSCULAR; INTRAVENOUS; SUBCUTANEOUS ONCE
Refills: 0 | Status: COMPLETED | OUTPATIENT
Start: 2023-07-05 | End: 2023-07-05

## 2023-07-05 RX ORDER — AMIODARONE HYDROCHLORIDE 400 MG/1
150 TABLET ORAL ONCE
Refills: 0 | Status: COMPLETED | OUTPATIENT
Start: 2023-07-05 | End: 2023-07-05

## 2023-07-05 RX ORDER — ADENOSINE 3 MG/ML
12 INJECTION INTRAVENOUS ONCE
Refills: 0 | Status: COMPLETED | OUTPATIENT
Start: 2023-07-05 | End: 2023-07-05

## 2023-07-05 RX ORDER — DILTIAZEM HCL 120 MG
180 CAPSULE, EXT RELEASE 24 HR ORAL ONCE
Refills: 0 | Status: COMPLETED | OUTPATIENT
Start: 2023-07-05 | End: 2023-07-05

## 2023-07-05 RX ADMIN — Medication 5 MILLIGRAM(S): at 11:10

## 2023-07-05 RX ADMIN — ADENOSINE 12 MILLIGRAM(S): 3 INJECTION INTRAVENOUS at 11:40

## 2023-07-05 RX ADMIN — AMIODARONE HYDROCHLORIDE 150 MILLIGRAM(S): 400 TABLET ORAL at 12:20

## 2023-07-05 RX ADMIN — SODIUM CHLORIDE 500 MILLILITER(S): 9 INJECTION INTRAMUSCULAR; INTRAVENOUS; SUBCUTANEOUS at 14:43

## 2023-07-05 RX ADMIN — Medication 180 MILLIGRAM(S): at 15:08

## 2023-07-05 RX ADMIN — Medication 5 MILLIGRAM(S): at 11:00

## 2023-07-05 RX ADMIN — AMIODARONE HYDROCHLORIDE 618 MILLIGRAM(S): 400 TABLET ORAL at 12:10

## 2023-07-05 RX ADMIN — Medication 50 MILLIGRAM(S): at 19:26

## 2023-07-05 RX ADMIN — Medication 50 MILLIGRAM(S): at 13:39

## 2023-07-05 RX ADMIN — SODIUM CHLORIDE 500 MILLILITER(S): 9 INJECTION INTRAMUSCULAR; INTRAVENOUS; SUBCUTANEOUS at 16:27

## 2023-07-05 RX ADMIN — Medication 5 MILLIGRAM(S): at 10:55

## 2023-07-05 RX ADMIN — ADENOSINE 6 MILLIGRAM(S): 3 INJECTION INTRAVENOUS at 11:35

## 2023-07-05 NOTE — ED PROVIDER NOTE - CLINICAL SUMMARY MEDICAL DECISION MAKING FREE TEXT BOX
Pt presented after trip and fall, on DOAC noted frontal hematoma- neuro intact. did not tachy in 140s, A flutter, missed home medications. give home metop/dilt after amio with initial failed rate control with adenosine/IV metoprolol. HR improved. asymptomatic. no acute findings on CT imaging related to trauma. patient to Follow up with Dr. Jean

## 2023-07-05 NOTE — ED PROVIDER NOTE - NSFOLLOWUPINSTRUCTIONS_ED_ALL_ED_FT
1. Return to ED for worsening, progressive or any other concerning symptoms   2. Follow up with your primary care doctor in 2-3days   3. Follow up with Dr. Jean regarding your heart arrythmia   4. Follow up with opthalmology regarding the mass   5. Take Tylenol up to 975 mg every 6 hours as needed for pain.   6. Rest, apply ice over covered skin for no more than 15 minutes at a time- hematoma, bruising spreading down the face is normal and expected     Contusion    A contusion is a deep bruise. Contusions are the result of a blunt injury to tissues and muscle fibers under the skin. The skin overlying the contusion may turn blue, purple, or yellow. Symptoms also include pain and swelling in the injured area.    SEEK IMMEDIATE MEDICAL CARE IF YOU HAVE ANY OF THE FOLLOWING SYMPTOMS: severe pain, numbness, tingling, pain, weakness, or skin color/temperature change in any part of your body distal to the injury.     Palpitations    A palpitation is the feeling that your heartbeat is irregular or is faster than normal. It may feel like your heart is fluttering or skipping a beat. They may be caused by many things, including smoking, caffeine, alcohol, stress, and certain medicines. Although most causes of palpitations are not serious, palpitations can be a sign of a serious medical problem. Avoid caffeine, alcohol, and tobacco products at home. Try to reduce stress and anxiety and make sure to get plenty of rest.     SEEK IMMEDIATE MEDICAL CARE IF YOU HAVE ANY OF THE FOLLOWING SYMPTOMS: chest pain, shortness of breath, severe headache, dizziness/lightheadedness, or fainting.

## 2023-07-05 NOTE — ED ADULT TRIAGE NOTE - CHIEF COMPLAINT QUOTE
Pt awake, NAD. Pt BIBEMS from the [facility] with hematoma to forehead and bruising to b/l knees s/p trip and fall. On Eliquis, ?LOC. Breathing even and unlabored.

## 2023-07-05 NOTE — CHART NOTE - NSCHARTNOTEFT_GEN_A_CORE
SWNote: per EDMD(Dr Cantrell) pt ready to return to the \[facility]\"" , per dr Cantrell she spoke with [facility] staff and pt can return with d/c nothing else needed. [facility] transport called ([facility] arranged , [facility]/billing letter left with pot's RN. No other Sw needs reported at this moment.

## 2023-07-05 NOTE — ED ADULT NURSE NOTE - NSFALLHARMRISKINTERV_ED_ALL_ED

## 2023-07-05 NOTE — ED ADULT NURSE REASSESSMENT NOTE - NS ED NURSE REASSESS COMMENT FT1
Assumed care of pt at 19:15 as stated in report from MAXWELL Robertson. Charting as noted. Patient A&O x4, Denies any pain/discomfort, denies CP/SOB. Updated on the plan of care. Call bell within reach, bed locked in lowest position. NAD, pt safety maintained.

## 2023-07-05 NOTE — ED ADULT NURSE REASSESSMENT NOTE - NSFALLHARMRISKINTERV_ED_ALL_ED

## 2023-07-05 NOTE — ED ADULT NURSE REASSESSMENT NOTE - BEFAST SCREENING
Negative Is This A New Presentation, Or A Follow-Up?: Skin Lesions Have Your Skin Lesions Been Treated?: not been treated

## 2023-07-05 NOTE — ED ADULT NURSE NOTE - OBJECTIVE STATEMENT
Assumed care of pt at 1045 in CC. Pt A&Ox4 c/o s/p fall from the [facility]. Pt states " I waited 2 hours until someone came and found,". Pt denies LOC, pt denies nausea. Pt endorses to mild headache from fall, pt has hematoma on forehead. PMH afib on Eliquis, HTN and Dementia. CM initiated afib on tele, RR Even and unlabored. MD Cantrell at bedside assessing pt. Neuro intact + DHARA.

## 2023-07-05 NOTE — ED PROVIDER NOTE - OBJECTIVE STATEMENT
89yo F with a fib on eliquis, today had a trip and fall, denies any dizziness/lightheadness. since feels palpitations and mild SOB. no CP. no focal weakness. no LOC. no sensory changes. noted swelling to forehead where she hit her head. no change or missed medications. no recent illness. no fever/chills. no bleeding

## 2023-07-05 NOTE — ED ADULT NURSE REASSESSMENT NOTE - ED NEURO NIH ASSESS
Outpatient Physical Therapy Ortho Treatment Note   [place]     Patient Name: Andrea Rosales Jr.  : 1944  MRN: [id number]  Today's Date: 2019      Visit Date: 2019    Visit Dx:    ICD-10-CM ICD-9-CM   1. Acute pain of right shoulder M25.511 719.41       Patient Active Problem List   Diagnosis   • Hypertension   • Hyperlipidemia   • Osteoarthritis   • Palpitations   • Prediabetes   • Hypercholesterolemia   • Benign essential hypertension   • History of prostate cancer   • Annual physical exam   • Premature atrial contraction        Past Medical History:   Diagnosis Date   • Benign essential hypertension    • History of colonic polyps    • History of prostate cancer    • Hypercholesterolemia    • Prediabetes    • Prostate cancer (CMS/HCC)     S/P PROSTATECTOMY   • Right-sided back pain         Past Surgical History:   Procedure Laterality Date   • PROSTATECTOMY      Prostate cancer-Radical    • TONSILLECTOMY         PT Ortho     Row Name 19 1400       Shoulder Girdle Palpation    Shoulder Girdle Palpation?  No Abnormality/Tenderness  -CR       Right Upper Ext    Rt Shoulder Abduction AROM  165  -CR    Rt Shoulder Flexion AROM  165  -CR       MMT Right Upper Ext    Rt Shoulder Flexion MMT, Gross Movement  (5/5) normal  -CR    Rt Shoulder Extension MMT, Gross Movement  (5/5) normal  -CR    Rt Shoulder ABduction MMT, Gross Movement  (4+/5) good plus  -CR    Rt Shoulder Internal Rotation MMT, Gross Movement  (5/5) normal  -CR    Rt Shoulder External Rotation MMT, Gross Movement  (4+/5) good plus  -CR      User Key  (r) = Recorded By, (t) = Taken By, (c) = Cosigned By    Initials Name Provider Type    Sang Wynne, PT Physical Therapist                      PT Assessment/Plan     Row Name 19 4934          PT Assessment    Assessment Comments  At this time client has met all goals set at time of IE.  He additionally has returned to tennis play with minimal tightness  reported following game.  He will be following up with shoulder MD this thursday and will follow up with PT services re: level of care ongoing.  Therapist anticipates discharge appropriate at that time.    -CR        PT Plan    PT Plan Comments  Follow up with client following MD appointment.   -CR       User Key  (r) = Recorded By, (t) = Taken By, (c) = Cosigned By    Initials Name Provider Type    Sang Wynne, AKIL Physical Therapist            Exercises     Row Name 07/22/19 1400             Subjective Comments    Subjective Comments  Client reports he has returned to tennis   -CR         Subjective Pain    Able to rate subjective pain?  yes  -CR      Pre-Treatment Pain Level  0  -CR      Post-Treatment Pain Level  0  -CR         Total Minutes    63551 - PT Therapeutic Exercise Minutes  30  -CR         Exercise 3    Exercise Name 3  PNF flexion, abd, ER  -CR      Sets 3  3  -CR      Reps 3  10  -CR      Additional Comments  green  -CR         Exercise 4    Exercise Name 4  sleeper stretch  -CR      Reps 4  5  -CR      Time 4  10  -CR         Exercise 5    Exercise Name 5  UE ergometer  -CR      Time 5  6  -CR         Exercise 6    Exercise Name 6  hi 5 red band   -CR      Sets 6  2  -CR      Reps 6  15  -CR         Exercise 7    Exercise Name 7  low cleans  -CR      Sets 7  3  -CR      Reps 7  10  -CR         Exercise 8    Exercise Name 8  review of HEP  -CR        User Key  (r) = Recorded By, (t) = Taken By, (c) = Cosigned By    Initials Name Provider Type    Sang Wynne, PT Physical Therapist                       PT OP Goals     Row Name 07/22/19 1500          PT Short Term Goals    STG Date to Achieve  06/13/19  -CR     STG 1  client will demonstrate independence with initial HEP  -CR     STG 1 Progress  Met  -CR     STG 2  client will demonstrate pain free full AROM   -CR     STG 2 Progress  Met  -CR     STG 3  client will demonstrate 4+/5 strength in shoulder   -CR     STG 3 Progress   Met  -CR        Long Term Goals    LTG Date to Achieve  07/11/19  -CR     LTG 1  client will be independent with comprehensive HEP for self management   -CR     LTG 1 Progress  Met  -CR     LTG 2  client report qDASH limited < 20%  -CR     LTG 2 Progress  Met  -CR     LTG 3  Client will demonstrate dereased TTP along LHBT  -CR     LTG 3 Progress  Ongoing;Met  -CR       User Key  (r) = Recorded By, (t) = Taken By, (c) = Cosigned By    Initials Name Provider Type    CR Sang Riley, PT Physical Therapist          Therapy Education  Education Details: HEP provided including sleeper stretch, crossbody adduction, PNF pattern, ER band, and pull aparts              Time Calculation:   Start Time: 1430  Therapy Charges for Today     Code Description Service Date Service Provider Modifiers Qty    [id number] HC PT THER PROC EA 15 MIN 7/22/2019 Sang Riley, PT GP 2                    Sang Riley, PT  7/22/2019      baseline

## 2023-07-05 NOTE — ED PROVIDER NOTE - PHYSICAL EXAMINATION
Gen: NAD, AOx3  Head: NC, +large frontal hematoma   HEENT: EOMI, oral mucosa moist, normal conjunctiva, neck supple  Lung: CTAB, no respiratory distress  CV: tachy regular, no murmur, Normal perfusion  Abd: soft, NTND  MSK: No edema, no visible deformities  Neuro: No focal neurologic deficits  Skin: No rash   Psych: normal affect

## 2023-07-05 NOTE — ED PROVIDER NOTE - PATIENT PORTAL LINK FT
You can access the FollowMyHealth Patient Portal offered by [facility] by registering at the following website: [email or web address]. By joining [employer]’s FollowMyHealth portal, you will also be able to view your health information using other applications (apps) compatible with our system.

## 2023-07-05 NOTE — ED PROVIDER NOTE - PROGRESS NOTE DETAILS
initial EKG with SVT more likely a flutter due to history given metoprolol without improvement. given adenosine which slowed rate to view a flutter rhythm and HR back to 130s, given amio with improvements. cards consulted. patient missed morning medications, HR improved with home meds and amio. Has known A fib, will have Follow up outpt -Tamia DO

## 2023-07-06 ENCOUNTER — APPOINTMENT (OUTPATIENT)
Dept: OPHTHALMOLOGY | Facility: CLINIC | Age: 88
End: 2023-07-06

## 2023-08-17 ENCOUNTER — NON-APPOINTMENT (OUTPATIENT)
Age: 88
End: 2023-08-17

## 2023-08-17 ENCOUNTER — APPOINTMENT (OUTPATIENT)
Dept: OPHTHALMOLOGY | Facility: CLINIC | Age: 88
End: 2023-08-17
Payer: MEDICARE

## 2023-08-17 PROCEDURE — 92134 CPTRZ OPH DX IMG PST SGM RTA: CPT

## 2023-08-17 PROCEDURE — 92014 COMPRE OPH EXAM EST PT 1/>: CPT

## 2023-09-28 ENCOUNTER — APPOINTMENT (OUTPATIENT)
Dept: OPHTHALMOLOGY | Facility: CLINIC | Age: 88
End: 2023-09-28
Payer: MEDICARE

## 2023-09-28 ENCOUNTER — NON-APPOINTMENT (OUTPATIENT)
Age: 88
End: 2023-09-28

## 2023-09-28 ENCOUNTER — EMERGENCY (EMERGENCY)
Facility: HOSPITAL | Age: 88
LOS: 1 days | Discharge: HOME | End: 2023-09-28
Attending: EMERGENCY MEDICINE
Payer: MEDICARE

## 2023-09-28 VITALS
OXYGEN SATURATION: 94 % | HEART RATE: 67 BPM | SYSTOLIC BLOOD PRESSURE: 136 MMHG | TEMPERATURE: 98 F | RESPIRATION RATE: 18 BRPM | DIASTOLIC BLOOD PRESSURE: 83 MMHG

## 2023-09-28 DIAGNOSIS — Z90.12 ACQUIRED ABSENCE OF LEFT BREAST AND NIPPLE: Chronic | ICD-10-CM

## 2023-09-28 PROCEDURE — 99284 EMERGENCY DEPT VISIT MOD MDM: CPT

## 2023-09-28 PROCEDURE — 99284 EMERGENCY DEPT VISIT MOD MDM: CPT | Mod: 25

## 2023-09-28 PROCEDURE — 67028 INJECTION EYE DRUG: CPT | Mod: LT

## 2023-09-28 PROCEDURE — 92134 CPTRZ OPH DX IMG PST SGM RTA: CPT

## 2023-09-28 PROCEDURE — 72125 CT NECK SPINE W/O DYE: CPT | Mod: 26,MA

## 2023-09-28 PROCEDURE — 72125 CT NECK SPINE W/O DYE: CPT | Mod: MA

## 2023-09-28 PROCEDURE — 70450 CT HEAD/BRAIN W/O DYE: CPT | Mod: MA

## 2023-09-28 PROCEDURE — 70450 CT HEAD/BRAIN W/O DYE: CPT | Mod: 26,MA

## 2023-09-28 NOTE — ED PROVIDER NOTE - NSFOLLOWUPINSTRUCTIONS_ED_ALL_ED_FT
continue present medications  follow up with doctor in 3 - 5 days  return to ed with worsening headache, nausea, vomiting; or altered mental status

## 2023-09-28 NOTE — ED PROVIDER NOTE - OBJECTIVE STATEMENT
89 yo female pmh cardiac valve replacement on eliquis s/p fall while walking from doctor's office; pt hit the back of her head; pt denies loss of consciousness, neck pain, chest pain, abdominal pain or back pain;

## 2023-09-28 NOTE — ED ADULT NURSE NOTE - OBJECTIVE STATEMENT
Pt received A&Ox4 s/p priority CT for mechanical slip and fall at optometry office. Pt endorses hitting head, -LOC. On Eliquis for afib. TENORIO x2 4 with purpose. Respirations even & unlabored. NAD. Pt and son made aware of plan of care and verbalized understanding.

## 2023-09-28 NOTE — ED PROVIDER NOTE - CROS ED NEURO ALL NEG
[Clear] : left tympanic membrane clear [Erythema] : erythema [NL] : warm [Clear to Ausculatation Bilaterally] : clear to auscultation bilaterally [Congestion] : congestion [FreeTextEntry3] : injected and  erythema of rt ear drum, non bulging [FreeTextEntry4] : mild congestion negative...

## 2023-09-28 NOTE — ED ADULT NURSE NOTE - NSFALLHARMRISKINTERV_ED_ALL_ED

## 2023-09-28 NOTE — ED PROVIDER NOTE - PATIENT PORTAL LINK FT
You can access the FollowMyHealth Patient Portal offered by [facility] by registering at the following website: [email or web address]. By joining [employer]’s FollowMyHealth portal, you will also be able to view your health information using other applications (apps) compatible with our system.

## 2023-11-02 ENCOUNTER — APPOINTMENT (OUTPATIENT)
Dept: OPHTHALMOLOGY | Facility: CLINIC | Age: 88
End: 2023-11-02
Payer: MEDICARE

## 2023-11-02 ENCOUNTER — NON-APPOINTMENT (OUTPATIENT)
Age: 88
End: 2023-11-02

## 2023-11-02 PROCEDURE — 67028 INJECTION EYE DRUG: CPT | Mod: LT

## 2023-11-02 PROCEDURE — 92134 CPTRZ OPH DX IMG PST SGM RTA: CPT

## 2023-11-29 ENCOUNTER — INPATIENT (INPATIENT)
Facility: HOSPITAL | Age: 88
LOS: 0 days | Discharge: HOME | DRG: 92 | End: 2023-11-30
Attending: STUDENT IN AN ORGANIZED HEALTH CARE EDUCATION/TRAINING PROGRAM | Admitting: STUDENT IN AN ORGANIZED HEALTH CARE EDUCATION/TRAINING PROGRAM
Payer: MEDICARE

## 2023-11-29 VITALS
WEIGHT: 140.43 LBS | HEART RATE: 136 BPM | SYSTOLIC BLOOD PRESSURE: 139 MMHG | TEMPERATURE: 98 F | DIASTOLIC BLOOD PRESSURE: 90 MMHG | RESPIRATION RATE: 18 BRPM | OXYGEN SATURATION: 98 %

## 2023-11-29 DIAGNOSIS — Z90.12 ACQUIRED ABSENCE OF LEFT BREAST AND NIPPLE: Chronic | ICD-10-CM

## 2023-11-29 DIAGNOSIS — I63.9 CEREBRAL INFARCTION, UNSPECIFIED: ICD-10-CM

## 2023-11-29 LAB
ALBUMIN SERPL ELPH-MCNC: 4.1 G/DL — SIGNIFICANT CHANGE UP (ref 3.3–5.2)
ALBUMIN SERPL ELPH-MCNC: 4.1 G/DL — SIGNIFICANT CHANGE UP (ref 3.3–5.2)
ALP SERPL-CCNC: 79 U/L — SIGNIFICANT CHANGE UP (ref 40–120)
ALP SERPL-CCNC: 79 U/L — SIGNIFICANT CHANGE UP (ref 40–120)
ALT FLD-CCNC: 8 U/L — SIGNIFICANT CHANGE UP
ALT FLD-CCNC: 8 U/L — SIGNIFICANT CHANGE UP
ANION GAP SERPL CALC-SCNC: 12 MMOL/L — SIGNIFICANT CHANGE UP (ref 5–17)
ANION GAP SERPL CALC-SCNC: 12 MMOL/L — SIGNIFICANT CHANGE UP (ref 5–17)
APTT BLD: 38.3 SEC — HIGH (ref 24.5–35.6)
APTT BLD: 38.3 SEC — HIGH (ref 24.5–35.6)
AST SERPL-CCNC: 16 U/L — SIGNIFICANT CHANGE UP
AST SERPL-CCNC: 16 U/L — SIGNIFICANT CHANGE UP
BASOPHILS # BLD AUTO: 0.03 K/UL — SIGNIFICANT CHANGE UP (ref 0–0.2)
BASOPHILS # BLD AUTO: 0.03 K/UL — SIGNIFICANT CHANGE UP (ref 0–0.2)
BASOPHILS NFR BLD AUTO: 0.5 % — SIGNIFICANT CHANGE UP (ref 0–2)
BASOPHILS NFR BLD AUTO: 0.5 % — SIGNIFICANT CHANGE UP (ref 0–2)
BILIRUB SERPL-MCNC: 1.2 MG/DL — SIGNIFICANT CHANGE UP (ref 0.4–2)
BILIRUB SERPL-MCNC: 1.2 MG/DL — SIGNIFICANT CHANGE UP (ref 0.4–2)
BUN SERPL-MCNC: 33 MG/DL — HIGH (ref 8–20)
BUN SERPL-MCNC: 33 MG/DL — HIGH (ref 8–20)
CALCIUM SERPL-MCNC: 8.9 MG/DL — SIGNIFICANT CHANGE UP (ref 8.4–10.5)
CALCIUM SERPL-MCNC: 8.9 MG/DL — SIGNIFICANT CHANGE UP (ref 8.4–10.5)
CHLORIDE SERPL-SCNC: 103 MMOL/L — SIGNIFICANT CHANGE UP (ref 96–108)
CHLORIDE SERPL-SCNC: 103 MMOL/L — SIGNIFICANT CHANGE UP (ref 96–108)
CK SERPL-CCNC: 47 U/L — SIGNIFICANT CHANGE UP (ref 25–170)
CK SERPL-CCNC: 47 U/L — SIGNIFICANT CHANGE UP (ref 25–170)
CO2 SERPL-SCNC: 25 MMOL/L — SIGNIFICANT CHANGE UP (ref 22–29)
CO2 SERPL-SCNC: 25 MMOL/L — SIGNIFICANT CHANGE UP (ref 22–29)
CREAT SERPL-MCNC: 1 MG/DL — SIGNIFICANT CHANGE UP (ref 0.5–1.3)
CREAT SERPL-MCNC: 1 MG/DL — SIGNIFICANT CHANGE UP (ref 0.5–1.3)
EGFR: 53 ML/MIN/1.73M2 — LOW
EGFR: 53 ML/MIN/1.73M2 — LOW
EOSINOPHIL # BLD AUTO: 0.02 K/UL — SIGNIFICANT CHANGE UP (ref 0–0.5)
EOSINOPHIL # BLD AUTO: 0.02 K/UL — SIGNIFICANT CHANGE UP (ref 0–0.5)
EOSINOPHIL NFR BLD AUTO: 0.3 % — SIGNIFICANT CHANGE UP (ref 0–6)
EOSINOPHIL NFR BLD AUTO: 0.3 % — SIGNIFICANT CHANGE UP (ref 0–6)
GLUCOSE SERPL-MCNC: 100 MG/DL — HIGH (ref 70–99)
GLUCOSE SERPL-MCNC: 100 MG/DL — HIGH (ref 70–99)
HCT VFR BLD CALC: 35.5 % — SIGNIFICANT CHANGE UP (ref 34.5–45)
HCT VFR BLD CALC: 35.5 % — SIGNIFICANT CHANGE UP (ref 34.5–45)
HGB BLD-MCNC: 12.1 G/DL — SIGNIFICANT CHANGE UP (ref 11.5–15.5)
HGB BLD-MCNC: 12.1 G/DL — SIGNIFICANT CHANGE UP (ref 11.5–15.5)
IMM GRANULOCYTES NFR BLD AUTO: 0.6 % — SIGNIFICANT CHANGE UP (ref 0–0.9)
IMM GRANULOCYTES NFR BLD AUTO: 0.6 % — SIGNIFICANT CHANGE UP (ref 0–0.9)
INR BLD: 1.7 RATIO — HIGH (ref 0.85–1.18)
INR BLD: 1.7 RATIO — HIGH (ref 0.85–1.18)
LYMPHOCYTES # BLD AUTO: 1.59 K/UL — SIGNIFICANT CHANGE UP (ref 1–3.3)
LYMPHOCYTES # BLD AUTO: 1.59 K/UL — SIGNIFICANT CHANGE UP (ref 1–3.3)
LYMPHOCYTES # BLD AUTO: 24.4 % — SIGNIFICANT CHANGE UP (ref 13–44)
LYMPHOCYTES # BLD AUTO: 24.4 % — SIGNIFICANT CHANGE UP (ref 13–44)
MCHC RBC-ENTMCNC: 30 PG — SIGNIFICANT CHANGE UP (ref 27–34)
MCHC RBC-ENTMCNC: 30 PG — SIGNIFICANT CHANGE UP (ref 27–34)
MCHC RBC-ENTMCNC: 34.1 GM/DL — SIGNIFICANT CHANGE UP (ref 32–36)
MCHC RBC-ENTMCNC: 34.1 GM/DL — SIGNIFICANT CHANGE UP (ref 32–36)
MCV RBC AUTO: 88.1 FL — SIGNIFICANT CHANGE UP (ref 80–100)
MCV RBC AUTO: 88.1 FL — SIGNIFICANT CHANGE UP (ref 80–100)
MONOCYTES # BLD AUTO: 0.87 K/UL — SIGNIFICANT CHANGE UP (ref 0–0.9)
MONOCYTES # BLD AUTO: 0.87 K/UL — SIGNIFICANT CHANGE UP (ref 0–0.9)
MONOCYTES NFR BLD AUTO: 13.3 % — SIGNIFICANT CHANGE UP (ref 2–14)
MONOCYTES NFR BLD AUTO: 13.3 % — SIGNIFICANT CHANGE UP (ref 2–14)
NEUTROPHILS # BLD AUTO: 3.97 K/UL — SIGNIFICANT CHANGE UP (ref 1.8–7.4)
NEUTROPHILS # BLD AUTO: 3.97 K/UL — SIGNIFICANT CHANGE UP (ref 1.8–7.4)
NEUTROPHILS NFR BLD AUTO: 60.9 % — SIGNIFICANT CHANGE UP (ref 43–77)
NEUTROPHILS NFR BLD AUTO: 60.9 % — SIGNIFICANT CHANGE UP (ref 43–77)
PLATELET # BLD AUTO: 141 K/UL — LOW (ref 150–400)
PLATELET # BLD AUTO: 141 K/UL — LOW (ref 150–400)
POTASSIUM SERPL-MCNC: 4.9 MMOL/L — SIGNIFICANT CHANGE UP (ref 3.5–5.3)
POTASSIUM SERPL-MCNC: 4.9 MMOL/L — SIGNIFICANT CHANGE UP (ref 3.5–5.3)
POTASSIUM SERPL-SCNC: 4.9 MMOL/L — SIGNIFICANT CHANGE UP (ref 3.5–5.3)
POTASSIUM SERPL-SCNC: 4.9 MMOL/L — SIGNIFICANT CHANGE UP (ref 3.5–5.3)
PROT SERPL-MCNC: 6.5 G/DL — LOW (ref 6.6–8.7)
PROT SERPL-MCNC: 6.5 G/DL — LOW (ref 6.6–8.7)
PROTHROM AB SERPL-ACNC: 18.6 SEC — HIGH (ref 9.5–13)
PROTHROM AB SERPL-ACNC: 18.6 SEC — HIGH (ref 9.5–13)
RBC # BLD: 4.03 M/UL — SIGNIFICANT CHANGE UP (ref 3.8–5.2)
RBC # BLD: 4.03 M/UL — SIGNIFICANT CHANGE UP (ref 3.8–5.2)
RBC # FLD: 14.9 % — HIGH (ref 10.3–14.5)
RBC # FLD: 14.9 % — HIGH (ref 10.3–14.5)
SODIUM SERPL-SCNC: 140 MMOL/L — SIGNIFICANT CHANGE UP (ref 135–145)
SODIUM SERPL-SCNC: 140 MMOL/L — SIGNIFICANT CHANGE UP (ref 135–145)
TROPONIN T, HIGH SENSITIVITY RESULT: 16 NG/L — SIGNIFICANT CHANGE UP (ref 0–51)
TROPONIN T, HIGH SENSITIVITY RESULT: 16 NG/L — SIGNIFICANT CHANGE UP (ref 0–51)
WBC # BLD: 6.52 K/UL — SIGNIFICANT CHANGE UP (ref 3.8–10.5)
WBC # BLD: 6.52 K/UL — SIGNIFICANT CHANGE UP (ref 3.8–10.5)
WBC # FLD AUTO: 6.52 K/UL — SIGNIFICANT CHANGE UP (ref 3.8–10.5)
WBC # FLD AUTO: 6.52 K/UL — SIGNIFICANT CHANGE UP (ref 3.8–10.5)

## 2023-11-29 PROCEDURE — 70496 CT ANGIOGRAPHY HEAD: CPT | Mod: 26,MA

## 2023-11-29 PROCEDURE — 99285 EMERGENCY DEPT VISIT HI MDM: CPT

## 2023-11-29 PROCEDURE — 99223 1ST HOSP IP/OBS HIGH 75: CPT

## 2023-11-29 PROCEDURE — 0042T: CPT | Mod: MA

## 2023-11-29 PROCEDURE — 70450 CT HEAD/BRAIN W/O DYE: CPT | Mod: 26,MA,XU

## 2023-11-29 PROCEDURE — 93010 ELECTROCARDIOGRAM REPORT: CPT

## 2023-11-29 PROCEDURE — 71045 X-RAY EXAM CHEST 1 VIEW: CPT | Mod: 26

## 2023-11-29 PROCEDURE — 73090 X-RAY EXAM OF FOREARM: CPT | Mod: 26,LT

## 2023-11-29 PROCEDURE — 73070 X-RAY EXAM OF ELBOW: CPT | Mod: 26,LT

## 2023-11-29 PROCEDURE — 70498 CT ANGIOGRAPHY NECK: CPT | Mod: 26,MA

## 2023-11-29 RX ORDER — APIXABAN 2.5 MG/1
1 TABLET, FILM COATED ORAL
Refills: 0 | DISCHARGE

## 2023-11-29 RX ORDER — DILTIAZEM HCL 120 MG
1 CAPSULE, EXT RELEASE 24 HR ORAL
Refills: 0 | DISCHARGE

## 2023-11-29 RX ORDER — ASPIRIN/CALCIUM CARB/MAGNESIUM 324 MG
81 TABLET ORAL DAILY
Refills: 0 | Status: DISCONTINUED | OUTPATIENT
Start: 2023-11-29 | End: 2023-11-30

## 2023-11-29 RX ORDER — INFLUENZA VIRUS VACCINE 15; 15; 15; 15 UG/.5ML; UG/.5ML; UG/.5ML; UG/.5ML
0.7 SUSPENSION INTRAMUSCULAR ONCE
Refills: 0 | Status: DISCONTINUED | OUTPATIENT
Start: 2023-11-29 | End: 2023-11-30

## 2023-11-29 RX ORDER — ONDANSETRON 8 MG/1
4 TABLET, FILM COATED ORAL EVERY 8 HOURS
Refills: 0 | Status: DISCONTINUED | OUTPATIENT
Start: 2023-11-29 | End: 2023-11-30

## 2023-11-29 RX ORDER — METOPROLOL TARTRATE 50 MG
50 TABLET ORAL
Refills: 0 | Status: DISCONTINUED | OUTPATIENT
Start: 2023-11-29 | End: 2023-11-30

## 2023-11-29 RX ORDER — IPRATROPIUM BROMIDE 21 MCG
2 AEROSOL, SPRAY (ML) NASAL
Refills: 0 | DISCHARGE

## 2023-11-29 RX ORDER — ATORVASTATIN CALCIUM 80 MG/1
1 TABLET, FILM COATED ORAL
Refills: 0 | DISCHARGE

## 2023-11-29 RX ORDER — ATORVASTATIN CALCIUM 80 MG/1
80 TABLET, FILM COATED ORAL AT BEDTIME
Refills: 0 | Status: DISCONTINUED | OUTPATIENT
Start: 2023-11-29 | End: 2023-11-30

## 2023-11-29 RX ORDER — FUROSEMIDE 40 MG
0.5 TABLET ORAL
Refills: 0 | DISCHARGE

## 2023-11-29 RX ORDER — PREGABALIN 225 MG/1
1 CAPSULE ORAL
Refills: 0 | DISCHARGE

## 2023-11-29 RX ORDER — POTASSIUM CHLORIDE 20 MEQ
1 PACKET (EA) ORAL
Refills: 0 | DISCHARGE

## 2023-11-29 RX ORDER — APIXABAN 2.5 MG/1
2.5 TABLET, FILM COATED ORAL
Refills: 0 | Status: DISCONTINUED | OUTPATIENT
Start: 2023-11-29 | End: 2023-11-30

## 2023-11-29 RX ORDER — TRAMADOL HYDROCHLORIDE 50 MG/1
1 TABLET ORAL
Refills: 0 | DISCHARGE

## 2023-11-29 RX ORDER — ACETAMINOPHEN 500 MG
650 TABLET ORAL EVERY 6 HOURS
Refills: 0 | Status: DISCONTINUED | OUTPATIENT
Start: 2023-11-29 | End: 2023-11-30

## 2023-11-29 RX ORDER — PREGABALIN 225 MG/1
1000 CAPSULE ORAL DAILY
Refills: 0 | Status: DISCONTINUED | OUTPATIENT
Start: 2023-11-29 | End: 2023-11-30

## 2023-11-29 RX ORDER — METOPROLOL TARTRATE 50 MG
1 TABLET ORAL
Refills: 0 | DISCHARGE

## 2023-11-29 RX ORDER — LANOLIN ALCOHOL/MO/W.PET/CERES
3 CREAM (GRAM) TOPICAL AT BEDTIME
Refills: 0 | Status: DISCONTINUED | OUTPATIENT
Start: 2023-11-29 | End: 2023-11-30

## 2023-11-29 RX ORDER — IPRATROPIUM BROMIDE 21 MCG
1 AEROSOL, SPRAY (ML) NASAL
Refills: 0 | DISCHARGE

## 2023-11-29 RX ORDER — DILTIAZEM HCL 120 MG
240 CAPSULE, EXT RELEASE 24 HR ORAL DAILY
Refills: 0 | Status: DISCONTINUED | OUTPATIENT
Start: 2023-11-29 | End: 2023-11-30

## 2023-11-29 RX ORDER — METOPROLOL TARTRATE 50 MG
5 TABLET ORAL EVERY 6 HOURS
Refills: 0 | Status: DISCONTINUED | OUTPATIENT
Start: 2023-11-29 | End: 2023-11-30

## 2023-11-29 RX ORDER — ACETAMINOPHEN 500 MG
650 TABLET ORAL ONCE
Refills: 0 | Status: COMPLETED | OUTPATIENT
Start: 2023-11-29 | End: 2023-11-29

## 2023-11-29 RX ADMIN — Medication 650 MILLIGRAM(S): at 15:16

## 2023-11-29 RX ADMIN — Medication 650 MILLIGRAM(S): at 16:00

## 2023-11-29 RX ADMIN — ATORVASTATIN CALCIUM 80 MILLIGRAM(S): 80 TABLET, FILM COATED ORAL at 21:35

## 2023-11-29 RX ADMIN — APIXABAN 2.5 MILLIGRAM(S): 2.5 TABLET, FILM COATED ORAL at 18:06

## 2023-11-29 RX ADMIN — Medication 50 MILLIGRAM(S): at 18:06

## 2023-11-29 NOTE — ED PROVIDER NOTE - MUSCULOSKELETAL MINIMAL EXAM
Tenderness, swelling on L elbow. No ttp on other joints including bilateral shoulders, hips, wrists, knees or ankles. All joints except L elbow are painless and full ROM.

## 2023-11-29 NOTE — ED PROVIDER NOTE - CLINICAL SUMMARY MEDICAL DECISION MAKING FREE TEXT BOX
A 89 yo F with pmh of Afib, HTN, HLD, was sent by MD c/o bruising on LUE and L facial drop. Pt's son is at bedside. Pt is living in [facility]. Per son, Pt fell from the chair on between 11/17 - 11/23, as Pt told her daughter on TG day that Pt fell on some day. Per son, Pt was OK when son met her on 11/17. Per son, Pt had no facial drop when he ate dinner with Pt last night at 5 pm. This morning, son brought her to MD when MD noticed her L facial drop and recommended to visit the ED. Now Pt is comfortably lying on bed with L facial drop and bruising on LUE. AAOx3, no dysarthria. Pt is on Eliquis.  Per protocol, Code stroke activated. CTs show negative for acute stroke. Will check lab and reassess.

## 2023-11-29 NOTE — H&P ADULT - HISTORY OF PRESENT ILLNESS
A 89 yo F with pmh of Afib on Eliquis, HTN, HLD, chronic CHF was sent by MD c/o bruising on LUE and L facial drop. Per patient, she was doing well and did not see any asymmetry in her face. Went to see her PCP and PCP's PA earlier today and was noted with left sided facial droop and came to the ED for further care. Denies vision changes, no prior TIA/CVA, weakness. Of note, had a fall and landed on her left elbow a week ago, noted with bruising with no limitations in motions.   ED vitals and labs stable, CTH and angio negative for acute pathologies, admitted for CVA/TIA workup.

## 2023-11-29 NOTE — H&P ADULT - NSHPREVIEWOFSYSTEMS_GEN_ALL_CORE
REVIEW OF SYSTEMS:    CONSTITUTIONAL: No weakness, fevers or chills  EYES: No vertigo or throat pain  ENT: No visual changes, eye pain  MOUTH: moist mariya mucosal, no mouth ulcers  NECK: No pain or stiffness  RESPIRATORY: No cough, wheezing, hemoptysis; No shortness of breath  CARDIOVASCULAR: No chest pain or palpitations  GASTROINTESTINAL: No abdominal or epigastric pain. No nausea, vomiting, or hematemesis; No diarrhea or constipation. No melena or hematochezia.  GENITOURINARY: No dysuria, frequency or hematuria  NEUROLOGICAL: No numbness or weakness. +Left facial droop  SKIN: No itching, rashes  PSYCH: No anxiety or depression

## 2023-11-29 NOTE — ED ADULT NURSE NOTE - OBJECTIVE STATEMENT
Pt brought in by EMS as activated code anastasiya. son at bedside states he was with her yesterday for dinner and did not notice a facial droop. Pt resides at [facility] and son went today to pick her up for doc appt r/t L arm bruising and swelling and noticed facial droop. pt a&o4 able to answer all questions.

## 2023-11-29 NOTE — ED ADULT TRIAGE NOTE - CHIEF COMPLAINT QUOTE
fell 1-2 weeks ago is on Eliquis. bruising to left hand and arm. has facial droop to left side, unknown last known well. son has not seen her in 1 week, did not let the arbors know that she fell so only sought out medical treatment today. sent in by md for concern for stroke. gets a little confused from time to time but is at baseline.

## 2023-11-29 NOTE — ED PROVIDER NOTE - OBJECTIVE STATEMENT
A 89 yo F with pmh of Afib, HTN, HLD, was sent by MD c/o bruising on LUE and L facial drop. Pt's son is at bedside. Pt is living in [facility]. Per son, Pt fell from the chair on between 11/17 - 11/23, as Pt told her daughter on TG day that Pt fell on some day. Per son, Pt was OK when son met her on 11/17. Per son, Pt had no facial drop when he ate dinner with Pt last night at 5 pm. This morning, son brought her to MD when MD noticed her L facial drop and recommended to visit the ED. Now Pt is comfortably lying on bed with L facial drop and bruising on LUE. AAOx3, no dysarthria. Pt is on Eliquis.

## 2023-11-29 NOTE — H&P ADULT - NSHPLABSRESULTS_GEN_ALL_CORE
12.1   6.52  )-----------( 141      ( 29 Nov 2023 14:11 )             35.5       11-29    140  |  103  |  33.0<H>  ----------------------------<  100<H>  4.9   |  25.0  |  1.00    Ca    8.9      29 Nov 2023 14:11    TPro  6.5<L>  /  Alb  4.1  /  TBili  1.2  /  DBili  x   /  AST  16  /  ALT  8   /  AlkPhos  79  11-29              Urinalysis Basic - ( 29 Nov 2023 14:11 )    Color: x / Appearance: x / SG: x / pH: x  Gluc: 100 mg/dL / Ketone: x  / Bili: x / Urobili: x   Blood: x / Protein: x / Nitrite: x   Leuk Esterase: x / RBC: x / WBC x   Sq Epi: x / Non Sq Epi: x / Bacteria: x        PT/INR - ( 29 Nov 2023 14:11 )   PT: 18.6 sec;   INR: 1.70 ratio         PTT - ( 29 Nov 2023 14:11 )  PTT:38.3 sec    Lactate Trend      CARDIAC MARKERS ( 29 Nov 2023 14:11 )  x     / x     / 47 U/L / x     / x            CAPILLARY BLOOD GLUCOSE      POCT Blood Glucose.: 106 mg/dL (29 Nov 2023 14:07)        < from: Xray Elbow AP + Lateral, Left (11.29.23 @ 16:37) >    IMPRESSION: Minimal left base infiltrate new since July 5 this year. No   fracture. Soft tissue swelling is seen dorsal to the proximal left ulna.    < end of copied text >    < from: CT Brain Perfusion Maps Stroke (11.29.23 @ 14:27) >    IMPRESSION:    CT brain:  No hydrocephalus, acute intracranial hemorrhage, mass effect, or brain   edema.    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  Small multifocal probable meningiomas.    CT brain perfusion:  No perfusion abnormality.    CTA brain:  No flow-limiting stenosis or vascular aneurysm. No AVM.    Venous system is well opacified, no evidence for venous sinus or cortical   vein thrombosis.    CTA neck:  No flow-limiting stenosis, evidence for arterial dissection, or vascular   aneurysm.    < end of copied text >

## 2023-11-29 NOTE — PATIENT PROFILE ADULT - FALL HARM RISK - HARM RISK INTERVENTIONS

## 2023-11-29 NOTE — ED PROVIDER NOTE - ATTENDING CONTRIBUTION TO CARE
89 yoF; with PMH significant for A-fib (on Eliquis), HTN, HLD, CHF; now presenting with pain to left upper extremity and left facial droop.  Patient lives at the [facility] and fell off her chair 1 week ago.  She has been complaining of left upper extremity pain specifically over the elbow over the past 2 to 3 days.  Family noted that patient had no facial droop yesterday at 5 PM when she was last seen.  Patient was taken to PMD this morning and found to have facial droop by PMD and sent to ED for further evaluation.  Patient denies any numbness or tingling.  Denies any focal weakness.  Denies nausea or vomiting.  Denies headache.  Patient complaining of left upper extremity pain over the lateral elbow.  Gen: Alert, NAD  Head: NC, AT, PERRL, EOMI, normal lids/conjunctiva  Neck: +supple, no tenderness/meningismus/JVD, +Trachea midline  Pulm: Bilateral BS, normal resp effort, no wheeze/stridor/retractions  CV: RRR, no M/R/G, +dist pulses  Abd: soft, NT/ND, +BS, no hepatosplenomegaly  Mskel:    L UE: from/nt @shoulder/wrist/hand. ttp @ lateral elbow, from @ elbow.    R UE: from/nt @shoulder/elbow/wrist/hand   L LE: from/nt @ hip/knee/ankle   R LE: from/nt @hip/knee/ankle   distal pulses intact  BACK: nt midline c/t/l/s spine.   Neuro: AAOx3, left facial droop  A/P: 89yoF p/w left facial droop, L UE pain  -code stroke called, labs, ekg, pain control, likely admit for MRI

## 2023-11-29 NOTE — ED ADULT NURSE REASSESSMENT NOTE - NS ED NURSE REASSESS COMMENT FT1
assumed care of pt from CC RN MEJIA Tapia @ 1610. py a&opx4, son remains at bedside. pt was awaiting xray. test preformed, awaiting results. RR wnl. bed locked in lowest position. safety maintained.

## 2023-11-29 NOTE — ED ADULT NURSE REASSESSMENT NOTE - NS ED NURSE REASSESS COMMENT FT1
pt remains resting comfortable in stretcher. denies any pain or discomfort. pt admitted to hospital, SDU. aware of POC. q2 neuro checks continue as ordered. remains on CM. pt passed dysphagia as documented. food tray ordered, all needs met at this time.,

## 2023-11-29 NOTE — GOALS OF CARE CONVERSATION - ADVANCED CARE PLANNING - CONVERSATION DETAILS
Patient reports she is DNR/DNI status and she has "filled out a form before regarding my DNR/DNI status." No document in patient chart. MOLST form completed and placed in pt chart. Patient is A&O X4,  reports she is DNR/DNI status and she has "filled out a form before regarding my DNR/DNI status." Pt states her daughter has the document at home. No document in patient chart. MOLST form completed and placed in pt chart.

## 2023-11-29 NOTE — H&P ADULT - ASSESSMENT
A 89 yo F with pmh of Afib on Eliquis, HTN, HLD, chronic CHF was sent by MD c/o bruising on LUE and L facial drop, admitted for CVA/TIA workup.     #r/o CVA/TIA  noted by outpt and ED provider with left facial droop  now appears resolved  CTH/angio without acute findings  check MR brain  TTE ordered  A1C/lipid panel  c/w lipitor 80mg and ASA for now  PT/OT  dysphagia screen  neuro called by ED, pending recs    #Fall  on LOC, appears mechanical  elbow without pain, no motion limitation  xray reviewed, no fractures  monitor Hg and PT/OT eval    #Afib withe RVR  monitor on tele  c/w home Eliquis   resume on cardizem and BB    #HTN  #HLD  c/w home cardizem and BB  hold Lasix as pt appear euvolemic on exam  Lipitor as above    #h/o CHF  on Q48hr Lasix  hold as reason above    DVT ppx:  Eliquis  Diet: pending bedside swallow eval    PT eval

## 2023-11-29 NOTE — H&P ADULT - NSHPPHYSICALEXAM_GEN_ALL_CORE
VITALS:   T(C): 36.6 (11-29-23 @ 14:33), Max: 36.6 (11-29-23 @ 14:33)  HR: 118 (11-29-23 @ 15:19) (118 - 138)  BP: 138/94 (11-29-23 @ 15:19) (131/69 - 139/90)  RR: 17 (11-29-23 @ 14:33) (17 - 18)  SpO2: 98% (11-29-23 @ 14:33) (98% - 98%)    GENERAL: NAD, lying in bed comfortably  HEAD:  Atraumatic, Normocephalic  EYES: EOMI, PERRLA, conjunctiva and sclera clear  ENT: Moist mucous membranes  NECK: Supple, No JVD  CHEST/LUNG: Clear to auscultation bilaterally; No rales, rhonchi, wheezing, or rubs. Unlabored respirations  HEART: iregular irregular, tachy; No LE edema  ABDOMEN: BSx4; Soft, nontender, nondistended  EXTREMITIES:  2+ Peripheral Pulses, brisk capillary refill. No clubbing, cyanosis, or edema  NERVOUS SYSTEM:  A&Ox3, no obvious facial droop noted  SKIN: Left arm bruising with bump noted in the below  PSYCH: Normal affect, euthymic mood VITALS:   T(C): 36.6 (11-29-23 @ 14:33), Max: 36.6 (11-29-23 @ 14:33)  HR: 118 (11-29-23 @ 15:19) (118 - 138)  BP: 138/94 (11-29-23 @ 15:19) (131/69 - 139/90)  RR: 17 (11-29-23 @ 14:33) (17 - 18)  SpO2: 98% (11-29-23 @ 14:33) (98% - 98%)    GENERAL: NAD, lying in bed comfortably  HEAD:  Atraumatic, Normocephalic  EYES: EOMI, PERRLA, conjunctiva and sclera clear  ENT: Moist mucous membranes  NECK: Supple, No JVD  CHEST/LUNG: Clear to auscultation bilaterally; No rales, rhonchi, wheezing, or rubs. Unlabored respirations  HEART: iregular irregular, tachy; No LE edema  ABDOMEN: BSx4; Soft, nontender, nondistended  EXTREMITIES:  2+ Peripheral Pulses, brisk capillary refill. No clubbing, cyanosis, or edema  NERVOUS SYSTEM:  A&Ox3, no obvious facial droop noted when smiling  SKIN: Left arm bruising with bump noted in the below  PSYCH: Normal affect, euthymic mood

## 2023-11-30 ENCOUNTER — TRANSCRIPTION ENCOUNTER (OUTPATIENT)
Age: 88
End: 2023-11-30

## 2023-11-30 ENCOUNTER — APPOINTMENT (OUTPATIENT)
Dept: OPHTHALMOLOGY | Facility: CLINIC | Age: 88
End: 2023-11-30

## 2023-11-30 VITALS
DIASTOLIC BLOOD PRESSURE: 87 MMHG | HEART RATE: 100 BPM | RESPIRATION RATE: 18 BRPM | OXYGEN SATURATION: 96 % | TEMPERATURE: 97 F | SYSTOLIC BLOOD PRESSURE: 133 MMHG

## 2023-11-30 LAB
A1C WITH ESTIMATED AVERAGE GLUCOSE RESULT: 5.7 % — HIGH (ref 4–5.6)
A1C WITH ESTIMATED AVERAGE GLUCOSE RESULT: 5.7 % — HIGH (ref 4–5.6)
ANION GAP SERPL CALC-SCNC: 10 MMOL/L — SIGNIFICANT CHANGE UP (ref 5–17)
ANION GAP SERPL CALC-SCNC: 10 MMOL/L — SIGNIFICANT CHANGE UP (ref 5–17)
BUN SERPL-MCNC: 35.2 MG/DL — HIGH (ref 8–20)
BUN SERPL-MCNC: 35.2 MG/DL — HIGH (ref 8–20)
CALCIUM SERPL-MCNC: 8.8 MG/DL — SIGNIFICANT CHANGE UP (ref 8.4–10.5)
CALCIUM SERPL-MCNC: 8.8 MG/DL — SIGNIFICANT CHANGE UP (ref 8.4–10.5)
CHLORIDE SERPL-SCNC: 106 MMOL/L — SIGNIFICANT CHANGE UP (ref 96–108)
CHLORIDE SERPL-SCNC: 106 MMOL/L — SIGNIFICANT CHANGE UP (ref 96–108)
CHOLEST SERPL-MCNC: 119 MG/DL — SIGNIFICANT CHANGE UP
CHOLEST SERPL-MCNC: 119 MG/DL — SIGNIFICANT CHANGE UP
CO2 SERPL-SCNC: 25 MMOL/L — SIGNIFICANT CHANGE UP (ref 22–29)
CO2 SERPL-SCNC: 25 MMOL/L — SIGNIFICANT CHANGE UP (ref 22–29)
CREAT SERPL-MCNC: 1 MG/DL — SIGNIFICANT CHANGE UP (ref 0.5–1.3)
CREAT SERPL-MCNC: 1 MG/DL — SIGNIFICANT CHANGE UP (ref 0.5–1.3)
EGFR: 53 ML/MIN/1.73M2 — LOW
EGFR: 53 ML/MIN/1.73M2 — LOW
ESTIMATED AVERAGE GLUCOSE: 117 MG/DL — HIGH (ref 68–114)
ESTIMATED AVERAGE GLUCOSE: 117 MG/DL — HIGH (ref 68–114)
GLUCOSE SERPL-MCNC: 98 MG/DL — SIGNIFICANT CHANGE UP (ref 70–99)
GLUCOSE SERPL-MCNC: 98 MG/DL — SIGNIFICANT CHANGE UP (ref 70–99)
HDLC SERPL-MCNC: 30 MG/DL — LOW
HDLC SERPL-MCNC: 30 MG/DL — LOW
LIPID PNL WITH DIRECT LDL SERPL: 69 MG/DL — SIGNIFICANT CHANGE UP
LIPID PNL WITH DIRECT LDL SERPL: 69 MG/DL — SIGNIFICANT CHANGE UP
NON HDL CHOLESTEROL: 89 MG/DL — SIGNIFICANT CHANGE UP
NON HDL CHOLESTEROL: 89 MG/DL — SIGNIFICANT CHANGE UP
POTASSIUM SERPL-MCNC: 4.7 MMOL/L — SIGNIFICANT CHANGE UP (ref 3.5–5.3)
POTASSIUM SERPL-MCNC: 4.7 MMOL/L — SIGNIFICANT CHANGE UP (ref 3.5–5.3)
POTASSIUM SERPL-SCNC: 4.7 MMOL/L — SIGNIFICANT CHANGE UP (ref 3.5–5.3)
POTASSIUM SERPL-SCNC: 4.7 MMOL/L — SIGNIFICANT CHANGE UP (ref 3.5–5.3)
SODIUM SERPL-SCNC: 140 MMOL/L — SIGNIFICANT CHANGE UP (ref 135–145)
SODIUM SERPL-SCNC: 140 MMOL/L — SIGNIFICANT CHANGE UP (ref 135–145)
TRIGL SERPL-MCNC: 98 MG/DL — SIGNIFICANT CHANGE UP
TRIGL SERPL-MCNC: 98 MG/DL — SIGNIFICANT CHANGE UP

## 2023-11-30 PROCEDURE — 93306 TTE W/DOPPLER COMPLETE: CPT

## 2023-11-30 PROCEDURE — 82962 GLUCOSE BLOOD TEST: CPT

## 2023-11-30 PROCEDURE — 85610 PROTHROMBIN TIME: CPT

## 2023-11-30 PROCEDURE — 70496 CT ANGIOGRAPHY HEAD: CPT | Mod: MA

## 2023-11-30 PROCEDURE — 70551 MRI BRAIN STEM W/O DYE: CPT

## 2023-11-30 PROCEDURE — 83036 HEMOGLOBIN GLYCOSYLATED A1C: CPT

## 2023-11-30 PROCEDURE — 70551 MRI BRAIN STEM W/O DYE: CPT | Mod: 26

## 2023-11-30 PROCEDURE — 85730 THROMBOPLASTIN TIME PARTIAL: CPT

## 2023-11-30 PROCEDURE — 70498 CT ANGIOGRAPHY NECK: CPT | Mod: MA

## 2023-11-30 PROCEDURE — 82550 ASSAY OF CK (CPK): CPT

## 2023-11-30 PROCEDURE — 36415 COLL VENOUS BLD VENIPUNCTURE: CPT

## 2023-11-30 PROCEDURE — 84484 ASSAY OF TROPONIN QUANT: CPT

## 2023-11-30 PROCEDURE — 80048 BASIC METABOLIC PNL TOTAL CA: CPT

## 2023-11-30 PROCEDURE — 71045 X-RAY EXAM CHEST 1 VIEW: CPT

## 2023-11-30 PROCEDURE — 70450 CT HEAD/BRAIN W/O DYE: CPT | Mod: MA

## 2023-11-30 PROCEDURE — 99285 EMERGENCY DEPT VISIT HI MDM: CPT | Mod: 25

## 2023-11-30 PROCEDURE — 97163 PT EVAL HIGH COMPLEX 45 MIN: CPT

## 2023-11-30 PROCEDURE — 85025 COMPLETE CBC W/AUTO DIFF WBC: CPT

## 2023-11-30 PROCEDURE — 99239 HOSP IP/OBS DSCHRG MGMT >30: CPT

## 2023-11-30 PROCEDURE — 73070 X-RAY EXAM OF ELBOW: CPT

## 2023-11-30 PROCEDURE — 80053 COMPREHEN METABOLIC PANEL: CPT

## 2023-11-30 PROCEDURE — 93306 TTE W/DOPPLER COMPLETE: CPT | Mod: 26

## 2023-11-30 PROCEDURE — 0042T: CPT | Mod: MA

## 2023-11-30 PROCEDURE — 99223 1ST HOSP IP/OBS HIGH 75: CPT

## 2023-11-30 PROCEDURE — 80061 LIPID PANEL: CPT

## 2023-11-30 PROCEDURE — 73090 X-RAY EXAM OF FOREARM: CPT

## 2023-11-30 PROCEDURE — 93005 ELECTROCARDIOGRAM TRACING: CPT

## 2023-11-30 RX ADMIN — APIXABAN 2.5 MILLIGRAM(S): 2.5 TABLET, FILM COATED ORAL at 06:07

## 2023-11-30 RX ADMIN — PREGABALIN 1000 MICROGRAM(S): 225 CAPSULE ORAL at 13:36

## 2023-11-30 RX ADMIN — Medication 240 MILLIGRAM(S): at 13:35

## 2023-11-30 RX ADMIN — Medication 50 MILLIGRAM(S): at 06:07

## 2023-11-30 NOTE — DISCHARGE NOTE PROVIDER - NSDCCPCAREPLAN_GEN_ALL_CORE_FT
PRINCIPAL DISCHARGE DIAGNOSIS  Diagnosis: Facial droop  Assessment and Plan of Treatment: MRI negative for CVA, likely physiological facial droop. f/u PCP outpatient.      SECONDARY DISCHARGE DIAGNOSES  Diagnosis: Afib  Assessment and Plan of Treatment:     Diagnosis: Abnormal MRI  Assessment and Plan of Treatment: followup with PCP for repeat MRI within 6-12 months of discharge.

## 2023-11-30 NOTE — DISCHARGE NOTE PROVIDER - NSDCCPTREATMENT_GEN_ALL_CORE_FT
PRINCIPAL PROCEDURE  Procedure: MRI  Findings and Treatment: IMPRESSION:  No acute or subacute stroke.  Nonspecific 8mm soft tissue mass within the left intraconal space again   seen, may represent a cavernous hemangioma. Follow-up MRI of the orbits   can be obtained in 6-12 months to ensure stability.  Probable small intracranial meningiomas without significant mass effect.

## 2023-11-30 NOTE — DISCHARGE NOTE PROVIDER - HOSPITAL COURSE
A 89 yo F with pmh of Afib on Eliquis, HTN, HLD, chronic CHF was sent by MD c/o bruising on LUE and L facial drop, admitted for CVA/TIA workup. . CTH angio and MRI negative for rangel. noted with incidental finding of hemangioma and meningioma. Defer to outpatient PCP for repeat MRI. Seen by neuro, believes to have physiological droop. Unlikely CVA/TIA. Of note, also had left elbow bruising, no issues with range of motion, xray without fracture. Continue with PRN heat packs.     Stable for discharge.      A 89 yo F with pmh of Afib on Eliquis, HTN, HLD, chronic CHF was sent by MD c/o bruising on LUE and L facial drop, admitted for CVA/TIA workup. . CTH angio and MRI negative for rangel. noted with incidental finding of hemangioma and meningioma. Defer to outpatient PCP for repeat MRI. Seen by neuro, case d/w neuro, believes to have physiological droop. Unlikely CVA/TIA. Of note, also had left elbow bruising, no issues with range of motion, xray without fracture. Continue with PRN heat packs.     Stable for discharge.

## 2023-11-30 NOTE — CONSULT NOTE ADULT - ASSESSMENT
COMPLETE A/P PENDING   ASSESSMENT:   A 89 yo F with PMHx of Afib on Eliquis, HTN, HLD, chronic CHF came to ED 11/29 1ith bruising on LUE and L facial droop. Unclear time of onset, patient states that her outpatient MD noticed her symptoms but she did not feel that there was any asymmetry in her face prior to this appointment. NIHSS was 1 at time of ED presentation. CT head showed no acute abnormalities, CT angio head/neck with no flow limiting stenosis or occlusion. Patient excluded from thrombectomy due to imaging findings; not a tenecteplase candidate due to patient's use of Eliquis plus abnormal coagulation studies at time of presentation. MRI head negative for acute infarct, but with incidental findings of soft tissue mass in the left intraconal space concerning for cavernous hemangioma; as well as probable small intracranial meningiomas.     NEURO:   -Neurologically ---   -Continue close monitoring for neurologic deterioration    - Stroke neuro checks q _   - Permissive HTN or  SBP goal  -ANTITHROMBOTIC THERAPY: Eliquis 2.5 mg BID   -titrate statin to LDL goal less than 70  -MRI Brain w/o findings as above, no acute stroke   -Dysphagia screen: pass  -Physical therapy/OT/Speech eval/treatment.     CARDIOVASCULAR:   -check TTE  -cardiac monitoring w/ telemetry for now, further evaluation pending findings of noted workup                              HEMATOLOGY:   -H/H 12.1/35.5, Platelets 141, monitor thrombocytopenia per primary team, patient should have all age and risk appropriate malignancy screenings with PCP or sooner if clinically suspected   -DVT ppx: Heparin s.c [] LMWH [] Eliquis [x]    PULMONARY:   -protecting airway, saturating well     RENAL:   -BUN/Cr 35.2/1.00, monitor urine output, maintain adequate hydration    -Na Goal:  135-145    ID:   -afebrile, no leukocytosis, monitor for si/sx of infection     OTHER:    -condition and plan of care d/w patient, questions and concerns addressed.     DISPOSITION:   -ehab or home depending on PT eval once stable and workup is complete      CORE MEASURES:        Admission NIHSS: 1     Tenecteplase : [] YES [x] NO      LDL/HDL/A1C: 69/30/5.7     Depression Screen- if depression hx and/or present      Statin Therapy: Atorvastatin 80 mg PO daily      Dysphagia Screen: [x] PASS [] FAIL     Smoking [] YES [x] NO      Afib [x] YES [] NO     Stroke Education [] YES [] NO [x] pending     Obtain screening lower extremity venous ultrasound in patients who meet 1 or more of the following criteria as patient is high risk for DVT/PE on admission:   [] History of DVT/PE  []Hypercoagulable states (Factor V Leiden, Cancer, OCP, etc. )  []Prolonged immobility (hemiplegia/hemiparesis/post operative or any other extended immobilization)  [] Transferred from outside facility (Rehab or Long term care)  [] Age </= to 50 COMPLETE A/P PENDING   ASSESSMENT:   A 89 yo F with PMHx of Afib on Eliquis, HTN, HLD, chronic CHF came to ED 11/29 1ith bruising on LUE and L facial droop. Unclear time of onset, patient states that her outpatient MD noticed her symptoms but she did not feel that there was any asymmetry in her face prior to this appointment. NIHSS was 1 at time of ED presentation. CT head showed no acute abnormalities, CT angio head/neck with no flow limiting stenosis or occlusion. Patient excluded from thrombectomy due to imaging findings; not a tenecteplase candidate due to patient's use of Eliquis plus abnormal coagulation studies at time of presentation. MRI head negative for acute infarct, but with incidental findings of soft tissue mass in the left intraconal space concerning for cavernous hemangioma; as well as probable small intracranial meningiomas. Given negative MRI patient's symptoms more likely related to peripheral nerve pathology.     NEURO:   -Neurologically with no focal neurologic deficits on exam    -Continue close monitoring for neurologic deterioration    -BP goal is normotension   -ANTITHROMBOTIC THERAPY: Eliquis 2.5 mg BID   -titrate statin to LDL goal less than 70; LDL=69  -MRI Brain w/o findings as above, no acute stroke   -Dysphagia screen: pass  -Physical therapy/OT/Speech eval/treatment.   -Follow up outpatient with neurology regarding incidental findings of cavernous hemangioma, intracranial meningioma     CARDIOVASCULAR:   -check TTE  -cardiac monitoring w/ telemetry for now, further evaluation pending findings of noted workup                              HEMATOLOGY:   -H/H 12.1/35.5, Platelets 141, monitor thrombocytopenia per primary team, patient should have all age and risk appropriate malignancy screenings with PCP or sooner if clinically suspected   -DVT ppx: Heparin s.c [] LMWH [] Eliquis [x]    PULMONARY:   -protecting airway, saturating well     RENAL:   -BUN/Cr 35.2/1.00, monitor urine output, maintain adequate hydration    -Na Goal:  135-145    ID:   -afebrile, no leukocytosis, monitor for si/sx of infection     OTHER:    -condition and plan of care d/w patient, questions and concerns addressed.     DISPOSITION:   -Rehab or home depending on PT eval once stable and workup is complete      CORE MEASURES:        Admission NIHSS: 1     Tenecteplase : [] YES [x] NO      LDL/HDL/A1C: 69/30/5.7     Depression Screen- if depression hx and/or present      Statin Therapy: Atorvastatin 80 mg PO daily      Dysphagia Screen: [x] PASS [] FAIL     Smoking [] YES [x] NO      Afib [x] YES [] NO     Stroke Education [] YES [] NO [x] pending     Obtain screening lower extremity venous ultrasound in patients who meet 1 or more of the following criteria as patient is high risk for DVT/PE on admission:   [] History of DVT/PE  []Hypercoagulable states (Factor V Leiden, Cancer, OCP, etc. )  []Prolonged immobility (hemiplegia/hemiparesis/post operative or any other extended immobilization)  [] Transferred from outside facility (Rehab or Long term care)  [] Age </= to 50  ASSESSMENT:   A 89 yo F with PMHx of Afib on Eliquis, HTN, HLD, chronic CHF came to ED 11/29 1ith bruising on LUE and L facial droop. Unclear time of onset, patient states that her outpatient MD noticed her symptoms but she did not feel that there was any asymmetry in her face prior to this appointment. NIHSS was 1 at time of ED presentation. CT head showed no acute abnormalities, CT angio head/neck with no flow limiting stenosis or occlusion. Patient excluded from thrombectomy due to imaging findings; not a tenecteplase candidate due to patient's use of Eliquis plus abnormal coagulation studies at time of presentation. MRI head negative for acute infarct, but with incidental findings of soft tissue mass in the left intraconal space concerning for cavernous hemangioma; as well as probable small intracranial meningiomas. No obvious evidence of vascular ischemia; given history of atrial fibrillation would continue therapeutic anticoagulation for stroke prevention.     NEURO:   -Neurologically with slight left facial droop which corrects with smile (unclear if baseline, patient states she has not noticed any facial droop); otherwise no focal neurologic deficits on exam    -Continue close monitoring for neurologic deterioration    -BP goal is normotension   -ANTITHROMBOTIC THERAPY: Eliquis 2.5 mg BID   -titrate statin to LDL goal less than 70; LDL=69  -MRI Brain w/o findings as above, no acute stroke   -Dysphagia screen: pass  -Physical therapy/OT/Speech eval/treatment.   -Follow up outpatient with neurology regarding incidental findings of cavernous hemangioma, intracranial meningioma     CARDIOVASCULAR:   -check TTE  -cardiac monitoring w/ telemetry for now, further evaluation pending findings of noted workup                              HEMATOLOGY:   -H/H 12.1/35.5, Platelets 141, monitor thrombocytopenia per primary team, patient should have all age and risk appropriate malignancy screenings with PCP or sooner if clinically suspected   -DVT ppx: Heparin s.c [] LMWH [] Eliquis [x]    PULMONARY:   -protecting airway, saturating well     RENAL:   -BUN/Cr 35.2/1.00, monitor urine output, maintain adequate hydration    -Na Goal:  135-145    ID:   -afebrile, no leukocytosis, monitor for si/sx of infection     OTHER:    -condition and plan of care d/w patient, questions and concerns addressed.     DISPOSITION:   -Rehab or home depending on PT eval once stable and workup is complete      CORE MEASURES:        Admission NIHSS: 1     Tenecteplase : [] YES [x] NO      LDL/HDL/A1C: 69/30/5.7     Depression Screen- if depression hx and/or present      Statin Therapy: Atorvastatin 80 mg PO daily      Dysphagia Screen: [x] PASS [] FAIL     Smoking [] YES [x] NO      Afib [x] YES [] NO     Stroke Education [] YES [] NO [x] pending     Obtain screening lower extremity venous ultrasound in patients who meet 1 or more of the following criteria as patient is high risk for DVT/PE on admission:   [] History of DVT/PE  []Hypercoagulable states (Factor V Leiden, Cancer, OCP, etc. )  []Prolonged immobility (hemiplegia/hemiparesis/post operative or any other extended immobilization)  [] Transferred from outside facility (Rehab or Long term care)  [] Age </= to 50  ASSESSMENT:   A 89 yo F with PMHx of Afib on Eliquis, HTN, HLD, chronic CHF came to ED 11/29 1ith bruising on LUE and L facial droop. Unclear time of onset, patient states that her outpatient MD noticed her symptoms but she did not feel that there was any asymmetry in her face prior to this appointment. NIHSS was 1 at time of ED presentation. CT head showed no acute abnormalities, CT angio head/neck with no flow limiting stenosis or occlusion. Patient excluded from thrombectomy due to imaging findings; not a tenecteplase candidate due to patient's use of Eliquis plus abnormal coagulation studies at time of presentation. MRI head negative for acute infarct, but with incidental findings of soft tissue mass in the left intraconal space concerning for cavernous hemangioma; as well as probable small intracranial meningiomas. No obvious evidence of vascular ischemia; given history of atrial fibrillation would continue therapeutic anticoagulation for stroke prevention.     NEURO:   -Neurologically with slight left facial droop which corrects with smile (unclear if baseline, patient states she has not noticed any facial droop); otherwise no focal neurologic deficits on exam    -Continue close monitoring for neurologic deterioration    -BP goal is normotension   -ANTITHROMBOTIC THERAPY: Eliquis 2.5 mg BID   -titrate statin to LDL goal less than 70; LDL=69  -MRI Brain w/o findings as above, no acute stroke   -Dysphagia screen: pass  -Physical therapy/OT/Speech eval/treatment.   -Follow up outpatient with PCP regarding incidental findings of cavernous hemangioma, intracranial meningioma     CARDIOVASCULAR:   -check TTE  -cardiac monitoring w/ telemetry for now, further evaluation pending findings of noted workup                              HEMATOLOGY:   -H/H 12.1/35.5, Platelets 141, monitor thrombocytopenia per primary team, patient should have all age and risk appropriate malignancy screenings with PCP or sooner if clinically suspected   -DVT ppx: Heparin s.c [] LMWH [] Eliquis [x]    PULMONARY:   -protecting airway, saturating well     RENAL:   -BUN/Cr 35.2/1.00, monitor urine output, maintain adequate hydration    -Na Goal:  135-145    ID:   -afebrile, no leukocytosis, monitor for si/sx of infection     OTHER:    -condition and plan of care d/w patient, questions and concerns addressed.     DISPOSITION:   -Rehab or home depending on PT eval once stable and workup is complete      CORE MEASURES:        Admission NIHSS: 1     Tenecteplase : [] YES [x] NO      LDL/HDL/A1C: 69/30/5.7     Depression Screen- if depression hx and/or present      Statin Therapy: Atorvastatin 80 mg PO daily      Dysphagia Screen: [x] PASS [] FAIL     Smoking [] YES [x] NO      Afib [x] YES [] NO     Stroke Education [] YES [] NO [x] pending     Obtain screening lower extremity venous ultrasound in patients who meet 1 or more of the following criteria as patient is high risk for DVT/PE on admission:   [] History of DVT/PE  []Hypercoagulable states (Factor V Leiden, Cancer, OCP, etc. )  []Prolonged immobility (hemiplegia/hemiparesis/post operative or any other extended immobilization)  [] Transferred from outside facility (Rehab or Long term care)  [] Age </= to 50  ASSESSMENT:   A 89 yo F with PMHx of Afib on Eliquis, HTN, HLD, chronic CHF came to ED 11/29 1ith bruising on LUE and L facial droop. Unclear time of onset, patient states that her outpatient MD noticed her symptoms but she did not feel that there was any asymmetry in her face prior to this appointment. NIHSS was 1 at time of ED presentation. CT head showed no acute abnormalities, CT angio head/neck with no flow limiting stenosis or occlusion. Patient excluded from thrombectomy due to imaging findings; not a tenecteplase candidate due to patient's use of Eliquis plus abnormal coagulation studies at time of presentation. MRI head negative for acute infarct, but with incidental findings of soft tissue mass in the left intraconal space concerning for cavernous hemangioma; as well as probable small intracranial meningiomas. No obvious evidence of vascular ischemia; given history of atrial fibrillation would continue therapeutic anticoagulation for stroke prevention.     NEURO:   -Neurologically with slight left facial droop which corrects with smile (pt reports that its her baseline); otherwise no focal neurologic deficits on exam    -BP goal is normotension   -ANTITHROMBOTIC THERAPY: Eliquis 2.5 mg BID   -titrate statin to LDL goal less than 70; LDL=69  -MRI Brain w/o findings as above, no acute stroke   -Dysphagia screen: pass  -Physical therapy/OT/Speech eval/treatment.   -Follow up outpatient with PCP regarding incidental findings of cavernous hemangioma, intracranial meningioma   No need for further stroke work up at this time as pt did not have a stroke    CARDIOVASCULAR:   -no neurological indication for TTE  -cardiac monitoring w/ telemetry for now                 HEMATOLOGY:   -H/H 12.1/35.5, Platelets 141, monitor thrombocytopenia per primary team, patient should have all age and risk appropriate malignancy screenings with PCP or sooner if clinically suspected   -DVT ppx: Heparin s.c [] LMWH [] Eliquis [x]    PULMONARY:   -protecting airway, saturating well     RENAL:   -BUN/Cr 35.2/1.00, monitor urine output, maintain adequate hydration    -Na Goal:  135-145    ID:   -afebrile, no leukocytosis, monitor for si/sx of infection     OTHER:    -condition and plan of care d/w patient, questions and concerns addressed.     DISPOSITION:   -Rehab or home depending on PT eval once stable and workup is complete      CORE MEASURES:        Admission NIHSS: 1     Tenecteplase : [] YES [x] NO      LDL/HDL/A1C: 69/30/5.7     Depression Screen- if depression hx and/or present      Statin Therapy: Atorvastatin 80 mg PO daily      Dysphagia Screen: [x] PASS [] FAIL     Smoking [] YES [x] NO      Afib [x] YES [] NO     Stroke Education [] YES [] NO [x] pending     Obtain screening lower extremity venous ultrasound in patients who meet 1 or more of the following criteria as patient is high risk for DVT/PE on admission:   [] History of DVT/PE  []Hypercoagulable states (Factor V Leiden, Cancer, OCP, etc. )  []Prolonged immobility (hemiplegia/hemiparesis/post operative or any other extended immobilization)  [] Transferred from outside facility (Rehab or Long term care)  [] Age </= to 50

## 2023-11-30 NOTE — DISCHARGE NOTE NURSING/CASE MANAGEMENT/SOCIAL WORK - NSDCPEFALRISK_GEN_ALL_CORE
For information on Fall & Injury Prevention, visit: [email or web address] OR  [email or web address] OR  https://www.cdc.gov/steadi/patient.html

## 2023-11-30 NOTE — DISCHARGE NOTE NURSING/CASE MANAGEMENT/SOCIAL WORK - PATIENT PORTAL LINK FT
You can access the FollowMyHealth Patient Portal offered by [facility] by registering at the following website: [email or web address]. By joining [employer]’s FollowMyHealth portal, you will also be able to view your health information using other applications (apps) compatible with our system.

## 2023-11-30 NOTE — DISCHARGE NOTE PROVIDER - ATTENDING DISCHARGE PHYSICAL EXAMINATION:
VITALS:   T(C): 36.3 (11-30-23 @ 08:29), Max: 36.9 (11-29-23 @ 20:07)  HR: 100 (11-30-23 @ 08:29) (78 - 138)  BP: 133/87 (11-30-23 @ 08:29) (107/69 - 138/94)  RR: 18 (11-30-23 @ 08:29) (17 - 18)  SpO2: 96% (11-30-23 @ 08:29) (93% - 98%)    GENERAL: NAD, lying in bed comfortably  HEAD:  Atraumatic, Normocephalic  EYES: EOMI, PERRLA, conjunctiva and sclera clear  ENT: Moist mucous membranes  NECK: Supple, No JVD  CHEST/LUNG: Clear to auscultation bilaterally; No rales, rhonchi, wheezing, or rubs. Unlabored respirations  HEART: iregular irregular, tachy; No LE edema  ABDOMEN: BSx4; Soft, nontender, nondistended  EXTREMITIES:  2+ Peripheral Pulses, brisk capillary refill. No clubbing, cyanosis, or edema  NERVOUS SYSTEM:  A&Ox3, no obvious facial droop noted when smiling  SKIN: Left arm bruising with bump noted in the below  PSYCH: Normal affect, euthymic mood

## 2023-11-30 NOTE — CONSULT NOTE ADULT - SUBJECTIVE AND OBJECTIVE BOX
Preliminary note, official note pending attending review/signature.                               [facility] Stroke Team  CC: L facial droop   HPI:  A 89 yo F with pmh of Afib on Eliquis, HTN, HLD, chronic CHF was sent by MD c/o bruising on LUE and L facial drop. Per patient, she was doing well and did not see any asymmetry in her face. Went to see her PCP and PCP's PA earlier today and was noted with left sided facial droop and came to the ED for further care. Denies vision changes, no prior TIA/CVA, weakness. Of note, had a fall and landed on her left elbow a week ago, noted with bruising with no limitations in motions.   ED vitals and labs stable, CTH and angio negative for acute pathologies, admitted for CVA/TIA workup.     PAST MEDICAL & SURGICAL HISTORY:  Mild HTN  Chronic atrial fibrillation  HLD (hyperlipidemia)  Chronic CHF  History of left mastectomy    MEDICATIONS  (STANDING):  apixaban 2.5 milliGRAM(s) Oral two times a day  aspirin  chewable 81 milliGRAM(s) Oral daily  atorvastatin 80 milliGRAM(s) Oral at bedtime  cyanocobalamin 1000 MICROGram(s) Oral daily  diltiazem    milliGRAM(s) Oral daily  influenza  Vaccine (HIGH DOSE) 0.7 milliLiter(s) IntraMuscular once  metoprolol succinate ER 50 milliGRAM(s) Oral two times a day    MEDICATIONS  (PRN):  acetaminophen     Tablet .. 650 milliGRAM(s) Oral every 6 hours PRN Temp greater or equal to 38C (100.4F), Mild Pain (1 - 3)  aluminum hydroxide/magnesium hydroxide/simethicone Suspension 30 milliLiter(s) Oral every 4 hours PRN Dyspepsia  melatonin 3 milliGRAM(s) Oral at bedtime PRN Insomnia  metoprolol tartrate Injectable 5 milliGRAM(s) IV Push every 6 hours PRN persistent HR > 130  ondansetron Injectable 4 milliGRAM(s) IV Push every 8 hours PRN Nausea and/or Vomiting      Allergies  No Known Allergies    Intolerances    SOCIAL HISTORY:  no tob,   no alcohol   no drugs    FAMILY HISTORY:  Family history of heart disease (Father)    ROS: 14 point ROS negative other than what is present in HPI or below    Vital Signs Last 24 Hrs  T(C): 36.3 (30 Nov 2023 08:29), Max: 36.9 (29 Nov 2023 20:07)  T(F): 97.4 (30 Nov 2023 08:29), Max: 98.4 (29 Nov 2023 20:07)  HR: 100 (30 Nov 2023 08:29) (78 - 138)  BP: 133/87 (30 Nov 2023 08:29) (107/69 - 139/90)  BP(mean): --  RR: 18 (30 Nov 2023 08:29) (17 - 18)  SpO2: 96% (30 Nov 2023 08:29) (93% - 98%)    Parameters below as of 30 Nov 2023 08:29  Patient On (Oxygen Delivery Method): room air    COMPLETE EXAM PENDING     General: NAD    Detailed Neurologic Exam:    Mental status: The patient is awake and alert and has normal attention span.  The patient is fully oriented in 3 spheres. The patient is oriented to current events. The patient is able to name objects, follow commands, repeat sentences.    Cranial nerves: Pupils equal and react symmetrically to light. There is no visual field deficit to confrontation. Extraocular motion is full with no nystagmus. There is no ptosis. Facial sensation is intact. Facial musculature is symmetric. Palate elevates symmetrically. Tongue is midline.    Motor: There is normal bulk and tone.  There is no tremor.  Strength is 5/5 in the right arm and leg.   Strength is 5/5 in the left arm and leg.    Sensation: Intact to light touch and pin in 4 extremities    Reflexes: 1-2+ throughout and plantar responses are flexor.    Cerebellar: There is no dysmetria on finger to nose testing.    Gait : deferred    NIH SS:  DATE: 11/30/23  TIME:  1A: Level of consciousness (0-3):   1B: Questions (0-2):   1C: Commands (0-2):   2: Gaze (0-2):   3: Visual fields (0-3):   4: Facial palsy (0-3):   MOTOR:  5A: Left arm motor drift (0-4):   5B: Right arm motor drift (0-4):   6A: Left leg motor drift (0-4):   6B: Right leg motor drift (0-4):   7: Limb ataxia (0-2):   SENSORY:  8: Sensation (0-2):   SPEECH:  9: Language (0-3):   10: Dysarthria (0-2):   EXTINCTION:  11: Extinction/inattention (0-2):     TOTAL SCORE:     prehospital mRS=3      LABS:                         12.1   6.52  )-----------( 141      ( 29 Nov 2023 14:11 )             35.5       11-30    140  |  106  |  35.2<H>  ----------------------------<  98  4.7   |  25.0  |  1.00    Ca    8.8      30 Nov 2023 06:39    TPro  6.5<L>  /  Alb  4.1  /  TBili  1.2  /  DBili  x   /  AST  16  /  ALT  8   /  AlkPhos  79  11-29      PT/INR - ( 29 Nov 2023 14:11 )   PT: 18.6 sec;   INR: 1.70 ratio         PTT - ( 29 Nov 2023 14:11 )  PTT:38.3 sec    Lipid panel: Lipid Profile (11.30.23 @ 06:39)    Cholesterol: 119 mg/dL   Triglycerides, Serum: 98 mg/dL   HDL Cholesterol: 30 mg/dL   Non HDL Cholesterol: 89   LDL Cholesterol Calculated: 69 mg/dL    A1C with Estimated Average Glucose (11.30.23 @ 06:39)    A1C with Estimated Average Glucose Result: 5.7 %   Estimated Average Glucose: 117 mg/dL    RADIOLOGY & ADDITIONAL STUDIES (independently reviewed unless otherwise noted):    MR Head No Cont (11.30.23 @ 02:35)   IMPRESSION:  No acute or subacute stroke.    Nonspecific 8mm soft tissue mass within the left intraconal space again   seen, may represent a cavernous hemangioma. Follow-up MRI of the orbits   can be obtained in 6-12 months to ensure stability.    Probable small intracranial meningiomas without significant mass effect.    CT Brain, CTA Head, CTA Neck w/ IV Cont, CT Perfusion, Stroke Protocol (11.29.23 @ 14:15)   IMPRESSION:  CT brain:  No hydrocephalus, acute intracranial hemorrhage, mass effect, or brain   edema.  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  Small multifocal probable meningiomas.    CT brain perfusion:  No perfusion abnormality.    CTA brain:  No flow-limiting stenosis or vascular aneurysm. No AVM.  Venous system is well opacified, no evidence for venous sinus or cortical   vein thrombosis.    CTA neck:  No flow-limiting stenosis, evidence for arterial dissection, or vascular   aneurysm.       Preliminary note, official note pending attending review/signature.                               [facility] Stroke Team  CC: L facial droop   HPI:  A 89 yo F with pmh of Afib on Eliquis, HTN, HLD, chronic CHF was sent by MD c/o bruising on LUE and L facial drop. Per patient, she was doing well and did not see any asymmetry in her face. Went to see her PCP and PCP's PA earlier today and was noted with left sided facial droop and came to the ED for further care. Denies vision changes, no prior TIA/CVA, weakness. Of note, had a fall and landed on her left elbow a week ago, noted with bruising with no limitations in motions.   ED vitals and labs stable, CTH and angio negative for acute pathologies, admitted for CVA/TIA workup.     PAST MEDICAL & SURGICAL HISTORY:  Mild HTN  Chronic atrial fibrillation  HLD (hyperlipidemia)  Chronic CHF  History of left mastectomy    MEDICATIONS  (STANDING):  apixaban 2.5 milliGRAM(s) Oral two times a day  aspirin  chewable 81 milliGRAM(s) Oral daily  atorvastatin 80 milliGRAM(s) Oral at bedtime  cyanocobalamin 1000 MICROGram(s) Oral daily  diltiazem    milliGRAM(s) Oral daily  influenza  Vaccine (HIGH DOSE) 0.7 milliLiter(s) IntraMuscular once  metoprolol succinate ER 50 milliGRAM(s) Oral two times a day    MEDICATIONS  (PRN):  acetaminophen     Tablet .. 650 milliGRAM(s) Oral every 6 hours PRN Temp greater or equal to 38C (100.4F), Mild Pain (1 - 3)  aluminum hydroxide/magnesium hydroxide/simethicone Suspension 30 milliLiter(s) Oral every 4 hours PRN Dyspepsia  melatonin 3 milliGRAM(s) Oral at bedtime PRN Insomnia  metoprolol tartrate Injectable 5 milliGRAM(s) IV Push every 6 hours PRN persistent HR > 130  ondansetron Injectable 4 milliGRAM(s) IV Push every 8 hours PRN Nausea and/or Vomiting      Allergies  No Known Allergies    Intolerances    SOCIAL HISTORY:  no tob,   no alcohol   no drugs    FAMILY HISTORY:  Family history of heart disease (Father)    ROS: 14 point ROS negative other than what is present in HPI or below    Vital Signs Last 24 Hrs  T(C): 36.3 (30 Nov 2023 08:29), Max: 36.9 (29 Nov 2023 20:07)  T(F): 97.4 (30 Nov 2023 08:29), Max: 98.4 (29 Nov 2023 20:07)  HR: 100 (30 Nov 2023 08:29) (78 - 138)  BP: 133/87 (30 Nov 2023 08:29) (107/69 - 139/90)  BP(mean): --  RR: 18 (30 Nov 2023 08:29) (17 - 18)  SpO2: 96% (30 Nov 2023 08:29) (93% - 98%)    Parameters below as of 30 Nov 2023 08:29  Patient On (Oxygen Delivery Method): room air    COMPLETE EXAM PENDING     General: NAD    Detailed Neurologic Exam:    Mental status: The patient is awake and alert and has normal attention span.  The patient is fully oriented in 3 spheres. The patient is oriented to current events. The patient is able to name objects, follow commands, repeat sentences. Mild stutter noted on exam.     Cranial nerves: Pupils equal and react symmetrically to light. There is no visual field deficit to confrontation. Extraocular motion is full with no nystagmus. There is no ptosis. Facial sensation is intact. Slight left facial droop noted which corrects with smile. Palate elevates symmetrically. Tongue is midline.    Motor: There is normal bulk and tone.  There is no tremor.  Strength is 5/5 in the right arm and leg.   Strength is 5/5 in the left arm and leg.    Sensation: Intact to light touch in 4 extremities    Cerebellar: There is no dysmetria on finger to nose testing.    Gait : deferred    NIH SS:  DATE: 11/30/23  TIME: 12:45 hrs   1A: Level of consciousness (0-3): 0  1B: Questions (0-2): 0  1C: Commands (0-2): 0  2: Gaze (0-2): 0  3: Visual fields (0-3): 0  4: Facial palsy (0-3): 1  MOTOR:  5A: Left arm motor drift (0-4): 0  5B: Right arm motor drift (0-4): 0  6A: Left leg motor drift (0-4): 0  6B: Right leg motor drift (0-4): 0  7: Limb ataxia (0-2): 0  SENSORY:  8: Sensation (0-2): 0  SPEECH:  9: Language (0-3): 0  10: Dysarthria (0-2): 0  EXTINCTION:  11: Extinction/inattention (0-2): 0    TOTAL SCORE: 1    prehospital mRS=3      LABS:                         12.1   6.52  )-----------( 141      ( 29 Nov 2023 14:11 )             35.5       11-30    140  |  106  |  35.2<H>  ----------------------------<  98  4.7   |  25.0  |  1.00    Ca    8.8      30 Nov 2023 06:39    TPro  6.5<L>  /  Alb  4.1  /  TBili  1.2  /  DBili  x   /  AST  16  /  ALT  8   /  AlkPhos  79  11-29      PT/INR - ( 29 Nov 2023 14:11 )   PT: 18.6 sec;   INR: 1.70 ratio         PTT - ( 29 Nov 2023 14:11 )  PTT:38.3 sec    Lipid panel: Lipid Profile (11.30.23 @ 06:39)    Cholesterol: 119 mg/dL   Triglycerides, Serum: 98 mg/dL   HDL Cholesterol: 30 mg/dL   Non HDL Cholesterol: 89   LDL Cholesterol Calculated: 69 mg/dL    A1C with Estimated Average Glucose (11.30.23 @ 06:39)    A1C with Estimated Average Glucose Result: 5.7 %   Estimated Average Glucose: 117 mg/dL    RADIOLOGY & ADDITIONAL STUDIES (independently reviewed unless otherwise noted):    MR Head No Cont (11.30.23 @ 02:35)   IMPRESSION:  No acute or subacute stroke.    Nonspecific 8mm soft tissue mass within the left intraconal space again   seen, may represent a cavernous hemangioma. Follow-up MRI of the orbits   can be obtained in 6-12 months to ensure stability.    Probable small intracranial meningiomas without significant mass effect.    CT Brain, CTA Head, CTA Neck w/ IV Cont, CT Perfusion, Stroke Protocol (11.29.23 @ 14:15)   IMPRESSION:  CT brain:  No hydrocephalus, acute intracranial hemorrhage, mass effect, or brain   edema.  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  Small multifocal probable meningiomas.    CT brain perfusion:  No perfusion abnormality.    CTA brain:  No flow-limiting stenosis or vascular aneurysm. No AVM.  Venous system is well opacified, no evidence for venous sinus or cortical   vein thrombosis.    CTA neck:  No flow-limiting stenosis, evidence for arterial dissection, or vascular   aneurysm.       Preliminary note, official note pending attending review/signature.                               [facility] Stroke Team  CC: L facial droop   HPI:  A 89 yo F with pmh of Afib on Eliquis, HTN, HLD, chronic CHF was sent by MD c/o bruising on LUE and L facial drop. Per patient, she was doing well and did not see any asymmetry in her face. Went to see her PCP and PCP's PA earlier today and was noted with left sided facial droop and came to the ED for further care. Denies vision changes, no prior TIA/CVA, weakness. Of note, had a fall and landed on her left elbow a week ago, noted with bruising with no limitations in motions.   ED vitals and labs stable, CTH and angio negative for acute pathologies, admitted for CVA/TIA workup.     PAST MEDICAL & SURGICAL HISTORY:  Mild HTN  Chronic atrial fibrillation  HLD (hyperlipidemia)  Chronic CHF  History of left mastectomy    MEDICATIONS  (STANDING):  apixaban 2.5 milliGRAM(s) Oral two times a day  aspirin  chewable 81 milliGRAM(s) Oral daily  atorvastatin 80 milliGRAM(s) Oral at bedtime  cyanocobalamin 1000 MICROGram(s) Oral daily  diltiazem    milliGRAM(s) Oral daily  influenza  Vaccine (HIGH DOSE) 0.7 milliLiter(s) IntraMuscular once  metoprolol succinate ER 50 milliGRAM(s) Oral two times a day    MEDICATIONS  (PRN):  acetaminophen     Tablet .. 650 milliGRAM(s) Oral every 6 hours PRN Temp greater or equal to 38C (100.4F), Mild Pain (1 - 3)  aluminum hydroxide/magnesium hydroxide/simethicone Suspension 30 milliLiter(s) Oral every 4 hours PRN Dyspepsia  melatonin 3 milliGRAM(s) Oral at bedtime PRN Insomnia  metoprolol tartrate Injectable 5 milliGRAM(s) IV Push every 6 hours PRN persistent HR > 130  ondansetron Injectable 4 milliGRAM(s) IV Push every 8 hours PRN Nausea and/or Vomiting      Allergies  No Known Allergies    Intolerances    SOCIAL HISTORY:  no tob,   no alcohol   no drugs    FAMILY HISTORY:  Family history of heart disease (Father)    ROS: 14 point ROS negative other than what is present in HPI or below    Vital Signs Last 24 Hrs  T(C): 36.3 (30 Nov 2023 08:29), Max: 36.9 (29 Nov 2023 20:07)  T(F): 97.4 (30 Nov 2023 08:29), Max: 98.4 (29 Nov 2023 20:07)  HR: 100 (30 Nov 2023 08:29) (78 - 138)  BP: 133/87 (30 Nov 2023 08:29) (107/69 - 139/90)  BP(mean): --  RR: 18 (30 Nov 2023 08:29) (17 - 18)  SpO2: 96% (30 Nov 2023 08:29) (93% - 98%)    Parameters below as of 30 Nov 2023 08:29  Patient On (Oxygen Delivery Method): room air    General: NAD    Detailed Neurologic Exam:    Mental status: The patient is awake and alert and has normal attention span.  The patient is fully oriented in 3 spheres. The patient is oriented to current events. The patient is able to name objects, follow commands, repeat sentences. Mild stutter noted on exam.     Cranial nerves: Pupils equal and react symmetrically to light. There is no visual field deficit to confrontation. Extraocular motion is full with no nystagmus. There is no ptosis. Facial sensation is intact. Slight left facial droop noted which corrects with smile. Palate elevates symmetrically. Tongue is midline.    Motor: There is normal bulk and tone.  There is no tremor.  Strength is 5/5 in the right arm and leg.   Strength is 5/5 in the left arm and leg.    Sensation: Intact to light touch in 4 extremities    Cerebellar: There is no dysmetria on finger to nose testing.    Gait : deferred    NIH SS:  DATE: 11/30/23  TIME: 12:45 hrs   1A: Level of consciousness (0-3): 0  1B: Questions (0-2): 0  1C: Commands (0-2): 0  2: Gaze (0-2): 0  3: Visual fields (0-3): 0  4: Facial palsy (0-3): 1  MOTOR:  5A: Left arm motor drift (0-4): 0  5B: Right arm motor drift (0-4): 0  6A: Left leg motor drift (0-4): 0  6B: Right leg motor drift (0-4): 0  7: Limb ataxia (0-2): 0  SENSORY:  8: Sensation (0-2): 0  SPEECH:  9: Language (0-3): 0  10: Dysarthria (0-2): 0  EXTINCTION:  11: Extinction/inattention (0-2): 0    TOTAL SCORE: 1    prehospital mRS=3      LABS:                         12.1   6.52  )-----------( 141      ( 29 Nov 2023 14:11 )             35.5       11-30    140  |  106  |  35.2<H>  ----------------------------<  98  4.7   |  25.0  |  1.00    Ca    8.8      30 Nov 2023 06:39    TPro  6.5<L>  /  Alb  4.1  /  TBili  1.2  /  DBili  x   /  AST  16  /  ALT  8   /  AlkPhos  79  11-29      PT/INR - ( 29 Nov 2023 14:11 )   PT: 18.6 sec;   INR: 1.70 ratio         PTT - ( 29 Nov 2023 14:11 )  PTT:38.3 sec    Lipid panel: Lipid Profile (11.30.23 @ 06:39)    Cholesterol: 119 mg/dL   Triglycerides, Serum: 98 mg/dL   HDL Cholesterol: 30 mg/dL   Non HDL Cholesterol: 89   LDL Cholesterol Calculated: 69 mg/dL    A1C with Estimated Average Glucose (11.30.23 @ 06:39)    A1C with Estimated Average Glucose Result: 5.7 %   Estimated Average Glucose: 117 mg/dL    RADIOLOGY & ADDITIONAL STUDIES (independently reviewed unless otherwise noted):    MR Head No Cont (11.30.23 @ 02:35)   IMPRESSION:  No acute or subacute stroke.    Nonspecific 8mm soft tissue mass within the left intraconal space again   seen, may represent a cavernous hemangioma. Follow-up MRI of the orbits   can be obtained in 6-12 months to ensure stability.    Probable small intracranial meningiomas without significant mass effect.    CT Brain, CTA Head, CTA Neck w/ IV Cont, CT Perfusion, Stroke Protocol (11.29.23 @ 14:15)   IMPRESSION:  CT brain:  No hydrocephalus, acute intracranial hemorrhage, mass effect, or brain   edema.  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  Small multifocal probable meningiomas.    CT brain perfusion:  No perfusion abnormality.    CTA brain:  No flow-limiting stenosis or vascular aneurysm. No AVM.  Venous system is well opacified, no evidence for venous sinus or cortical   vein thrombosis.    CTA neck:  No flow-limiting stenosis, evidence for arterial dissection, or vascular   aneurysm.                                  [facility] Stroke Team Consult note    CC: L facial droop   HPI:  A 89 yo F with pmh of Afib on Eliquis, HTN, HLD, chronic CHF was sent by MD c/o bruising on LUE and L facial drop. Per patient, she was doing well and did not see any asymmetry in her face. Went to see her PCP and PCP's PA earlier today and was noted with left sided facial droop and came to the ED for further care. Denies vision changes, no prior TIA/CVA, weakness. Of note, had a fall and landed on her left elbow a week ago, noted with bruising with no limitations in motions.   ED vitals and labs stable, CTH and angio negative for acute pathologies, admitted for CVA/TIA workup.     PAST MEDICAL & SURGICAL HISTORY:  Mild HTN  Chronic atrial fibrillation  HLD (hyperlipidemia)  Chronic CHF  History of left mastectomy    MEDICATIONS  (STANDING):  apixaban 2.5 milliGRAM(s) Oral two times a day  aspirin  chewable 81 milliGRAM(s) Oral daily  atorvastatin 80 milliGRAM(s) Oral at bedtime  cyanocobalamin 1000 MICROGram(s) Oral daily  diltiazem    milliGRAM(s) Oral daily  influenza  Vaccine (HIGH DOSE) 0.7 milliLiter(s) IntraMuscular once  metoprolol succinate ER 50 milliGRAM(s) Oral two times a day    MEDICATIONS  (PRN):  acetaminophen     Tablet .. 650 milliGRAM(s) Oral every 6 hours PRN Temp greater or equal to 38C (100.4F), Mild Pain (1 - 3)  aluminum hydroxide/magnesium hydroxide/simethicone Suspension 30 milliLiter(s) Oral every 4 hours PRN Dyspepsia  melatonin 3 milliGRAM(s) Oral at bedtime PRN Insomnia  metoprolol tartrate Injectable 5 milliGRAM(s) IV Push every 6 hours PRN persistent HR > 130  ondansetron Injectable 4 milliGRAM(s) IV Push every 8 hours PRN Nausea and/or Vomiting      Allergies  No Known Allergies    Intolerances    SOCIAL HISTORY:  no tob,   no alcohol   no drugs    FAMILY HISTORY:  Family history of heart disease (Father)    ROS: 14 point ROS negative other than what is present in HPI or below    Vital Signs Last 24 Hrs  T(C): 36.3 (30 Nov 2023 08:29), Max: 36.9 (29 Nov 2023 20:07)  T(F): 97.4 (30 Nov 2023 08:29), Max: 98.4 (29 Nov 2023 20:07)  HR: 100 (30 Nov 2023 08:29) (78 - 138)  BP: 133/87 (30 Nov 2023 08:29) (107/69 - 139/90)  BP(mean): --  RR: 18 (30 Nov 2023 08:29) (17 - 18)  SpO2: 96% (30 Nov 2023 08:29) (93% - 98%)    Parameters below as of 30 Nov 2023 08:29  Patient On (Oxygen Delivery Method): room air    General: NAD    Detailed Neurologic Exam:    Mental status: The patient is awake and alert and has normal attention span.  The patient is fully oriented in 3 spheres. The patient is oriented to current events. The patient is able to name objects, follow commands, repeat sentences. Mild stutter noted on exam.     Cranial nerves: Pupils equal and react symmetrically to light. There is no visual field deficit to confrontation. Extraocular motion is full with no nystagmus. There is no ptosis. Facial sensation is intact. Slight left facial droop noted which corrects with smile. Palate elevates symmetrically. Tongue is midline.    Motor: There is normal bulk and tone.  There is no tremor.  Strength is 5/5 in the right arm and leg.   Strength is 5/5 in the left arm and leg.    Sensation: Intact to light touch in 4 extremities    Cerebellar: There is no dysmetria on finger to nose testing.    Gait : deferred    [facility] SS:  DATE: 11/30/23  TIME: 12:45 hrs   1A: Level of consciousness (0-3): 0  1B: Questions (0-2): 0  1C: Commands (0-2): 0  2: Gaze (0-2): 0  3: Visual fields (0-3): 0  4: Facial palsy (0-3): 1  MOTOR:  5A: Left arm motor drift (0-4): 0  5B: Right arm motor drift (0-4): 0  6A: Left leg motor drift (0-4): 0  6B: Right leg motor drift (0-4): 0  7: Limb ataxia (0-2): 0  SENSORY:  8: Sensation (0-2): 0  SPEECH:  9: Language (0-3): 0  10: Dysarthria (0-2): 0  EXTINCTION:  11: Extinction/inattention (0-2): 0    TOTAL SCORE: 1    prehospital mRS=3      LABS:                         12.1   6.52  )-----------( 141      ( 29 Nov 2023 14:11 )             35.5       11-30    140  |  106  |  35.2<H>  ----------------------------<  98  4.7   |  25.0  |  1.00    Ca    8.8      30 Nov 2023 06:39    TPro  6.5<L>  /  Alb  4.1  /  TBili  1.2  /  DBili  x   /  AST  16  /  ALT  8   /  AlkPhos  79  11-29      PT/INR - ( 29 Nov 2023 14:11 )   PT: 18.6 sec;   INR: 1.70 ratio         PTT - ( 29 Nov 2023 14:11 )  PTT:38.3 sec    Lipid panel: Lipid Profile (11.30.23 @ 06:39)    Cholesterol: 119 mg/dL   Triglycerides, Serum: 98 mg/dL   HDL Cholesterol: 30 mg/dL   Non HDL Cholesterol: 89   LDL Cholesterol Calculated: 69 mg/dL    A1C with Estimated Average Glucose (11.30.23 @ 06:39)    A1C with Estimated Average Glucose Result: 5.7 %   Estimated Average Glucose: 117 mg/dL    RADIOLOGY & ADDITIONAL STUDIES (independently reviewed unless otherwise noted):    MR Head No Cont (11.30.23 @ 02:35)   IMPRESSION:  No acute or subacute stroke.    Nonspecific 8mm soft tissue mass within the left intraconal space again   seen, may represent a cavernous hemangioma. Follow-up MRI of the orbits   can be obtained in 6-12 months to ensure stability.    Probable small intracranial meningiomas without significant mass effect.    CT Brain, CTA Head, CTA Neck w/ IV Cont, CT Perfusion, Stroke Protocol (11.29.23 @ 14:15)   IMPRESSION:  CT brain:  No hydrocephalus, acute intracranial hemorrhage, mass effect, or brain   edema.  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  Small multifocal probable meningiomas.    CT brain perfusion:  No perfusion abnormality.    CTA brain:  No flow-limiting stenosis or vascular aneurysm. No AVM.  Venous system is well opacified, no evidence for venous sinus or cortical   vein thrombosis.    CTA neck:  No flow-limiting stenosis, evidence for arterial dissection, or vascular   aneurysm.

## 2023-11-30 NOTE — DISCHARGE NOTE PROVIDER - NSDCMRMEDTOKEN_GEN_ALL_CORE_FT
atorvastatin 20 mg oral tablet: 1 tab(s) orally once a day  cyanocobalamin 1000 mcg oral tablet: 1 tab(s) orally once a day  DilTIAZem (Eqv-Cardizem CD) 240 mg/24 hours oral capsule, extended release: 1 cap(s) orally once a day  Eliquis 2.5 mg oral tablet: 1 tab(s) orally 2 times a day  furosemide 40 mg oral tablet: 0.5 tab(s) orally every other day  ipratropium 42 mcg/inh (0.06%) nasal spray: 1 spray(s) intranasally 2 times a day  metoprolol succinate 50 mg oral tablet, extended release: 1 tab(s) orally 2 times a day  Potassium Chloride (Eqv-Klor-Con 10) 10 mEq oral tablet, extended release: 1 tab(s) orally once a day

## 2023-12-07 ENCOUNTER — NON-APPOINTMENT (OUTPATIENT)
Age: 88
End: 2023-12-07

## 2023-12-07 ENCOUNTER — APPOINTMENT (OUTPATIENT)
Dept: OPHTHALMOLOGY | Facility: CLINIC | Age: 88
End: 2023-12-07
Payer: MEDICARE

## 2023-12-07 PROCEDURE — 92014 COMPRE OPH EXAM EST PT 1/>: CPT

## 2023-12-07 PROCEDURE — 92134 CPTRZ OPH DX IMG PST SGM RTA: CPT

## 2024-02-01 ENCOUNTER — NON-APPOINTMENT (OUTPATIENT)
Age: 89
End: 2024-02-01

## 2024-02-01 ENCOUNTER — APPOINTMENT (OUTPATIENT)
Dept: OPHTHALMOLOGY | Facility: CLINIC | Age: 89
End: 2024-02-01
Payer: MEDICARE

## 2024-02-01 PROCEDURE — 92134 CPTRZ OPH DX IMG PST SGM RTA: CPT

## 2024-02-01 PROCEDURE — 92014 COMPRE OPH EXAM EST PT 1/>: CPT

## 2024-03-07 ENCOUNTER — NON-APPOINTMENT (OUTPATIENT)
Age: 89
End: 2024-03-07

## 2024-03-07 ENCOUNTER — APPOINTMENT (OUTPATIENT)
Dept: OPHTHALMOLOGY | Facility: CLINIC | Age: 89
End: 2024-03-07
Payer: MEDICARE

## 2024-03-07 PROCEDURE — 99213 OFFICE O/P EST LOW 20 MIN: CPT

## 2024-03-07 PROCEDURE — 92134 CPTRZ OPH DX IMG PST SGM RTA: CPT

## 2024-04-02 ENCOUNTER — APPOINTMENT (OUTPATIENT)
Dept: OPHTHALMOLOGY | Facility: CLINIC | Age: 89
End: 2024-04-02
Payer: SELF-PAY

## 2024-04-02 ENCOUNTER — NON-APPOINTMENT (OUTPATIENT)
Age: 89
End: 2024-04-02

## 2024-04-02 PROCEDURE — 99199 UNLISTED SPECIAL SVC PX/RPRT: CPT | Mod: NC

## 2024-04-04 ENCOUNTER — NON-APPOINTMENT (OUTPATIENT)
Age: 89
End: 2024-04-04

## 2024-04-04 ENCOUNTER — APPOINTMENT (OUTPATIENT)
Dept: OPHTHALMOLOGY | Facility: CLINIC | Age: 89
End: 2024-04-04
Payer: MEDICARE

## 2024-04-04 PROCEDURE — 99213 OFFICE O/P EST LOW 20 MIN: CPT

## 2024-04-04 PROCEDURE — 92134 CPTRZ OPH DX IMG PST SGM RTA: CPT

## 2024-05-16 ENCOUNTER — APPOINTMENT (OUTPATIENT)
Dept: OPHTHALMOLOGY | Facility: CLINIC | Age: 89
End: 2024-05-16

## 2024-05-18 ENCOUNTER — NON-APPOINTMENT (OUTPATIENT)
Age: 89
End: 2024-05-18

## 2024-05-18 ENCOUNTER — APPOINTMENT (OUTPATIENT)
Dept: OPHTHALMOLOGY | Facility: CLINIC | Age: 89
End: 2024-05-18
Payer: MEDICARE

## 2024-05-18 PROCEDURE — 99213 OFFICE O/P EST LOW 20 MIN: CPT

## 2024-05-18 PROCEDURE — 92134 CPTRZ OPH DX IMG PST SGM RTA: CPT

## 2024-06-18 ENCOUNTER — EMERGENCY (EMERGENCY)
Facility: HOSPITAL | Age: 89
LOS: 1 days | Discharge: HOME | End: 2024-06-18
Attending: STUDENT IN AN ORGANIZED HEALTH CARE EDUCATION/TRAINING PROGRAM
Payer: MEDICARE

## 2024-06-18 VITALS
OXYGEN SATURATION: 96 % | RESPIRATION RATE: 18 BRPM | DIASTOLIC BLOOD PRESSURE: 92 MMHG | TEMPERATURE: 98 F | SYSTOLIC BLOOD PRESSURE: 138 MMHG | HEART RATE: 88 BPM

## 2024-06-18 VITALS
WEIGHT: 139.99 LBS | HEART RATE: 77 BPM | SYSTOLIC BLOOD PRESSURE: 115 MMHG | OXYGEN SATURATION: 94 % | DIASTOLIC BLOOD PRESSURE: 73 MMHG | TEMPERATURE: 99 F | RESPIRATION RATE: 18 BRPM

## 2024-06-18 DIAGNOSIS — Z90.12 ACQUIRED ABSENCE OF LEFT BREAST AND NIPPLE: Chronic | ICD-10-CM

## 2024-06-18 PROCEDURE — 99284 EMERGENCY DEPT VISIT MOD MDM: CPT | Mod: 25

## 2024-06-18 PROCEDURE — 70486 CT MAXILLOFACIAL W/O DYE: CPT | Mod: MC

## 2024-06-18 PROCEDURE — 72125 CT NECK SPINE W/O DYE: CPT | Mod: MC

## 2024-06-18 PROCEDURE — 93010 ELECTROCARDIOGRAM REPORT: CPT

## 2024-06-18 PROCEDURE — 93005 ELECTROCARDIOGRAM TRACING: CPT

## 2024-06-18 PROCEDURE — 70486 CT MAXILLOFACIAL W/O DYE: CPT | Mod: 26,MC

## 2024-06-18 PROCEDURE — 70450 CT HEAD/BRAIN W/O DYE: CPT | Mod: MC

## 2024-06-18 PROCEDURE — 72125 CT NECK SPINE W/O DYE: CPT | Mod: 26,MC

## 2024-06-18 PROCEDURE — 70450 CT HEAD/BRAIN W/O DYE: CPT | Mod: 26,MC

## 2024-06-18 PROCEDURE — 96374 THER/PROPH/DIAG INJ IV PUSH: CPT

## 2024-06-18 PROCEDURE — 99285 EMERGENCY DEPT VISIT HI MDM: CPT

## 2024-06-18 RX ORDER — METOPROLOL TARTRATE 50 MG
50 TABLET ORAL ONCE
Refills: 0 | Status: COMPLETED | OUTPATIENT
Start: 2024-06-18 | End: 2024-06-18

## 2024-06-18 RX ORDER — METOPROLOL TARTRATE 50 MG
2.5 TABLET ORAL ONCE
Refills: 0 | Status: COMPLETED | OUTPATIENT
Start: 2024-06-18 | End: 2024-06-18

## 2024-06-18 RX ADMIN — Medication 50 MILLIGRAM(S): at 22:38

## 2024-06-18 RX ADMIN — Medication 2.5 MILLIGRAM(S): at 22:16

## 2024-06-18 NOTE — ED ADULT NURSE NOTE - OBJECTIVE STATEMENT
Pt presents to ED because she fell at the [facility] assisted living in her room and hit her head on the floor. Pt is A&O x3,  c.o pain to head 5/10. swelling and bruise over L eyebrow. Pt on Eliquis for afib. Pt sitting up in bed at this time with daughter at bedside. NAD.

## 2024-06-18 NOTE — ED ADULT TRIAGE NOTE - CHIEF COMPLAINT QUOTE
BIBEMS from [facility] s/p mechanical trip and fall form standing height. Pt states that she hit her head on a wooden box on the floor, denies LOC. On Eliquis for atrial fibrillation. Ecchymosis and swelling noted to left eyebrow. MD called for evaluation, pt taken directly to CT scan.

## 2024-06-18 NOTE — ED ADULT NURSE REASSESSMENT NOTE - NS ED NURSE REASSESS COMMENT FT1
approx 2200 pt resting in bed EMS at bedside  , pt denies any symptoms reports she normally takes PM meds with dinner and did not get them today, A/ox3 rr even and unlabored,   MD made aware- EKG completed per order and pt medicated per order. Assessment ongoing   safety maintained

## 2024-06-18 NOTE — ED PROVIDER NOTE - ATTENDING CONTRIBUTION TO CARE
I personally saw the patient with the resident, and completed the key components of the history and physical exam. I then discussed the management plan with the resident.    89 y/o F with A fib on Eliquis presents s/p mechanical fall, no LOC, left perioorbital hematoma. Neuro intact.    I agree with exam as documented.    Patient ambulated, stable for discharge.

## 2024-06-18 NOTE — ED PROVIDER NOTE - CLINICAL SUMMARY MEDICAL DECISION MAKING FREE TEXT BOX
89-year-old female with past medical history including A-fib on Eliquis, hypertension, hyperlipidemia presenting for mechanical fall. Will get priority scans and reassess. 89-year-old female with past medical history including A-fib on Eliquis, hypertension, hyperlipidemia presenting for mechanical fall. Will get priority scans and reassess.    Patient received in signout from Dr. Kenney awaiting transport back to her facility.  However on presentation of the ambulance crew patient was noted to be tachycardic in the 130s.  Repeat EKG indicates A-fib which patient has a known chronic history of.  Patient has been here for several hours and self tachycardia likely secondary to her limited rate control due to missing her evening meds.  Will re-medicate and reevaluate patient. 89-year-old female with past medical history including A-fib on Eliquis, hypertension, hyperlipidemia presenting for mechanical fall. Will get priority scans and reassess.    Patient received in sign-out from Dr. Kenney awaiting transport back to her facility.  However on presentation of the ambulance crew patient was noted to be tachycardic in the 130s.  Repeat EKG indicates A-fib which patient has a known chronic history of.  Patient has been here for several hours and self tachycardia likely secondary to her limited rate control due to missing her evening meds.  Will re-medicate and reevaluate patient.    Patient's heart rate has improved. Vitals are otherwise stable.

## 2024-06-18 NOTE — ED ADULT NURSE NOTE - CHIEF COMPLAINT QUOTE
BIBEMS from [facility] s/p mechanical trip and fall form standing height. Pt states that she hit her head on a wooden box on the floor, denies LOC. On Eliquis for atrial fibrillation. Ecchymosis and swelling noted to left eyebrow. MD called for evaluation, pt taken directly to CT scan.

## 2024-06-18 NOTE — ED PROVIDER NOTE - NSFOLLOWUPINSTRUCTIONS_ED_ALL_ED_FT
- Continue your regular medications as prescribed.    - You may take tylenol 1000 mg every 8 hours as needed for pain.    - Ice your eye as often as every 20 minutes for swelling.

## 2024-06-18 NOTE — ED PROVIDER NOTE - OBJECTIVE STATEMENT
89-year-old female with past medical history including A-fib on Eliquis, hypertension, hyperlipidemia presenting for mechanical fall.  Patient is supposed to be using a walker but stood up to walk 5 ft to the phone without her walker.  He tripped over her foot which is why she needs the walker.  Hit the left forehead on the ground.  Denying any loss of consciousness, nausea, vomiting, neck pain, chest pain, shortness of breath, abdominal pain, chest wall pain, pain to extremities, hip pain, leg pain, fever, chills, cough, congestion, polyuria.

## 2024-06-18 NOTE — ED PROVIDER NOTE - PHYSICAL EXAMINATION
General: well appearing, NAD  Head: NC,  hematoma to the left lateral forehead 4 x 4 cm.  EENT: EOMI, no scleral icterus  Cardiac: RRR, no apparent murmurs, no lower extremity edema  Respiratory: CTABL, no respiratory distress   Abdomen: soft, ND, NT, nonperitonitic  MSK/Vascular: full ROM, soft compartments, warm extremities,   No CTL tenderness, chest wall tenderness, abdominal tenderness, pain to extremities x 4  Neuro: AAOx3, sensation to light touch intact  Psych: calm, cooperative

## 2024-06-18 NOTE — ED PROVIDER NOTE - PATIENT PORTAL LINK FT
You can access the FollowMyHealth Patient Portal offered by [facility] by registering at the following website: [email or web address]. By joining [email or web address]’s FollowMyHealth portal, you will also be able to view your health information using other applications (apps) compatible with our system.

## 2024-06-18 NOTE — ED PROVIDER NOTE - PROGRESS NOTE DETAILS
Jazmyne Kenney, DO: Patient ambulated well with Dr. Christian and I (usually uses walker). Comfortable with discharge, will arrange transportation back.

## 2024-06-29 ENCOUNTER — NON-APPOINTMENT (OUTPATIENT)
Age: 89
End: 2024-06-29

## 2024-06-29 ENCOUNTER — APPOINTMENT (OUTPATIENT)
Dept: OPHTHALMOLOGY | Facility: CLINIC | Age: 89
End: 2024-06-29
Payer: MEDICARE

## 2024-06-29 PROCEDURE — 99213 OFFICE O/P EST LOW 20 MIN: CPT

## 2024-06-29 PROCEDURE — 92134 CPTRZ OPH DX IMG PST SGM RTA: CPT

## 2024-08-03 ENCOUNTER — APPOINTMENT (OUTPATIENT)
Dept: OPHTHALMOLOGY | Facility: CLINIC | Age: 89
End: 2024-08-03
Payer: MEDICARE

## 2024-08-03 ENCOUNTER — NON-APPOINTMENT (OUTPATIENT)
Age: 89
End: 2024-08-03

## 2024-08-03 PROCEDURE — 92134 CPTRZ OPH DX IMG PST SGM RTA: CPT

## 2024-08-03 PROCEDURE — 67028 INJECTION EYE DRUG: CPT | Mod: LT

## 2024-08-05 ENCOUNTER — NON-APPOINTMENT (OUTPATIENT)
Age: 89
End: 2024-08-05

## 2024-08-27 NOTE — PATIENT PROFILE ADULT - FUNCTIONAL ASSESSMENT - DAILY ACTIVITY 6.
"Name: Ravi Cook      : 1980      MRN: [id number]  Encounter Provider: Anmol Lynch Anmol Lynch MD   [facility] PRIMARY CARE [facility]     3 = A little assistance

## 2024-09-29 ENCOUNTER — INPATIENT (INPATIENT)
Facility: HOSPITAL | Age: 89
LOS: 8 days | Discharge: NURSING, REHAB OR LONG-TERM CARE | DRG: 558 | End: 2024-10-08
Attending: STUDENT IN AN ORGANIZED HEALTH CARE EDUCATION/TRAINING PROGRAM | Admitting: STUDENT IN AN ORGANIZED HEALTH CARE EDUCATION/TRAINING PROGRAM
Payer: MEDICARE

## 2024-09-29 VITALS
HEART RATE: 91 BPM | DIASTOLIC BLOOD PRESSURE: 90 MMHG | WEIGHT: 132.06 LBS | SYSTOLIC BLOOD PRESSURE: 140 MMHG | HEIGHT: 63 IN | RESPIRATION RATE: 20 BRPM | OXYGEN SATURATION: 95 % | TEMPERATURE: 98 F

## 2024-09-29 DIAGNOSIS — Z90.12 ACQUIRED ABSENCE OF LEFT BREAST AND NIPPLE: Chronic | ICD-10-CM

## 2024-09-29 LAB
ALBUMIN SERPL ELPH-MCNC: 4.1 G/DL — SIGNIFICANT CHANGE UP (ref 3.3–5.2)
ALP SERPL-CCNC: 98 U/L — SIGNIFICANT CHANGE UP (ref 40–120)
ALT FLD-CCNC: 12 U/L — SIGNIFICANT CHANGE UP
ANION GAP SERPL CALC-SCNC: 15 MMOL/L — SIGNIFICANT CHANGE UP (ref 5–17)
APTT BLD: 30.7 SEC — SIGNIFICANT CHANGE UP (ref 24.5–35.6)
AST SERPL-CCNC: 18 U/L — SIGNIFICANT CHANGE UP
BASOPHILS # BLD AUTO: 0.02 K/UL — SIGNIFICANT CHANGE UP (ref 0–0.2)
BASOPHILS NFR BLD AUTO: 0.2 % — SIGNIFICANT CHANGE UP (ref 0–2)
BILIRUB SERPL-MCNC: 1.4 MG/DL — SIGNIFICANT CHANGE UP (ref 0.4–2)
BLD GP AB SCN SERPL QL: SIGNIFICANT CHANGE UP
BUN SERPL-MCNC: 30.9 MG/DL — HIGH (ref 8–20)
CALCIUM SERPL-MCNC: 8.7 MG/DL — SIGNIFICANT CHANGE UP (ref 8.4–10.5)
CHLORIDE SERPL-SCNC: 104 MMOL/L — SIGNIFICANT CHANGE UP (ref 96–108)
CK MB CFR SERPL CALC: 13 NG/ML — HIGH (ref 0–6.7)
CK MB CFR SERPL CALC: 15.7 NG/ML — HIGH (ref 0–6.7)
CK SERPL-CCNC: 660 U/L — HIGH (ref 25–170)
CK SERPL-CCNC: 865 U/L — HIGH (ref 25–170)
CO2 SERPL-SCNC: 20 MMOL/L — LOW (ref 22–29)
CREAT SERPL-MCNC: 0.94 MG/DL — SIGNIFICANT CHANGE UP (ref 0.5–1.3)
EGFR: 57 ML/MIN/1.73M2 — LOW
EOSINOPHIL # BLD AUTO: 0 K/UL — SIGNIFICANT CHANGE UP (ref 0–0.5)
EOSINOPHIL NFR BLD AUTO: 0 % — SIGNIFICANT CHANGE UP (ref 0–6)
GLUCOSE SERPL-MCNC: 132 MG/DL — HIGH (ref 70–99)
HCT VFR BLD CALC: 36.1 % — SIGNIFICANT CHANGE UP (ref 34.5–45)
HGB BLD-MCNC: 12.4 G/DL — SIGNIFICANT CHANGE UP (ref 11.5–15.5)
IMM GRANULOCYTES NFR BLD AUTO: 1 % — HIGH (ref 0–0.9)
INR BLD: 1.61 RATIO — HIGH (ref 0.85–1.16)
LYMPHOCYTES # BLD AUTO: 0.63 K/UL — LOW (ref 1–3.3)
LYMPHOCYTES # BLD AUTO: 6.2 % — LOW (ref 13–44)
MCHC RBC-ENTMCNC: 29 PG — SIGNIFICANT CHANGE UP (ref 27–34)
MCHC RBC-ENTMCNC: 34.3 GM/DL — SIGNIFICANT CHANGE UP (ref 32–36)
MCV RBC AUTO: 84.5 FL — SIGNIFICANT CHANGE UP (ref 80–100)
MONOCYTES # BLD AUTO: 0.97 K/UL — HIGH (ref 0–0.9)
MONOCYTES NFR BLD AUTO: 9.5 % — SIGNIFICANT CHANGE UP (ref 2–14)
NEUTROPHILS # BLD AUTO: 8.47 K/UL — HIGH (ref 1.8–7.4)
NEUTROPHILS NFR BLD AUTO: 83.1 % — HIGH (ref 43–77)
PLATELET # BLD AUTO: 142 K/UL — LOW (ref 150–400)
POTASSIUM SERPL-MCNC: 4.2 MMOL/L — SIGNIFICANT CHANGE UP (ref 3.5–5.3)
POTASSIUM SERPL-SCNC: 4.2 MMOL/L — SIGNIFICANT CHANGE UP (ref 3.5–5.3)
PROT SERPL-MCNC: 6.2 G/DL — LOW (ref 6.6–8.7)
PROTHROM AB SERPL-ACNC: 18.6 SEC — HIGH (ref 9.9–13.4)
RBC # BLD: 4.27 M/UL — SIGNIFICANT CHANGE UP (ref 3.8–5.2)
RBC # FLD: 14.6 % — HIGH (ref 10.3–14.5)
SODIUM SERPL-SCNC: 139 MMOL/L — SIGNIFICANT CHANGE UP (ref 135–145)
WBC # BLD: 10.19 K/UL — SIGNIFICANT CHANGE UP (ref 3.8–10.5)
WBC # FLD AUTO: 10.19 K/UL — SIGNIFICANT CHANGE UP (ref 3.8–10.5)

## 2024-09-29 PROCEDURE — 73090 X-RAY EXAM OF FOREARM: CPT | Mod: 26,LT

## 2024-09-29 PROCEDURE — 93010 ELECTROCARDIOGRAM REPORT: CPT

## 2024-09-29 PROCEDURE — 73110 X-RAY EXAM OF WRIST: CPT | Mod: 26,RT,76

## 2024-09-29 PROCEDURE — 72125 CT NECK SPINE W/O DYE: CPT | Mod: 26,MC

## 2024-09-29 PROCEDURE — 99285 EMERGENCY DEPT VISIT HI MDM: CPT

## 2024-09-29 PROCEDURE — ZZZZZ: CPT

## 2024-09-29 PROCEDURE — 99223 1ST HOSP IP/OBS HIGH 75: CPT

## 2024-09-29 PROCEDURE — 71260 CT THORAX DX C+: CPT | Mod: 26,MC

## 2024-09-29 PROCEDURE — 70450 CT HEAD/BRAIN W/O DYE: CPT | Mod: 26,MC

## 2024-09-29 PROCEDURE — 74177 CT ABD & PELVIS W/CONTRAST: CPT | Mod: 26,MC

## 2024-09-29 RX ORDER — DILTIAZEM HCL 300 MG
240 CAPSULE, EXTENDED RELEASE 24HR ORAL ONCE
Refills: 0 | Status: COMPLETED | OUTPATIENT
Start: 2024-09-29 | End: 2024-09-29

## 2024-09-29 RX ORDER — METOPROLOL TARTRATE 50 MG
50 TABLET ORAL ONCE
Refills: 0 | Status: COMPLETED | OUTPATIENT
Start: 2024-09-29 | End: 2024-09-29

## 2024-09-29 RX ORDER — SODIUM CHLORIDE IRRIG SOLUTION 0.9 %
1000 SOLUTION, IRRIGATION IRRIGATION ONCE
Refills: 0 | Status: COMPLETED | OUTPATIENT
Start: 2024-09-29 | End: 2024-09-29

## 2024-09-29 RX ORDER — INFLUENZA VIRUS VACCINE 15; 15; 15; 15 UG/.5ML; UG/.5ML; UG/.5ML; UG/.5ML
0.5 SUSPENSION INTRAMUSCULAR ONCE
Refills: 0 | Status: DISCONTINUED | OUTPATIENT
Start: 2024-09-29 | End: 2024-10-08

## 2024-09-29 RX ORDER — APIXABAN 5 MG/1
2.5 TABLET, FILM COATED ORAL ONCE
Refills: 0 | Status: COMPLETED | OUTPATIENT
Start: 2024-09-29 | End: 2024-09-29

## 2024-09-29 RX ORDER — AZITHROMYCIN 250 MG/1
500 TABLET, FILM COATED ORAL ONCE
Refills: 0 | Status: COMPLETED | OUTPATIENT
Start: 2024-09-29 | End: 2024-09-29

## 2024-09-29 RX ORDER — CYANOCOBALAMIN (VITAMIN B-12) 1000MCG/ML
1000 VIAL (ML) INJECTION DAILY
Refills: 0 | Status: DISCONTINUED | OUTPATIENT
Start: 2024-09-29 | End: 2024-10-08

## 2024-09-29 RX ORDER — AZITHROMYCIN 250 MG/1
500 TABLET, FILM COATED ORAL EVERY 24 HOURS
Refills: 0 | Status: DISCONTINUED | OUTPATIENT
Start: 2024-09-30 | End: 2024-10-03

## 2024-09-29 RX ORDER — TRAMADOL HYDROCHLORIDE 50 MG/1
25 TABLET, COATED ORAL ONCE
Refills: 0 | Status: DISCONTINUED | OUTPATIENT
Start: 2024-09-29 | End: 2024-09-29

## 2024-09-29 RX ORDER — SODIUM CHLORIDE 0.9 % (FLUSH) 0.9 %
1000 SYRINGE (ML) INJECTION
Refills: 0 | Status: DISCONTINUED | OUTPATIENT
Start: 2024-09-29 | End: 2024-09-30

## 2024-09-29 RX ORDER — CEFTRIAXONE SODIUM 1 G
1000 VIAL (EA) INJECTION ONCE
Refills: 0 | Status: COMPLETED | OUTPATIENT
Start: 2024-09-29 | End: 2024-09-29

## 2024-09-29 RX ORDER — DILTIAZEM HCL 300 MG
240 CAPSULE, EXTENDED RELEASE 24HR ORAL DAILY
Refills: 0 | Status: DISCONTINUED | OUTPATIENT
Start: 2024-09-29 | End: 2024-09-30

## 2024-09-29 RX ORDER — AZITHROMYCIN 250 MG/1
TABLET, FILM COATED ORAL
Refills: 0 | Status: DISCONTINUED | OUTPATIENT
Start: 2024-09-29 | End: 2024-10-03

## 2024-09-29 RX ORDER — ACETAMINOPHEN 325 MG
1000 TABLET ORAL ONCE
Refills: 0 | Status: COMPLETED | OUTPATIENT
Start: 2024-09-29 | End: 2024-09-29

## 2024-09-29 RX ORDER — ATORVASTATIN CALCIUM 10 MG/1
20 TABLET, FILM COATED ORAL AT BEDTIME
Refills: 0 | Status: DISCONTINUED | OUTPATIENT
Start: 2024-09-29 | End: 2024-10-08

## 2024-09-29 RX ORDER — MORPHINE SULFATE 30 MG/1
2 TABLET, FILM COATED, EXTENDED RELEASE ORAL ONCE
Refills: 0 | Status: DISCONTINUED | OUTPATIENT
Start: 2024-09-29 | End: 2024-09-29

## 2024-09-29 RX ORDER — CEFTRIAXONE SODIUM 1 G
1000 VIAL (EA) INJECTION ONCE
Refills: 0 | Status: DISCONTINUED | OUTPATIENT
Start: 2024-09-29 | End: 2024-09-29

## 2024-09-29 RX ORDER — TETANUS TOXOID, REDUCED DIPHTHERIA TOXOID AND ACELLULAR PERTUSSIS VACCINE, ADSORBED 5; 2.5; 8; 8; 2.5 [IU]/.5ML; [IU]/.5ML; UG/.5ML; UG/.5ML; UG/.5ML
0.5 SUSPENSION INTRAMUSCULAR ONCE
Refills: 0 | Status: COMPLETED | OUTPATIENT
Start: 2024-09-29 | End: 2024-09-29

## 2024-09-29 RX ORDER — METOPROLOL TARTRATE 50 MG
50 TABLET ORAL
Refills: 0 | Status: DISCONTINUED | OUTPATIENT
Start: 2024-09-29 | End: 2024-09-30

## 2024-09-29 RX ORDER — SODIUM CHLORIDE IRRIG SOLUTION 0.9 %
500 SOLUTION, IRRIGATION IRRIGATION ONCE
Refills: 0 | Status: COMPLETED | OUTPATIENT
Start: 2024-09-29 | End: 2024-09-29

## 2024-09-29 RX ADMIN — Medication 50 MILLIGRAM(S): at 14:33

## 2024-09-29 RX ADMIN — Medication 240 MILLIGRAM(S): at 14:34

## 2024-09-29 RX ADMIN — Medication 1000 MILLIGRAM(S): at 15:27

## 2024-09-29 RX ADMIN — Medication 1000 MILLILITER(S): at 15:56

## 2024-09-29 RX ADMIN — MORPHINE SULFATE 2 MILLIGRAM(S): 30 TABLET, FILM COATED, EXTENDED RELEASE ORAL at 15:27

## 2024-09-29 RX ADMIN — TETANUS TOXOID, REDUCED DIPHTHERIA TOXOID AND ACELLULAR PERTUSSIS VACCINE, ADSORBED 0.5 MILLILITER(S): 5; 2.5; 8; 8; 2.5 SUSPENSION INTRAMUSCULAR at 12:34

## 2024-09-29 RX ADMIN — Medication 400 MILLIGRAM(S): at 12:31

## 2024-09-29 RX ADMIN — TRAMADOL HYDROCHLORIDE 25 MILLIGRAM(S): 50 TABLET, COATED ORAL at 18:07

## 2024-09-29 RX ADMIN — Medication 1000 MILLILITER(S): at 12:31

## 2024-09-29 RX ADMIN — ATORVASTATIN CALCIUM 20 MILLIGRAM(S): 10 TABLET, FILM COATED ORAL at 18:56

## 2024-09-29 RX ADMIN — Medication 240 MILLIGRAM(S): at 18:56

## 2024-09-29 RX ADMIN — Medication 50 MILLIGRAM(S): at 18:07

## 2024-09-29 RX ADMIN — Medication 60 MILLILITER(S): at 18:08

## 2024-09-29 RX ADMIN — AZITHROMYCIN 255 MILLIGRAM(S): 250 TABLET, FILM COATED ORAL at 18:07

## 2024-09-29 RX ADMIN — APIXABAN 2.5 MILLIGRAM(S): 5 TABLET, FILM COATED ORAL at 18:06

## 2024-09-29 RX ADMIN — AZITHROMYCIN 255 MILLIGRAM(S): 250 TABLET, FILM COATED ORAL at 15:27

## 2024-09-29 NOTE — CONSULT NOTE ADULT - NS ATTEND AMEND GEN_ALL_CORE FT
No reviewed.  Agree with t plan: f/u in my office in 1 week.  Will make a final treatment plan at that time.    Sincerely  Dr. Sims

## 2024-09-29 NOTE — H&P ADULT - ASSESSMENT
89 y/o F with PMH of Afib on Eliquis, HTN, HLD presented after fall admitted for possible pneumonia and rhabdomyolysis.     Mechanical Fall  - PT consulted in ED, rec JOSE    Rhabdomyolysis  -  -> 865  - IVF   - Monitor    Wrist Fracture  - Xray: Acute, comminuted, severely displaced, an intra-articular fracture of the distal right radius. No dislocation. Acute obliquely oriented moderately displaced fracture of the distal right ulna metaphysis. Improved alignment status post reduction.  - Ortho recs appreciated  - s/p reduction    Community Acquired Pneumonia  - CT chest: Right middle lobe focal consolidative opacity.  - Azithromycin 500mg daily x 3 days    Chronic Atrial Fibrillation  - Telemetry monitoring  - Toprol XL 50mg daily    HTN  - Cardizem 240mg daily    HLD  - Lipitor 20mg nightly    DVT ppx  - Eliquis 2.5mg BID 89 y/o F with PMH of Afib on Eliquis, HTN, HLD presented after fall admitted for possible pneumonia and rhabdomyolysis.     Mechanical Fall  - CT head: No acute intracranial hemorrhage or calvarial fracture.  - PT consulted in ED, rec JOSE    Rhabdomyolysis  -  -> 865  - IVF   - Monitor    Wrist Fracture  - Xray: Acute, comminuted, severely displaced, an intra-articular fracture of the distal right radius. No dislocation. Acute obliquely oriented moderately displaced fracture of the distal right ulna metaphysis. Improved alignment status post reduction.  - Ortho recs appreciated  - s/p reduction    Community Acquired Pneumonia  - CT chest: Right middle lobe focal consolidative opacity.  - Azithromycin 500mg daily x 3 days    Chronic Atrial Fibrillation  - Telemetry monitoring  - Toprol XL 50mg daily    HTN  - Cardizem 240mg daily    HLD  - Lipitor 20mg nightly    DVT ppx  - Eliquis 2.5mg BID

## 2024-09-29 NOTE — ED PROVIDER NOTE - CARE PLAN
1 Principal Discharge DX:	Rhabdomyolysis  Secondary Diagnosis:	Wrist fracture  Secondary Diagnosis:	Pneumonia  Secondary Diagnosis:	Breast nodule  Secondary Diagnosis:	Adrenal mass

## 2024-09-29 NOTE — H&P ADULT - HISTORY OF PRESENT ILLNESS
89 y/o F with PMH of Afib on Eliquis, HTN, HLD presented after fall.    89 y/o F with PMH of Afib on Eliquis, HTN, HLD presented after fall. The patient states she was walking when she slipped and fell. Reports hitting her head.

## 2024-09-29 NOTE — H&P ADULT - NSHPPHYSICALEXAM_GEN_ALL_CORE
Vital Signs Last 24 Hrs  T(F): 98.2 (29 Sep 2024 16:26), Max: 98.3 (29 Sep 2024 10:49)  HR: 100 (29 Sep 2024 16:26) (91 - 128)  BP: 117/94 (29 Sep 2024 16:26) (117/94 - 140/90)  RR: 20 (29 Sep 2024 16:26) (20 - 20)  SpO2: 98% (29 Sep 2024 16:26) (95% - 99%)    Physical Exam:  Constitutional: alert and oriented, in no acute distress   Neck: Soft and supple  Respiratory: Clear to auscultation bilaterally, no wheezes or crackles  Cardiovascular: Irregular rhythm  Gastrointestinal: Soft, non-tender to palpation, +bs  Vascular: 2+ peripheral pulses  Neurological: A/O x 3, no focal neurological deficits  Musculoskeletal: 5/5 strength b/l upper and lower extremities, no lower extremity edema bilaterally Vital Signs Last 24 Hrs  T(F): 98.2 (29 Sep 2024 16:26), Max: 98.3 (29 Sep 2024 10:49)  HR: 100 (29 Sep 2024 16:26) (91 - 128)  BP: 117/94 (29 Sep 2024 16:26) (117/94 - 140/90)  RR: 20 (29 Sep 2024 16:26) (20 - 20)  SpO2: 98% (29 Sep 2024 16:26) (95% - 99%)    Physical Exam:  Constitutional: alert and oriented, in no acute distress   Neck: Soft and supple  Respiratory: Clear to auscultation bilaterally, no wheezes or crackles  Cardiovascular: Irregular rhythm  Gastrointestinal: Soft, non-tender to palpation, +bs  Vascular: 2+ peripheral pulses  Neurological: A/O x 3, no focal neurological deficits  Musculoskeletal: Right wrist in splint

## 2024-09-29 NOTE — ED ADULT NURSE NOTE - ED STAT RN HANDOFF DETAILS
Patient A&Ox3 resting in bed, placed on telemonitoring box, vitals stable,  NAD noted at this time. R. arm in splint., c/o pain rated 5/10, medications given as per orders, Patient getting admitted for R. radial fracture and Pneumonia. hand off report given to MAXWELL Leija

## 2024-09-29 NOTE — CONSULT NOTE ADULT - SUBJECTIVE AND OBJECTIVE BOX
Pt Name: BRITANY GALAN    MRN: [id number]      Patient is a 89y Female presenting to the emergency department with a chief complaint of right wrist pain s/p fall last night while walking to the bathroom. She was found early this morning and brought to ED by ambulance. She c/o right wrist pain and some bleeding from wounds on upper arm. RHD. No numbness or tingling in extremity. No elbow or shoulder pain. No other complaints. On eliquis for Afib.    HEALTH ISSUES - PROBLEM Dx:  right distal radius and ulna fxs      REVIEW OF SYSTEMS    General:	No fevers or chills    Skin/Breast: +wound upper right arm, +bruising    Respiratory and Thorax: No CP. +cough  	  Cardiovascular:	No SOB    Gastrointestinal:	No abdominal pain    Musculoskeletal:	 See HPI    Neurological:	See HPI    ROS is otherwise negative.    PAST MEDICAL & SURGICAL HISTORY:  PAST MEDICAL & SURGICAL HISTORY:  Mild HTN      Chronic atrial fibrillation      HLD (hyperlipidemia)      Chronic CHF      History of left mastectomy          Allergies: No Known Allergies      Medications:     FAMILY HISTORY:  Family history of heart disease (Father)    : non-contributory    Social History:     Ambulation: Walking independently [X] With Cane [ ] With Walker [ ]  Bedbound [ ]                           12.4   10.19 )-----------( 142      ( 29 Sep 2024 12:40 )             36.1     09-29    139  |  104  |  30.9[H]  ----------------------------<  132[H]  4.2   |  20.0[L]  |  0.94    Ca    8.7      29 Sep 2024 12:40    TPro  6.2[L]  /  Alb  4.1  /  TBili  1.4  /  DBili  x   /  AST  18  /  ALT  12  /  AlkPhos  98  09-29      PHYSICAL EXAM:    Vital Signs Last 24 Hrs  T(C): 36.8 (29 Sep 2024 10:49), Max: 36.8 (29 Sep 2024 10:49)  T(F): 98.3 (29 Sep 2024 10:49), Max: 98.3 (29 Sep 2024 10:49)  HR: 91 (29 Sep 2024 10:49) (91 - 91)  BP: 140/90 (29 Sep 2024 10:49) (140/90 - 140/90)  BP(mean): --  RR: 20 (29 Sep 2024 10:49) (20 - 20)  SpO2: 95% (29 Sep 2024 10:49) (95% - 95%)    Parameters below as of 29 Sep 2024 10:49  Patient On (Oxygen Delivery Method): room air      Daily Height in cm: 160.02 (29 Sep 2024 10:49)    Daily     Appearance: Alert, responsive, in no acute distress.    Neurological: Sensation is grossly intact to light touch. 5/5 motor function of all extremities. No focal deficits or weaknesses found.    Skin: no rash on visible skin. +ecchymosis. +bleeding right upper arm    Vascular: 2+ distal pulses. Cap refill < 2 sec. No signs of venous insuffiency or stasis. No extremity ulcerations. No cyanosis.    Musculoskeletal:       Right Upper Extremity: +deformity wrist. +ecchymosis wrist and upper arm. Skin intact at wrist/forearm. +skin tear posterior upper arm. +mild swelling wrist. +TTP wrist. Elbow/shoulder NTTP. +ROM shoulder/elbow/fingers. Radial pulse 2+. Sensation intact. Brisk cap refill.    Imaging Studies:  Xray right wrist- +comminuted intra articular distal radius fx with dorsal angulation    A/P:  Pt is a  89y Female with right dorsally angulated distal radius fx    PLAN:   -consent obtained for closed reduction  -hematoma block 10cc 1% lidocaine given after sterile prep with chloroprep  -closed reduction right distal radius- see procedure note  -sugar tong splint applied   -post reduction xrays- good alignment  -splint care  -pain control  -sling for comfort  -elevate RUE  -f/u in office with Dr Sims this week. Call [facility] office for appointment  D/W Dr Sims Pt Name: BRITANY GALAN    MRN: [id number]      Patient is a 89y Female presenting to the emergency department with a chief complaint of right wrist pain s/p fall last night while walking to the bathroom. She was found early this morning and brought to ED by ambulance. She c/o right wrist pain and some bleeding from wounds on upper arm. RHD. No numbness or tingling in extremity. No elbow or shoulder pain. No other complaints. On eliquis for Afib.    HEALTH ISSUES - PROBLEM Dx:  right distal radius and ulna fxs      REVIEW OF SYSTEMS    General:	No fevers or chills    Skin/Breast: +wound upper right arm, +bruising    Respiratory and Thorax: No CP. +cough  	  Cardiovascular:	No SOB    Gastrointestinal:	No abdominal pain    Musculoskeletal:	 See HPI    Neurological:	See HPI    ROS is otherwise negative.    PAST MEDICAL & SURGICAL HISTORY:  PAST MEDICAL & SURGICAL HISTORY:  Mild HTN      Chronic atrial fibrillation      HLD (hyperlipidemia)      Chronic CHF      History of left mastectomy          Allergies: No Known Allergies      Medications:     FAMILY HISTORY:  Family history of heart disease (Father)    : non-contributory    Social History:     Ambulation: Walking independently [X] With Cane [ ] With Walker [ ]  Bedbound [ ]                           12.4   10.19 )-----------( 142      ( 29 Sep 2024 12:40 )             36.1     09-29    139  |  104  |  30.9[H]  ----------------------------<  132[H]  4.2   |  20.0[L]  |  0.94    Ca    8.7      29 Sep 2024 12:40    TPro  6.2[L]  /  Alb  4.1  /  TBili  1.4  /  DBili  x   /  AST  18  /  ALT  12  /  AlkPhos  98  09-29      PHYSICAL EXAM:    Vital Signs Last 24 Hrs  T(C): 36.8 (29 Sep 2024 10:49), Max: 36.8 (29 Sep 2024 10:49)  T(F): 98.3 (29 Sep 2024 10:49), Max: 98.3 (29 Sep 2024 10:49)  HR: 91 (29 Sep 2024 10:49) (91 - 91)  BP: 140/90 (29 Sep 2024 10:49) (140/90 - 140/90)  BP(mean): --  RR: 20 (29 Sep 2024 10:49) (20 - 20)  SpO2: 95% (29 Sep 2024 10:49) (95% - 95%)    Parameters below as of 29 Sep 2024 10:49  Patient On (Oxygen Delivery Method): room air      Daily Height in cm: 160.02 (29 Sep 2024 10:49)    Daily     Appearance: Alert, responsive, in no acute distress.    Neurological: Sensation is grossly intact to light touch. 5/5 motor function of all extremities. No focal deficits or weaknesses found.    Skin: no rash on visible skin. +ecchymosis. +bleeding right upper arm    Vascular: 2+ distal pulses. Cap refill < 2 sec. No signs of venous insuffiency or stasis. No extremity ulcerations. No cyanosis.    Musculoskeletal:       Right Upper Extremity: +deformity wrist. +ecchymosis wrist and upper arm. Skin intact at wrist/forearm. +skin tear posterior upper arm. +mild swelling wrist. +TTP wrist. Elbow/shoulder NTTP. +ROM shoulder/elbow/fingers. Radial pulse 2+. Sensation intact. Brisk cap refill.    Imaging Studies:  Xray right wrist- +comminuted intra articular distal radius fx with dorsal angulation    A/P:  Pt is a  89y Female with right dorsally angulated distal radius fx    PLAN:   -consent obtained for closed reduction  -hematoma block 10cc 1% lidocaine given after sterile prep with chloroprep  -closed reduction right distal radius- see procedure note  -sugar tong splint applied   -post reduction xrays- good alignment  -splint care  -NWB RUE  -pain control  -sling for comfort  -elevate RUE  -f/u in office with Dr Sims this week. Call [place] office for appointment  D/W Dr Sims

## 2024-09-29 NOTE — ED ADULT NURSE NOTE - OBJECTIVE STATEMENT
89 YO female brought in by ambulance from [facility] after a fall at approximately 0400 this morning. Patient stated she used a walker to go to the bathroom and was coming back to her bed when she slipped and fell to land on her RUE. unknown hit to  head. No LOC on Eliquis. Patient stated she was down for 4 hours before help came. Ecchymoses noted to the Right wrist, swelling +, deformity noted to R. wrist,  bruising to right ankle.  patient denies any tingling or numbness able to move fingers. Bleeding controlled by pressure dressing on R. upper arm. abrasion noted to R. upper arm. patient denies dizziness, headache, nausea, vomiting, abdominal pain, blurry vision, chest pain or SOB. PMH Mastectomy on Right side. 1 bag of NS given to patient enroute by EMS. Priority CT called. Blood work sent, medications given as per orders, placed on cardiac monitor in Tachy sinus rhythm in 130s, EKG done.

## 2024-09-29 NOTE — PHYSICAL THERAPY INITIAL EVALUATION ADULT - PERTINENT HX OF CURRENT PROBLEM, REHAB EVAL
89 y/o F with PMH of Afib on Eliquis, HTN, HLD presented after fall admitted for possible pneumonia and rhabdomyolysis and distal right radius fracture s/p splint.

## 2024-09-29 NOTE — ED PROVIDER NOTE - CLINICAL SUMMARY MEDICAL DECISION MAKING FREE TEXT BOX
Patient made priority CAT scan given her fall and on Eliquis.  Made pan scan given diffuse body pain.  Concern for displaced right wrist fracture.  No open wounds seen over the wrist.  She has an intact radial pulse.  Concern for possible rhabdomyolysis, dehydration and acute kidney injury based on her fall.  Possible difficulty with ambulation as well.  Imaging to assess for pelvic injury as well.  Plan on labs, imaging, medications and reassessment.

## 2024-09-29 NOTE — ED PROVIDER NOTE - OBJECTIVE STATEMENT
Patient with a past medical history of atrial fibrillation on Eliquis, hypertension, hyperlipidemia who is typically alert and oriented x 3 and ambulatory at baseline is presenting after a fall.  States that she was walking to the bathroom this evening when she slipped and fell, landing on her right side.  Unclear if she hit her head.  Denies loss of consciousness.  States that she was laying on the ground until someone came to check on her this morning.  Was on the ground for approximately 4 to 6 hours.  Endorsing right wrist pain at this time as well as generalized back pain.

## 2024-09-29 NOTE — ED ADULT NURSE REASSESSMENT NOTE - NS ED NURSE REASSESS COMMENT FT1
A 2 RN skin check has been performed on admission to _U 9L___ with RN witness _Liss __.  A pressure injury __(was not)_____ identified.   ulcer noted to medial side of big toe of RLE.   skin tear noted on left shin.  R. arm in splint covered with ace bandage.

## 2024-09-29 NOTE — H&P ADULT - NSHPLABSRESULTS_GEN_ALL_CORE
12.4   10.19 )-----------( 142      ( 29 Sep 2024 12:40 )             36.1   09-29    139  |  104  |  30.9[H]  ----------------------------<  132[H]  4.2   |  20.0[L]  |  0.94    Ca    8.7      29 Sep 2024 12:40    TPro  6.2[L]  /  Alb  4.1  /  TBili  1.4  /  DBili  x   /  AST  18  /  ALT  12  /  AlkPhos  98  09-29

## 2024-09-29 NOTE — ED ADULT TRIAGE NOTE - CHIEF COMPLAINT QUOTE
Pt BIBEMS s/p unwitnessed fall. +blood thinners. Deformity noted to right wrist, bruising to right ankle. Unknown head strike. MD at bedside to evaluate pt. Priority CT called.

## 2024-09-29 NOTE — PROCEDURE NOTE - NSFINDINGS_GEN_A_CORE
SUBJECTIVE:                                                   Evelyn Miner is a 28 year old who presents to clinic today for the following health issue(s):  Patient presents with:  Abnormal Bleeding Problem: LMP: 3/22/2019, 3/8/2019    Evelyn reports she has had some irregular periods in the past month. She states her periods are normally every 26-28 days x 4 days of normal bleeding. She had light spotting on 3/8/19 for several days but no heavy bleeding, then again had light spotting for several days starting 3/22/19. She has taken 4 UPTs at home which were all negative. She was feeling bloated and tired, but now is feeling fine. She notes she did travel last month which was a change for her. She also recently changed exercise habits.     She has a history of ovarian cysts & fibroids and her mother had ovarian cancer so she gets concerned when she has any changes in menstrual history. She was told by her PCP to have an annual pelvic ultrasound for ovarian cancer screening.     Additionally, she reports an ongoing history of pelvic pain, low libido, and dyspareunia. She has never sought treatment for this.     Patient's last menstrual period was 2019 (exact date).  Patient is sexually active  .  Using natural family planning for contraception.     She would like to start on a daily pill for contraception, but was told that she could not due to history of migraine with aura. Discussed that she would be a candidate for POP but not CHC. Patient declines history of liver cancer, liver disease, breast cancer, heart disease, hypertension, blood clots.   Health maintenance updated:  yes      Problem list and histories reviewed & adjusted, as indicated.  Additional history: as documented.    Patient Active Problem List   Diagnosis     Pain in joint, lower leg     Other acne     Ureterolithiasis     CARDIOVASCULAR SCREENING; LDL GOAL LESS THAN 160     Anxiety     Contraception management     Encounter for  "triage in pregnant patient     Pregnancy     Indication for care in labor or delivery     S/P  section     Family history of malignant neoplasm of ovary     Past Surgical History:   Procedure Laterality Date     APPENDECTOMY       C NONSPECIFIC PROCEDURE  2006    Left knee exam under anesthesia and long-      SECTION N/A 3/3/2017    Procedure:  SECTION;  Surgeon: Humza Bustamante MD;  Location: [facility]     ENT SURGERY       HEAD & NECK SURGERY      [place] teeth extracted      Social History     Tobacco Use     Smoking status: Never Smoker     Smokeless tobacco: Never Used   Substance Use Topics     Alcohol use: No     Comment: rare      Problem (# of Occurrences) Relation (Name,Age of Onset)    Alcoholism (1) Father    Anxiety Disorder (1) Father    Blood Disease (1) Maternal Grandfather: leukemia     C.A.D. (1) Maternal Grandmother    Hyperlipidemia (1) Father    Hypertension (2) Maternal Grandmother, Father    Other Cancer (1) Mother: ovarian - approx age 52    Thyroid Disease (1) Mother: hyperthyroid            Current Outpatient Medications   Medication Sig     norethindrone (MICRONOR) 0.35 MG tablet Take 1 tablet (0.35 mg) by mouth daily     No current facility-administered medications for this visit.      No Known Allergies    ROS:  12 point review of systems negative other than symptoms noted below.  Genitourinary: Painful Alex and Pelvic Pain    OBJECTIVE:     /62 (BP Location: Right arm, Patient Position: Sitting, Cuff Size: Adult Regular)   Ht 1.676 m (5' 6\")   Wt 83 kg (183 lb)   LMP 2019 (Exact Date)   BMI 29.54 kg/m    Body mass index is 29.54 kg/m .    PHYSICAL EXAM:  Constitutional:  Appearance: Well nourished, well developed alert, in no acute distress  Chest:  Respiratory Effort:  Breathing unlabored  Neurologic/Psychiatric:  Mental Status:  Oriented X3      HCG negative    ASSESSMENT/PLAN:                                                  "       ICD-10-CM    1. Irregular periods N92.6 US Pelvic Complete w Transvaginal     HCG Qual, Urine ([id number])     CANCELED: Beta HCG qual IFA urine   2. Encounter for initial prescription of contraceptive pills Z30.011 norethindrone (MICRONOR) 0.35 MG tablet   3. Pelvic pain in female R10.2 [facility] REFERRAL     [facility] RE[facility]     US Pelvic Complete w Transvaginal   4. Family history of malignant neoplasm of ovary Z80.41 CANCER RISK MGMT/CANCER GENETIC COUNSELING REFERRAL       COUNSELING:  Discussed that there are no good screening tests for ovarian cancer currently. Recommended referral to genetic counseling to consider Hurst syndrome testing. Patient's mother has already tested negative for BRCA1 & 2. Discussed that ACOG and USPSTF do not recommend annual pelvic ultrasounds for screening.     Discussed that change in period could be due to stress, diet change, travelling, or change in exercise. Continue to monitor and follow up if persists. HCG negative today.     [facility]erral to [facility] due to persistent low libido and pelvic pain. Encouraged pelvic floor physical therapy additionally due to pelvic pain & dyspareunia. Discussed that these issues can be related to musculoskeletal problems, hormonal instability, and psychosocial issues.     Pelvic ultrasound ordered due to irregular periods and history of ovarian cysts and fibroids. Patient anxious to have ultrasound. Discussed if results normal, unlikely to need another ultrasound if symptoms do not change.     Discussed risks and benefits to POP. No contraindications to use. Patient would like to begin. Plan to start after pelvic ultrasound, on first day of next period. Discussed proper use, adverse effects, and warning signs.         Maya Estes, JULIETTE, WHULYSSES-BC       reduction completed

## 2024-09-29 NOTE — PHYSICAL THERAPY INITIAL EVALUATION ADULT - ADDITIONAL COMMENTS
Pt reports living at the [facility] and independent amb with RW, with functional mobility and with ADLs.

## 2024-09-29 NOTE — PHYSICAL THERAPY INITIAL EVALUATION ADULT - RANGE OF MOTION EXAMINATION, REHAB EVAL
right UE NT/Left LE ROM was WFL (within functional limits)/bilateral lower extremity ROM was WFL (within functional limits)

## 2024-09-29 NOTE — ED PROVIDER NOTE - PHYSICAL EXAMINATION
Constitutional: Awake, Alert, appears uncomfortable  HEAD: Normocephalic, no open wounds seen  EYES: PERRL, EOM intact, conjunctiva and sclera are clear bilaterally.  ENT: External ears normal. No rhinorrhea, no tracheal deviation   NECK: Supple, non-tender  CARDIOVASCULAR: regular rate  RESPIRATORY: Normal respiratory effort; breath sounds are clear b/l. Speaking in full sentences.  ABDOMEN: Soft; non-tender, non-distended. No rebound or guarding.   MSK:  right wrist with deformity in ems splint, intact radial pulse with intact cap refill, wound to proximal shoulder without pain, no TTP over b/l lower extremities  SKIN: Warm, dry, wounds as above  NEURO: A&O x3. limitation in strength secondary to pain

## 2024-09-29 NOTE — ED ADULT NURSE REASSESSMENT NOTE - NS ED NURSE REASSESS COMMENT FT1
Patient A&Ox3 resting in bed, continues to be on cardiac monitor in Tachy sinus rhythm. Patient's Right arm placed in a splint by orthopedic team.  for Fracture of  R. radius. 18 G iv inserted in LAC as directed by MD Gr. Iv fluids and medications given as per orders. Awaiting PT evaluation, safety measures in place.

## 2024-09-29 NOTE — ED PROVIDER NOTE - PROGRESS NOTE DETAILS
Patient with no known history of chronic kidney disease.  Due to patient trauma and being on blood thinners, the benefits of a CAT scan with contrast outweigh the risk of suspected renal injury at this time.  Jean Raphael MD. I called and made daughter aware of the findings of the breast nodule and adrenal nodule.  Jean Raphael MD. Patient reevaluated, improved after getting her home medications.  Seen by orthopedics, splinted.  Seen by physical therapy, recommending [facility].  Patient with mildly uptrending CK.  Imaging otherwise negative for acute trauma.  I discussed with radiologist and he states the findings on the CT cervical spine are likely dependent edema from her being on the ground for extended period of time given no other concern for neck injury.  Patient also with potential early developing pneumonia.  With reported cough, will treat.  Family updated on plan of care.  Plan on admission at this time.  Spoke with Dr. Mullen.  Jean Raphael MD.

## 2024-09-29 NOTE — PATIENT PROFILE ADULT - FALL HARM RISK - HARM RISK INTERVENTIONS

## 2024-09-30 LAB
ANION GAP SERPL CALC-SCNC: 13 MMOL/L — SIGNIFICANT CHANGE UP (ref 5–17)
BASOPHILS # BLD AUTO: 0.03 K/UL — SIGNIFICANT CHANGE UP (ref 0–0.2)
BASOPHILS NFR BLD AUTO: 0.4 % — SIGNIFICANT CHANGE UP (ref 0–2)
BUN SERPL-MCNC: 24.3 MG/DL — HIGH (ref 8–20)
CALCIUM SERPL-MCNC: 8.7 MG/DL — SIGNIFICANT CHANGE UP (ref 8.4–10.5)
CHLORIDE SERPL-SCNC: 102 MMOL/L — SIGNIFICANT CHANGE UP (ref 96–108)
CK MB CFR SERPL CALC: 11.9 NG/ML — HIGH (ref 0–6.7)
CK SERPL-CCNC: 704 U/L — HIGH (ref 25–170)
CO2 SERPL-SCNC: 22 MMOL/L — SIGNIFICANT CHANGE UP (ref 22–29)
CREAT SERPL-MCNC: 0.75 MG/DL — SIGNIFICANT CHANGE UP (ref 0.5–1.3)
EGFR: 74 ML/MIN/1.73M2 — SIGNIFICANT CHANGE UP
EOSINOPHIL # BLD AUTO: 0.02 K/UL — SIGNIFICANT CHANGE UP (ref 0–0.5)
EOSINOPHIL NFR BLD AUTO: 0.3 % — SIGNIFICANT CHANGE UP (ref 0–6)
GLUCOSE SERPL-MCNC: 99 MG/DL — SIGNIFICANT CHANGE UP (ref 70–99)
HCT VFR BLD CALC: 33.9 % — LOW (ref 34.5–45)
HGB BLD-MCNC: 11.4 G/DL — LOW (ref 11.5–15.5)
IMM GRANULOCYTES NFR BLD AUTO: 0.6 % — SIGNIFICANT CHANGE UP (ref 0–0.9)
LYMPHOCYTES # BLD AUTO: 1.23 K/UL — SIGNIFICANT CHANGE UP (ref 1–3.3)
LYMPHOCYTES # BLD AUTO: 18.2 % — SIGNIFICANT CHANGE UP (ref 13–44)
MCHC RBC-ENTMCNC: 28.6 PG — SIGNIFICANT CHANGE UP (ref 27–34)
MCHC RBC-ENTMCNC: 33.6 GM/DL — SIGNIFICANT CHANGE UP (ref 32–36)
MCV RBC AUTO: 85 FL — SIGNIFICANT CHANGE UP (ref 80–100)
MONOCYTES # BLD AUTO: 1.33 K/UL — HIGH (ref 0–0.9)
MONOCYTES NFR BLD AUTO: 19.7 % — HIGH (ref 2–14)
NEUTROPHILS # BLD AUTO: 4.11 K/UL — SIGNIFICANT CHANGE UP (ref 1.8–7.4)
NEUTROPHILS NFR BLD AUTO: 60.8 % — SIGNIFICANT CHANGE UP (ref 43–77)
PLATELET # BLD AUTO: 125 K/UL — LOW (ref 150–400)
POTASSIUM SERPL-MCNC: 4.3 MMOL/L — SIGNIFICANT CHANGE UP (ref 3.5–5.3)
POTASSIUM SERPL-SCNC: 4.3 MMOL/L — SIGNIFICANT CHANGE UP (ref 3.5–5.3)
RBC # BLD: 3.99 M/UL — SIGNIFICANT CHANGE UP (ref 3.8–5.2)
RBC # FLD: 14.7 % — HIGH (ref 10.3–14.5)
SODIUM SERPL-SCNC: 137 MMOL/L — SIGNIFICANT CHANGE UP (ref 135–145)
WBC # BLD: 6.76 K/UL — SIGNIFICANT CHANGE UP (ref 3.8–10.5)
WBC # FLD AUTO: 6.76 K/UL — SIGNIFICANT CHANGE UP (ref 3.8–10.5)

## 2024-09-30 PROCEDURE — 99233 SBSQ HOSP IP/OBS HIGH 50: CPT

## 2024-09-30 RX ORDER — SODIUM CHLORIDE IRRIG SOLUTION 0.9 %
1000 SOLUTION, IRRIGATION IRRIGATION
Refills: 0 | Status: DISCONTINUED | OUTPATIENT
Start: 2024-09-30 | End: 2024-10-08

## 2024-09-30 RX ORDER — DILTIAZEM HCL 300 MG
120 CAPSULE, EXTENDED RELEASE 24HR ORAL DAILY
Refills: 0 | Status: DISCONTINUED | OUTPATIENT
Start: 2024-09-30 | End: 2024-10-01

## 2024-09-30 RX ORDER — CEFTRIAXONE SODIUM 1 G
1000 VIAL (EA) INJECTION EVERY 24 HOURS
Refills: 0 | Status: DISCONTINUED | OUTPATIENT
Start: 2024-09-30 | End: 2024-10-03

## 2024-09-30 RX ORDER — TRAMADOL HYDROCHLORIDE 50 MG/1
25 TABLET, COATED ORAL ONCE
Refills: 0 | Status: DISCONTINUED | OUTPATIENT
Start: 2024-09-30 | End: 2024-09-30

## 2024-09-30 RX ORDER — METOPROLOL TARTRATE 50 MG
25 TABLET ORAL
Refills: 0 | Status: DISCONTINUED | OUTPATIENT
Start: 2024-09-30 | End: 2024-10-02

## 2024-09-30 RX ADMIN — Medication 25 MILLIGRAM(S): at 17:22

## 2024-09-30 RX ADMIN — TRAMADOL HYDROCHLORIDE 25 MILLIGRAM(S): 50 TABLET, COATED ORAL at 06:18

## 2024-09-30 RX ADMIN — ATORVASTATIN CALCIUM 20 MILLIGRAM(S): 10 TABLET, FILM COATED ORAL at 21:27

## 2024-09-30 RX ADMIN — Medication 1000 MICROGRAM(S): at 10:51

## 2024-09-30 RX ADMIN — AZITHROMYCIN 255 MILLIGRAM(S): 250 TABLET, FILM COATED ORAL at 17:22

## 2024-09-30 RX ADMIN — Medication 240 MILLIGRAM(S): at 05:54

## 2024-09-30 RX ADMIN — Medication 1000 MILLIGRAM(S): at 11:22

## 2024-09-30 RX ADMIN — Medication 50 MILLIGRAM(S): at 05:54

## 2024-09-30 RX ADMIN — Medication 100 MILLILITER(S): at 11:30

## 2024-09-30 NOTE — PROVIDER CONTACT NOTE (OTHER) - SITUATION
pt vitals manual BP 95/50 HR 42 on cardiac monitor  Monitor tech reported conversion of afib to aflutter

## 2024-09-30 NOTE — PROGRESS NOTE ADULT - ASSESSMENT
89 y/o F with PMH of Afib on Eliquis, HTN, HLD presented after fall admitted for possible pneumonia and rhabdomyolysis.     Mechanical Fall  - CT head: No acute intracranial hemorrhage or calvarial fracture.  - PT consulted in ED, rec JOSE    Rhabdomyolysis  -  -> 865 -> 700  - Likely due to fall, improving  - IVF   - Monitor    Wrist Fracture  - Xray: Acute, comminuted, severely displaced, an intra-articular fracture of the distal right radius. No dislocation. Acute obliquely oriented moderately displaced fracture of the distal right ulna metaphysis. Improved alignment status post reduction.  - Ortho recs appreciated  - s/p reduction    Community Acquired Pneumonia  - CT chest: Right middle lobe focal consolidative opacity.  - Azithromycin 500mg daily x 3 days  - Ceftriaxone 1g daily x 5 days    Chronic Atrial Fibrillation  - Telemetry monitoring  - Toprol XL 50mg daily    HTN  - Cardizem 240mg daily    HLD  - Lipitor 20mg nightly    DVT ppx  - Eliquis 2.5mg BID    Dispo: Medically Stable for JOSE

## 2024-09-30 NOTE — PROGRESS NOTE ADULT - SUBJECTIVE AND OBJECTIVE BOX
Hospitalist Progress Note    Chief Complaint:  Fall    SUBJECTIVE / OVERNIGHT EVENTS:  No events overnight, patient seen at bedside, in NAD, no new complaints today. Patient denies chest pain, SOB, abd pain, N/V, fever, chills, dysuria or any other complaints. All remainder ROS negative.     MEDICATIONS  (STANDING):  atorvastatin 20 milliGRAM(s) Oral at bedtime  azithromycin  IVPB      azithromycin  IVPB 500 milliGRAM(s) IV Intermittent every 24 hours  cefTRIAXone Injectable. 1000 milliGRAM(s) IV Push every 24 hours  cyanocobalamin 1000 MICROGram(s) Oral daily  diltiazem    milliGRAM(s) Oral daily  influenza  Vaccine (HIGH DOSE) 0.5 milliLiter(s) IntraMuscular once  metoprolol succinate ER 50 milliGRAM(s) Oral two times a day  sodium chloride 0.9%. 1000 milliLiter(s) (60 mL/Hr) IV Continuous <Continuous>    MEDICATIONS  (PRN):        I&O's Summary      PHYSICAL EXAM:  Vital Signs Last 24 Hrs  T(C): 36.7 (30 Sep 2024 07:27), Max: 36.8 (29 Sep 2024 10:49)  T(F): 98.1 (30 Sep 2024 07:27), Max: 98.3 (29 Sep 2024 10:49)  HR: 105 (30 Sep 2024 07:27) (80 - 128)  BP: 112/77 (30 Sep 2024 07:27) (112/77 - 144/94)  BP(mean): --  RR: 18 (30 Sep 2024 07:27) (18 - 20)  SpO2: 98% (30 Sep 2024 07:27) (95% - 99%)    Parameters below as of 30 Sep 2024 07:27  Patient On (Oxygen Delivery Method): room air          Constitutional: alert and oriented, in no acute distress   Neck: Soft and supple  Respiratory: Clear to auscultation bilaterally, no wheezes or crackles  Cardiovascular: Irregular rhythm  Gastrointestinal: Soft, non-tender to palpation, +bs  Vascular: 2+ peripheral pulses  Neurological: A/O x 3, no focal neurological deficits  Musculoskeletal: Right wrist in splint    LABS:                        11.4   6.76  )-----------( 125      ( 30 Sep 2024 05:00 )             33.9     09-30    137  |  102  |  24.3[H]  ----------------------------<  99  4.3   |  22.0  |  0.75    Ca    8.7      30 Sep 2024 05:00    TPro  6.2[L]  /  Alb  4.1  /  TBili  1.4  /  DBili  x   /  AST  18  /  ALT  12  /  AlkPhos  98  09-29    PT/INR - ( 29 Sep 2024 12:40 )   PT: 18.6 sec;   INR: 1.61 ratio         PTT - ( 29 Sep 2024 12:40 )  PTT:30.7 sec  CARDIAC MARKERS ( 30 Sep 2024 05:00 )  x     / x     / x     / x     / 11.9 ng/mL  CARDIAC MARKERS ( 29 Sep 2024 15:10 )  x     / x     / x     / x     / 15.7 ng/mL  CARDIAC MARKERS ( 29 Sep 2024 12:40 )  x     / x     / x     / x     / 13.0 ng/mL      Urinalysis Basic - ( 30 Sep 2024 05:00 )    Color: x / Appearance: x / SG: x / pH: x  Gluc: 99 mg/dL / Ketone: x  / Bili: x / Urobili: x   Blood: x / Protein: x / Nitrite: x   Leuk Esterase: x / RBC: x / WBC x   Sq Epi: x / Non Sq Epi: x / Bacteria: x        CAPILLARY BLOOD GLUCOSE      POCT Blood Glucose.: 131 mg/dL (29 Sep 2024 14:42)        RADIOLOGY & ADDITIONAL TESTS:  Results Reviewed: Y  Imaging Personally Reviewed: N  Electrocardiogram Personally Reviewed: AMEENA

## 2024-09-30 NOTE — GOALS OF CARE CONVERSATION - ADVANCED CARE PLANNING - CONVERSATION DETAILS
Pt requesting to complete MOLST form. Pt A&O x 4 with preexisting wishes to be DNR/ DNI. All questions and concerns addressed. MOLST placed in chart.

## 2024-10-01 ENCOUNTER — TRANSCRIPTION ENCOUNTER (OUTPATIENT)
Age: 89
End: 2024-10-01

## 2024-10-01 LAB
ANION GAP SERPL CALC-SCNC: 14 MMOL/L — SIGNIFICANT CHANGE UP (ref 5–17)
BASOPHILS # BLD AUTO: 0.03 K/UL — SIGNIFICANT CHANGE UP (ref 0–0.2)
BASOPHILS NFR BLD AUTO: 0.5 % — SIGNIFICANT CHANGE UP (ref 0–2)
BUN SERPL-MCNC: 23.1 MG/DL — HIGH (ref 8–20)
CALCIUM SERPL-MCNC: 8.6 MG/DL — SIGNIFICANT CHANGE UP (ref 8.4–10.5)
CHLORIDE SERPL-SCNC: 104 MMOL/L — SIGNIFICANT CHANGE UP (ref 96–108)
CK MB CFR SERPL CALC: 9.6 NG/ML — HIGH (ref 0–6.7)
CK SERPL-CCNC: 476 U/L — HIGH (ref 25–170)
CO2 SERPL-SCNC: 21 MMOL/L — LOW (ref 22–29)
CREAT SERPL-MCNC: 0.95 MG/DL — SIGNIFICANT CHANGE UP (ref 0.5–1.3)
EGFR: 56 ML/MIN/1.73M2 — LOW
EOSINOPHIL # BLD AUTO: 0.02 K/UL — SIGNIFICANT CHANGE UP (ref 0–0.5)
EOSINOPHIL NFR BLD AUTO: 0.3 % — SIGNIFICANT CHANGE UP (ref 0–6)
GLUCOSE SERPL-MCNC: 114 MG/DL — HIGH (ref 70–99)
HCT VFR BLD CALC: 32.3 % — LOW (ref 34.5–45)
HGB BLD-MCNC: 11 G/DL — LOW (ref 11.5–15.5)
IMM GRANULOCYTES NFR BLD AUTO: 0.8 % — SIGNIFICANT CHANGE UP (ref 0–0.9)
LYMPHOCYTES # BLD AUTO: 1.1 K/UL — SIGNIFICANT CHANGE UP (ref 1–3.3)
LYMPHOCYTES # BLD AUTO: 18.6 % — SIGNIFICANT CHANGE UP (ref 13–44)
MAGNESIUM SERPL-MCNC: 1.9 MG/DL — SIGNIFICANT CHANGE UP (ref 1.6–2.6)
MCHC RBC-ENTMCNC: 28.8 PG — SIGNIFICANT CHANGE UP (ref 27–34)
MCHC RBC-ENTMCNC: 34.1 GM/DL — SIGNIFICANT CHANGE UP (ref 32–36)
MCV RBC AUTO: 84.6 FL — SIGNIFICANT CHANGE UP (ref 80–100)
MONOCYTES # BLD AUTO: 1.09 K/UL — HIGH (ref 0–0.9)
MONOCYTES NFR BLD AUTO: 18.4 % — HIGH (ref 2–14)
NEUTROPHILS # BLD AUTO: 3.63 K/UL — SIGNIFICANT CHANGE UP (ref 1.8–7.4)
NEUTROPHILS NFR BLD AUTO: 61.4 % — SIGNIFICANT CHANGE UP (ref 43–77)
PLATELET # BLD AUTO: 123 K/UL — LOW (ref 150–400)
POTASSIUM SERPL-MCNC: 3.9 MMOL/L — SIGNIFICANT CHANGE UP (ref 3.5–5.3)
POTASSIUM SERPL-SCNC: 3.9 MMOL/L — SIGNIFICANT CHANGE UP (ref 3.5–5.3)
RBC # BLD: 3.82 M/UL — SIGNIFICANT CHANGE UP (ref 3.8–5.2)
RBC # FLD: 14.6 % — HIGH (ref 10.3–14.5)
SODIUM SERPL-SCNC: 139 MMOL/L — SIGNIFICANT CHANGE UP (ref 135–145)
WBC # BLD: 5.92 K/UL — SIGNIFICANT CHANGE UP (ref 3.8–10.5)
WBC # FLD AUTO: 5.92 K/UL — SIGNIFICANT CHANGE UP (ref 3.8–10.5)

## 2024-10-01 PROCEDURE — 99233 SBSQ HOSP IP/OBS HIGH 50: CPT

## 2024-10-01 RX ORDER — APIXABAN 5 MG/1
2.5 TABLET, FILM COATED ORAL EVERY 12 HOURS
Refills: 0 | Status: DISCONTINUED | OUTPATIENT
Start: 2024-10-01 | End: 2024-10-01

## 2024-10-01 RX ORDER — CEFPODOXIME PROXETIL 50 MG/5 ML
1 SUSPENSION, RECONSTITUTED, ORAL (ML) ORAL
Qty: 4 | Refills: 0
Start: 2024-10-01 | End: 2024-10-02

## 2024-10-01 RX ORDER — FUROSEMIDE 10 MG/ML
20 INJECTION INTRAVENOUS
Refills: 0 | Status: DISCONTINUED | OUTPATIENT
Start: 2024-10-02 | End: 2024-10-02

## 2024-10-01 RX ORDER — DILTIAZEM HCL 300 MG
240 CAPSULE, EXTENDED RELEASE 24HR ORAL DAILY
Refills: 0 | Status: DISCONTINUED | OUTPATIENT
Start: 2024-10-02 | End: 2024-10-02

## 2024-10-01 RX ORDER — TRAMADOL HYDROCHLORIDE 50 MG/1
25 TABLET, COATED ORAL ONCE
Refills: 0 | Status: DISCONTINUED | OUTPATIENT
Start: 2024-10-01 | End: 2024-10-01

## 2024-10-01 RX ADMIN — Medication 25 MILLIGRAM(S): at 17:19

## 2024-10-01 RX ADMIN — Medication 1000 MILLIGRAM(S): at 11:20

## 2024-10-01 RX ADMIN — TRAMADOL HYDROCHLORIDE 25 MILLIGRAM(S): 50 TABLET, COATED ORAL at 17:17

## 2024-10-01 RX ADMIN — APIXABAN 2.5 MILLIGRAM(S): 5 TABLET, FILM COATED ORAL at 17:17

## 2024-10-01 RX ADMIN — AZITHROMYCIN 255 MILLIGRAM(S): 250 TABLET, FILM COATED ORAL at 17:17

## 2024-10-01 RX ADMIN — TRAMADOL HYDROCHLORIDE 25 MILLIGRAM(S): 50 TABLET, COATED ORAL at 18:12

## 2024-10-01 RX ADMIN — Medication 1000 MICROGRAM(S): at 11:20

## 2024-10-01 RX ADMIN — Medication 25 MILLIGRAM(S): at 05:00

## 2024-10-01 RX ADMIN — ATORVASTATIN CALCIUM 20 MILLIGRAM(S): 10 TABLET, FILM COATED ORAL at 21:35

## 2024-10-01 RX ADMIN — Medication 120 MILLIGRAM(S): at 05:00

## 2024-10-01 NOTE — DISCHARGE NOTE PROVIDER - NSDCMRMEDTOKEN_GEN_ALL_CORE_FT
atorvastatin 20 mg oral tablet: 1 tab(s) orally once a day  cefpodoxime 200 mg oral tablet: 1 tab(s) orally 2 times a day  cyanocobalamin 1000 mcg oral tablet: 1 tab(s) orally once a day  DilTIAZem (Eqv-Cardizem CD) 240 mg/24 hours oral capsule, extended release: 1 cap(s) orally once a day  Eliquis 2.5 mg oral tablet: 1 tab(s) orally 2 times a day  furosemide 40 mg oral tablet: 0.5 tab(s) orally every other day  metoprolol succinate 50 mg oral tablet, extended release: 1 tab(s) orally 2 times a day  Potassium Chloride (Eqv-Klor-Con 10) 10 mEq oral tablet, extended release: 1 tab(s) orally once a day   atorvastatin 20 mg oral tablet: 1 tab(s) orally once a day  cefpodoxime 200 mg oral tablet: 1 tab(s) orally 2 times a day  cyanocobalamin 1000 mcg oral tablet: 1 tab(s) orally once a day  DilTIAZem (Eqv-Cardizem CD) 240 mg/24 hours oral capsule, extended release: 1 cap(s) orally once a day  furosemide 40 mg oral tablet: 0.5 tab(s) orally every other day  metoprolol succinate 50 mg oral tablet, extended release: 1 tab(s) orally 2 times a day  Potassium Chloride (Eqv-Klor-Con 10) 10 mEq oral tablet, extended release: 1 tab(s) orally once a day   acetaminophen 325 mg oral tablet: 2 tab(s) orally every 6 hours  atorvastatin 20 mg oral tablet: 1 tab(s) orally once a day  Cardizem 60 mg oral tablet: 1 tab(s) orally every 8 hours  cyanocobalamin 1000 mcg oral tablet: 1 tab(s) orally once a day  Lasix 20 mg oral tablet: 1 tab(s) orally every other day  metoprolol succinate 50 mg oral tablet, extended release: 1 tab(s) orally 2 times a day  Potassium Chloride (Eqv-Klor-Con 10) 10 mEq oral tablet, extended release: 1 tab(s) orally once a day  senna leaf extract oral tablet: 2 tab(s) orally once a day (at bedtime)  traMADol 50 mg oral tablet: 0.5 tab(s) orally every 8 hours As needed Severe Pain (7 - 10)

## 2024-10-01 NOTE — DISCHARGE NOTE PROVIDER - HOSPITAL COURSE
89 y.o female with PMHx left breast carcinoma s/p left mastectomy about 20 years ago, Afib on Eliquis as of 6 months ago, HTN, HLD presented to the ED following a fall at her [facility] home, where she fell onto her right side and had been laying on the ground for 4 hours before being found. Patient presented to the ED on 9/29 following a fall with labs concerning for rhabdomyolysis. Trauma scan was positive for fractured distal radius that was reduced by Ortho on 9/29. CT Chest demonstrated right middle lobe consolidation for which patient was placed on 3 day course of azithromycin and 5 day course of ceftriaxone. Her telemetry monitoring alarmed on 9/29 following an episode of hypotension and bradycardia with Afib converted to Aflutter responded to with IVF.      89 y.o female with PMHx left breast carcinoma s/p left mastectomy about 20 years ago, Afib on Eliquis as of 6 months ago, HTN, HLD presented to the ED following a fall at her [facility] home, where she fell onto her right side and had been laying on the ground for 4 hours before being found. Patient presented to the ED on 9/29 following a fall with labs concerning for rhabdomyolysis. Trauma scan was positive for fractured distal radius that was reduced by Ortho on 9/29. CT Chest demonstrated right middle lobe consolidation for which patient was placed on 3 day course of azithromycin and 5 day course of ceftriaxone. Her telemetry monitoring alarmed on 9/29 following an episode of hypotension and bradycardia with Afib converted to Aflutter responded to with IVF. Pt was seen by cardiology following her telemetry alarm on 9/29, recommending discontinue of Eliquis following her fall in addition to furosemide 20mg PO every other day. In the morning of 10/2, pt was tachycardic with rates in the 130s. Patient received her scheduled Cardizem and metoprolol.     89 y.o female with PMHx left breast carcinoma s/p left mastectomy about 20 years ago, Afib on Eliquis as of 6 months ago, HTN, HLD presented to the ED following a fall at her [facility] home, where she fell onto her right side and had been laying on the ground for 4 hours before being found. Patient presented to the ED on 9/29 following a fall with labs concerning for rhabdomyolysis. Trauma scan was positive for fractured distal radius that was reduced by Ortho on 9/29. CT Chest demonstrated right middle lobe consolidation for which patient was placed on 3 day course of azithromycin and 5 day course of ceftriaxone. Her telemetry monitoring alarmed on 9/29 following an episode of hypotension and bradycardia with Afib converted to Aflutter responded to with IVF. Pt was seen by cardiology following her telemetry alarm on 9/29, recommending discontinue of Eliquis following her fall in addition to furosemide 20mg PO every other day. In the morning of 10/2, pt was tachycardic with rates in the 130s. Patient received her scheduled Cardizem and metoprolol. Patient was in sustained Afib with RVR to 130s on 10/4. Primary team pursued PE workup on 10/4 with CTA showing negative PE but bilateral pleural effusions. LE dopplers negative for DVT. Patient weaned to room air on 10/5. Patient reports difficulty breathing on 10/6 and was placed on 2LNC.  Patient scheduled for closed reduction of right wrist malalignment 10/7 with local anesthesia. 89 y.o female with PMHx left breast carcinoma s/p left mastectomy about 20 years ago, Afib on Eliquis as of 6 months ago, HTN, HLD presented to the ED following a fall at her [facility] home, where she fell onto her right side and had been laying on the ground for 4 hours before being found. Patient presented to the ED on 9/29 following a fall with labs concerning for rhabdomyolysis. Trauma scan was positive for fractured distal radius that was reduced by Ortho on 9/29. CT Chest demonstrated right middle lobe consolidation for which patient was placed on 3 day course of azithromycin and 5 day course of ceftriaxone. Her telemetry monitoring alarmed on 9/29 following an episode of hypotension and bradycardia with Afib converted to Aflutter responded to with IVF. Pt was seen by cardiology following her telemetry alarm on 9/29, recommending discontinue of Eliquis following her fall in addition to furosemide 20mg PO every other day. In the morning of 10/2, pt was tachycardic with rates in the 130s. Patient received her scheduled Cardizem and metoprolol. Patient was in sustained Afib with RVR to 130s on 10/4. Primary team pursued PE workup on 10/4 with CTA showing negative PE but bilateral pleural effusions. LE dopplers negative for DVT. Patient weaned to room air on 10/5. Patient reports difficulty breathing on 10/6 and was placed on 2LNC.  s/p  closed reduction of right wrist malalignment 10/7 with local anesthesia.

## 2024-10-01 NOTE — DISCHARGE NOTE PROVIDER - NSDCFUADDINST_GEN_ALL_CORE_FT
For your right arm, keep your arm cast and splint dry. Keep your casted right arm elevated with pillows to prevent swelling. Use Tylenol for pain as needed. You can attach plastic bags around your cast when you bathe to prevent it from getting wet. Do not use powder or lotion under your cast. Expect healing time to be around 12 weeks. For your right arm, keep your arm cast and splint dry. Keep your casted right arm elevated with pillows to prevent swelling. Use Tylenol for pain as needed. You can attach plastic bags around your cast when you bathe to prevent it from getting wet. Do not use powder or lotion under your cast. Expect healing time to be around 12 weeks. Splint is in place to keep your arm from moving. Limit weight bearing of the right arm.

## 2024-10-01 NOTE — DISCHARGE NOTE PROVIDER - CARE PROVIDER_API CALL
Bernadette Sims  Orthopaedic Surgery  [address] [place] [address], [address]  [place] [address]  Phone: [phone]  Fax: [phone]  Follow Up Time:     Ria Lara NP in Adult Health  Follow Up Time:

## 2024-10-01 NOTE — PROGRESS NOTE ADULT - ASSESSMENT
89 y.o female with PMHx left breast carcinoma s/p left mastectomy about 20 years ago, Afib on Eliquis as of 6 months ago, HTN, HLD presented to the ED following a fall at her [facility] home. CT Chest notable for right middle lobe consolidation.       #Mechanical Fall   -CT head negative for fracture and hemorrhage   -PT consult recommending Campos    #Rhabdomyolysis   -Initial -704, currently downtrending   -IVF   -serial monitoring     #Right distal radius fracture   -reduced by ortho 9/29    #Community Acquired PNA  -CT Chest showing right middle lobe focal consolidation  -on 2L NC  -Azithromycin 500mg 3 days (End date: 10/1/24)  -Ceftriaxone 1g daily 5 days (End date: 10/3/24)      #Afib  -Telemetry monitoring   -Metoprolol succinate 25mg BID     #HTN  -diltiazem 120 mg daily     #HLD   -atorvastatin 20mg bedtime     DVT ppx: Eliquis 2.5mg BID   Dispo: D/c to [facility] once medically stable

## 2024-10-01 NOTE — CONSULT NOTE ADULT - SUBJECTIVE AND OBJECTIVE BOX
Patient is a 89y old  Female who presents with a chief complaint of mechanical Fall (01 Oct 2024 13:49)      HPI:  89 y.o female with PMHx left breast carcinoma s/p left mastectomy about 20 years ago, Afib on Eliquis as of 6 months ago, HTN, HLD presented to the ED following a fall at her [facility], where she fell onto her right side and had been laying on the ground for 4 hours before being found. The patient has been living at the nursing home for the past 2 years. She denies loss of consciousness and ED trauma scan was notable for a right distal radius fracture s/p reduction done by ortho service. CT chest in ED demonstrated a right middle lobe consolidation for which patient was placed on 3 day course of azithromycin and 5 day course of ceftriaxone.    PAST MEDICAL & SURGICAL HISTORY:  Mild HTN  Chronic atrial fibrillation  HLD (hyperlipidemia)  Chronic CHF  History of left mastectomy          PREVIOUS DIAGNOSTIC TESTING:      ECHO  FINDINGS: 2024   Normal LVEF 55-60%, Mild AI, Mild-moderate MR, Moderate TR, Mild-moderate Pulmonary hypertension            Allergies  No Known Allergies    MEDICATIONS  (HOME):    · 	Eliquis 2.5 mg oral tablet: 1 tab(s) orally 2 times a day  · 	atorvastatin 20 mg oral tablet: 1 tab(s) orally once a day  · 	DilTIAZem (Eqv-Cardizem CD) 240 mg/24 hours oral capsule, extended release: 1 cap(s) orally once a day  · 	furosemide 40 mg oral tablet: 0.5 tab(s) orally every other day  · 	Potassium Chloride (Eqv-Klor-Con 10) 10 mEq oral tablet, extended release: 1 tab(s) orally once a day  · 	cyanocobalamin 1000 mcg oral tablet: 1 tab(s) orally once a day  · 	metoprolol succinate 50 mg oral tablet, extended release: 1 tab(s) orally 2 times a day  · 	ipratropium 42 mcg/inh (0.06%) nasal spray: 1 spray(s) intranasally 2 times a day    .    FAMILY HISTORY:  Family history of heart disease (Father)            CIGARETTES: NEVER    ALCOHOL: DENIES      REVIEW OF SYSTEMS:  CONSTITUTIONAL: No fever, weight loss, or fatigue  EYES: No eye pain, visual disturbances, or discharge  ENMT:  No difficulty hearing, tinnitus, vertigo; No sinus or throat pain  NECK: No pain or stiffness  RESPIRATORY: No cough, wheezing, chills or hemoptysis; No Shortness of Breath  CARDIOVASCULAR: No chest pain, palpitations, passing out, dizziness, or leg swelling  GASTROINTESTINAL: No abdominal or epigastric pain. No nausea, vomiting, or hematemesis; No diarrhea or constipation. No melena or hematochezia.  GENITOURINARY: No dysuria, frequency, hematuria, or incontinence  NEUROLOGICAL: No headaches, memory loss, loss of strength, numbness, or tremors  SKIN: No itching, burning, rashes, or lesions   ENDOCRINE: No heat or cold intolerance; No hair loss  MUSCULOSKELETAL: No joint pain or swelling; No muscle, back, or extremity pain, R wrist in splint  PSYCHIATRIC: No depression, anxiety, mood swings, or difficulty sleeping  HEME/LYMPH: No easy bruising, or bleeding gums  ALLERY AND IMMUNOLOGIC: No hives or eczema	    Vital Signs Last 24 Hrs  T(C): 36.6 (01 Oct 2024 16:31), Max: 36.6 (01 Oct 2024 04:22)  T(F): 97.8 (01 Oct 2024 16:31), Max: 97.8 (01 Oct 2024 04:22)  HR: 99 (01 Oct 2024 16:31) (66 - 105)  BP: 120/74 (01 Oct 2024 16:31) (120/74 - 146/78)  BP(mean): --  RR: 17 (01 Oct 2024 16:31) (17 - 19)  SpO2: 95% (01 Oct 2024 16:31) (95% - 96%)    Parameters below as of 01 Oct 2024 16:31  Patient On (Oxygen Delivery Method): nasal cannula        Daily     Daily Weight in k.7 (01 Oct 2024 04:22)    I&O's Detail      PHYSICAL EXAM:  Appearance: Normal appearance	  HEENT:   Normal oral mucosa, PERRL, EOMI, sclera non-icteric	  Cardiovascular: Normal S1 S2, No JVD, 1/6 systolic murmur, No carotid bruits, No peripheral edema  Respiratory: Lungs clear to auscultation	  Psychiatry: A & O x 3, Mood & affect appropriate  Gastrointestinal:  Soft, Non-tender, + BS, no bruits	  Skin: No rashes, No ecchymoses, No cyanosis  Neurologic: Grossly non-focal with full strength in all four extremities  Extremities: Normal range of motion, Right distal radius reduced and in ACE wraps + splint  Vascular: Peripheral pulses bilaterally      INTERPRETATION OF TELEMETRY: A-Fib HR 90's        LABS:                        11.0   5.92  )-----------( 123      ( 01 Oct 2024 04:45 )             32.3     10    139  |  104  |  23.1[H]  ----------------------------<  114[H]  3.9   |  21.0[L]  |  0.95    Ca    8.6      01 Oct 2024 04:45  Mg     1.9     10      CARDIAC MARKERS ( 01 Oct 2024 04:45 )  x     / x     / x     / x     / 9.6 ng/mL  CARDIAC MARKERS ( 30 Sep 2024 05:00 )  x     / x     / x     / x     / 11.9 ng/mL        Urinalysis Basic - ( 01 Oct 2024 04:45 )    Color: x / Appearance: x / SG: x / pH: x  Gluc: 114 mg/dL / Ketone: x  / Bili: x / Urobili: x   Blood: x / Protein: x / Nitrite: x   Leuk Esterase: x / RBC: x / WBC x   Sq Epi: x / Non Sq Epi: x / Bacteria: x      Assessment:  89 y.o female with PMHx left breast carcinoma s/p left mastectomy about 20 years ago, Afib on Eliquis as of 6 months ago, HTN, HLD presented to the ED following a fall at her [facility], where she fell onto her right side and had been laying on the ground for 4 hours before being found. The patient has been living at the nursing home for the past 2 years. She denies loss of consciousness and ED trauma scan was notable for a right distal radius fracture s/p reduction done by ortho service. CT chest in ED demonstrated a right middle lobe consolidation for which patient was placed on 3 day course of azithromycin and 5 day course of ceftriaxone.      Pt's telemetry monitoring alarmed on  following an episode of hypotension and bradycardia, Afib converted to Aflutter, Pt responded to IVF. Cardiology called to evaluate.    Pt seen today sitting up in bed awake and alert, denies chest pain and SOB, BP stable, remains A-Fib on CM HR 80-90's. No further episodes of bradycardia.  Office Echo 2024 shows Normal LVEF 55-60%, Mild AI, Mild-moderate MR, Moderate TR, Mild-moderate Pulmonary hypertension. Right distal radius reduced by Ortho and remains in ACE wraps + splint      Plan:  S/p Mechanical fall/ A-Fib  A-Fib anticoagulation is now contraindicated as Pt is S/p multiple recent falls  Discontinue Eliquis   Increase Diltiazem CD to 240 mg PO daily  Continue Metoprolol  Start Lasix 20 mg PO every other day  BNP in AM  Continue Telemetry monitoring

## 2024-10-01 NOTE — DISCHARGE NOTE PROVIDER - NSDCFUADDAPPT_GEN_ALL_CORE_FT
Upon discharge you should make appointments with the following doctors:    1. Dr Bernadette Sims   2.   3.

## 2024-10-01 NOTE — DISCHARGE NOTE PROVIDER - NSDCCPCAREPLAN_GEN_ALL_CORE_FT
PRINCIPAL DISCHARGE DIAGNOSIS  Diagnosis: Rhabdomyolysis  Assessment and Plan of Treatment: You have increased levels of a muscle breakdown by product in your blood because you suffered a fall and had some time before being found at the nursing home.  For this we gave you some fluids to bring their levels down.      SECONDARY DISCHARGE DIAGNOSES  Diagnosis: Wrist fracture  Assessment and Plan of Treatment: You fell onto your right arm which caused a fracture. For this, your right arm has been put back together and is in a splint to hold your arm in place.    Diagnosis: Pneumonia  Assessment and Plan of Treatment: An image of your chest and lungs show some bacterial consolidation in the right lung. This has also caused you some difficulty breathing so you have been placed on oxygen.  For this, you should continue:  -cefpodoxime two times day for two more days   -continue O2 as needed to help with breathing      Diagnosis: Breast nodule  Assessment and Plan of Treatment: CT Chest showed a nodule in the right breast. We recommend you follow up with another breast imaging or mammogram after discharge.     PRINCIPAL DISCHARGE DIAGNOSIS  Diagnosis: Rhabdomyolysis  Assessment and Plan of Treatment: You have increased levels of a muscle breakdown by product in your blood because you suffered a fall and had some time before being found at the nursing home.  For this we gave you some fluids to bring their levels down.      SECONDARY DISCHARGE DIAGNOSES  Diagnosis: Wrist fracture  Assessment and Plan of Treatment: You fell onto your right arm which caused a fracture. For this, your right arm has been put back together and is in a splint to hold your arm in place.    Diagnosis: Pneumonia  Assessment and Plan of Treatment: An image of your chest and lungs show some bacterial consolidation in the right lung. This has also caused you some difficulty breathing so you have been placed on oxygen.  For this, you should continue:  -cefpodoxime two times day for two more days   -continue O2 as needed to help with breathing      Diagnosis: Breast nodule  Assessment and Plan of Treatment: CT Chest showed a nodule in the right breast. We recommend you follow up with another breast imaging or mammogram after discharge.    Diagnosis: Afib  Assessment and Plan of Treatment: You have an abnormal rhythm of your heart that makes you more prone to throwing clots throughout your body and that makes your heart beat faster than normal. For this, you should continue:  -Metoprolol   -Diltiazem 240mg daily     PRINCIPAL DISCHARGE DIAGNOSIS  Diagnosis: Rhabdomyolysis  Assessment and Plan of Treatment: You have increased levels of a muscle breakdown by product in your blood because you suffered a fall and had some time before being found at the nursing home.  For this we gave you some fluids to bring their levels down.      SECONDARY DISCHARGE DIAGNOSES  Diagnosis: Wrist fracture  Assessment and Plan of Treatment: You fell onto your right arm which caused a fracture. For this, your right arm has been put back together and is in a splint to hold your arm in place. You shoud follow up with Dr. Sims when you leave the hospital and keep your splint and cast dry/clean.    Diagnosis: Pneumonia  Assessment and Plan of Treatment: An image of your chest and lungs show some bacterial consolidation in the right lung. This has also caused you some difficulty breathing so you were placed on oxygen.  For this, we gave you two types of antiobiotics to clear the infection. Your lung imaging has been clean since you finished the antibiotics.       Diagnosis: Breast nodule  Assessment and Plan of Treatment: CT Chest showed a nodule in the right breast. We recommend you follow up with another breast imaging or mammogram after discharge.    Diagnosis: Afib  Assessment and Plan of Treatment: You have an abnormal rhythm of your heart that makes you more prone to throwing clots throughout your body and that makes your heart beat faster than normal. For this, you should continue:  -Metoprolol   -Diltiazem     PRINCIPAL DISCHARGE DIAGNOSIS  Diagnosis: Rhabdomyolysis  Assessment and Plan of Treatment: You have increased levels of a muscle breakdown by product in your blood because you suffered a fall and had some time before being found at the nursing home.  For this we gave you some fluids to bring their levels down.      SECONDARY DISCHARGE DIAGNOSES  Diagnosis: Wrist fracture  Assessment and Plan of Treatment: You fell onto your right arm which caused a fracture. For this, your right arm has been put back together and is in a splint to hold your arm in place. You shoud follow up with Dr. Sims when you leave the hospital and keep your splint and cast dry/clean.    Diagnosis: Pneumonia  Assessment and Plan of Treatment: An image of your chest and lungs show some bacterial consolidation in the right lung. This has also caused you some difficulty breathing so you were placed on oxygen.  For this, we gave you two types of antiobiotics to clear the infection. Your lung imaging has been clean since you finished the antibiotics.       Diagnosis: Breast nodule  Assessment and Plan of Treatment: CT Chest showed a nodule in the right breast. We recommend you follow up with another breast imaging or mammogram after discharge.    Diagnosis: Afib  Assessment and Plan of Treatment: You have an abnormal rhythm of your heart that makes you more prone to throwing clots throughout your body and that makes your heart beat faster than normal. For this, you should continue:  -Metoprolol   -Diltiazem  Not on anticoagualiton

## 2024-10-01 NOTE — CONSULT NOTE ADULT - CONSULT REASON
[facility]    [facility] Called to bedside to evaluate patient for hematemesis ~500ml. On assessment, patient awake, alert with BP 100s/60s, HR 90s. Stat Hgb, octreotide bolus followed by infusion, CTX 1g Q24h, PRBC ordered. Request large bore IV to be placed.     2200 Second bloody emesis ~500ml. IV Zofran, 2U stat PRBC, patient consented for transfusion, GI paged, discussed with fellow. BP softer 80s-90s/60s Transfer to MICU and intubation for urgent EGD. CMP, CBC, INR.      0000 1st PRBC transfusing, patient care transferred to MICU team.            
A-Fib
Right distal radius fx

## 2024-10-01 NOTE — PROGRESS NOTE ADULT - SUBJECTIVE AND OBJECTIVE BOX
89 y.o female with PMHx left breast carcinoma s/p left mastectomy, Afib on Eliquis as of 6 months ago, HTN, HLD presented to the ED following a fall at her [facility], where she fell onto her right side and had been laying on the ground for 4 hours before being found. The patient has been living at the nursing home for the past 2 years. She denies loss of consciousness and ED trauma scan was notable for a right distal radius fracture s/p reduction done by ortho service. CT chest in ED demonstrated a right middle lobe consolidation for which patient was placed on 3 day course of azithromycin and 5 day course of ceftriaxone.     INTERVAL EVENTS  Pt denies headache, chest pain, SOB. Her telemetry monitoring alarmed on 9/29 following an episode of hypotension and bradycardia with Afib converted to Aflutter responded with IVF.     HOSPITAL COURSE  Patient presented to the ED on 9/29 following a fall with labs concerning for rhabdomyolysis. Trauma scan was positive for fractured distal radius that was reduced by Ortho on 9/29. CT Chest demonstrated right middle lobe consolidation for which patient placed on antibiotics.     REVIEW OF SYSTEMS   General: No fatigue, decreased apatite, weight loss  HEENT: No cough, throat pain, rhinorrhea hemoptysis    Chest: No shortness of breath, chest pain  Abdomen: No abdominal pain, hematemesis   Genitourinary: No dysuria, No hematuria   Extremities: No joint pain, no change in range of motion  Skin: No rashes, ecchymoses purpura, nodules       MEDICATIONS  MEDICATIONS  (STANDING):  atorvastatin 20 milliGRAM(s) Oral at bedtime  azithromycin  IVPB      azithromycin  IVPB 500 milliGRAM(s) IV Intermittent every 24 hours  cefTRIAXone Injectable. 1000 milliGRAM(s) IV Push every 24 hours  cyanocobalamin 1000 MICROGram(s) Oral daily  diltiazem    milliGRAM(s) Oral daily  influenza  Vaccine (HIGH DOSE) 0.5 milliLiter(s) IntraMuscular once  lactated ringers. 1000 milliLiter(s) (100 mL/Hr) IV Continuous <Continuous>  metoprolol succinate ER 25 milliGRAM(s) Oral two times a day    MEDICATIONS  (PRN):      ALLERGIES  Allergies    No Known Allergies    Intolerances        PHYSICAL EXAM   Vital Signs Last 24 Hrs  T(C): 36.6 (01 Oct 2024 04:22), Max: 36.6 (01 Oct 2024 04:22)  T(F): 97.8 (01 Oct 2024 04:22), Max: 97.8 (01 Oct 2024 04:22)  HR: 104 (01 Oct 2024 04:22) (38 - 104)  BP: 146/78 (01 Oct 2024 04:22) (95/50 - 146/78)  BP(mean): --  RR: 17 (01 Oct 2024 04:22) (17 - 19)  SpO2: 96% (01 Oct 2024 04:22) (96% - 96%)    Parameters below as of 01 Oct 2024 04:22  Patient On (Oxygen Delivery Method): nasal cannula  O2 Flow (L/min): 2      T(C): 36.6 (10-01-24 @ 04:22), Max: 36.6 (10-01-24 @ 04:22)  HR: 104 (10-01-24 @ 04:22) (38 - 104)  BP: 146/78 (10-01-24 @ 04:22) (95/50 - 146/78)  RR: 17 (10-01-24 @ 04:22) (17 - 19)  SpO2: 96% (10-01-24 @ 04:22) (96% - 96%)    CONSTITUTIONAL: Well groomed, no apparent distress  EYES: PERRLA and symmetric, EOMI, No conjunctival or scleral injection, non-icteric  ENMT: Oral mucosa with moist membranes. Normal dentition; no pharyngeal injection or exudates             NECK: Supple, symmetric and without tracheal deviation   RESP: No respiratory distress, no use of accessory muscles; CTA b/l, no WRR  CV: RRR, +S1S2, no MRG; no JVD; no peripheral edema  GI: Soft, NT, ND, no rebound, no guarding; no palpable masses; no hepatosplenomegaly; no hernia palpated  LYMPH: No cervical LAD or tenderness; no axillary LAD or tenderness; no inguinal LAD or tenderness  MSK: Normal gait; No digital clubbing or cyanosis; examination of the (head/neck/spine/ribs/pelvis, RUE, LUE, RLE, LLE) without misalignment,            Normal ROM without pain, no spinal tenderness, normal muscle strength/tone  SKIN: No rashes or ulcers noted; no subcutaneous nodules or induration palpable  NEURO: CN II-XII intact; normal reflexes in upper and lower extremities, sensation intact in upper and lower extremities b/l to light touch   PSYCH: Appropriate insight/judgment; A+O x 3, mood and affect appropriate, recent/remote memory intact      LABS                        11.0   5.92  )-----------( 123      ( 01 Oct 2024 04:45 )             32.3       CBC Full  -  ( 01 Oct 2024 04:45 )  WBC Count : 5.92 K/uL  RBC Count : 3.82 M/uL  Hemoglobin : 11.0 g/dL  Hematocrit : 32.3 %  Platelet Count - Automated : 123 K/uL  Mean Cell Volume : 84.6 fl  Mean Cell Hemoglobin : 28.8 pg  Mean Cell Hemoglobin Concentration : 34.1 gm/dL  Auto Neutrophil # : 3.63 K/uL  Auto Lymphocyte # : 1.10 K/uL  Auto Monocyte # : 1.09 K/uL  Auto Eosinophil # : 0.02 K/uL  Auto Basophil # : 0.03 K/uL  Auto Neutrophil % : 61.4 %  Auto Lymphocyte % : 18.6 %  Auto Monocyte % : 18.4 %  Auto Eosinophil % : 0.3 %  Auto Basophil % : 0.5 %      10-01    139  |  104  |  23.1[H]  ----------------------------<  114[H]  3.9   |  21.0[L]  |  0.95    Ca    8.6      01 Oct 2024 04:45  Mg     1.9     10-01    TPro  6.2[L]  /  Alb  4.1  /  TBili  1.4  /  DBili  x   /  AST  18  /  ALT  12  /  AlkPhos  98  09-29      CAPILLARY BLOOD GLUCOSE          PT/INR - ( 29 Sep 2024 12:40 )   PT: 18.6 sec;   INR: 1.61 ratio         PTT - ( 29 Sep 2024 12:40 )  PTT:30.7 sec    Urinalysis Basic - ( 01 Oct 2024 04:45 )    Color: x / Appearance: x / SG: x / pH: x  Gluc: 114 mg/dL / Ketone: x  / Bili: x / Urobili: x   Blood: x / Protein: x / Nitrite: x   Leuk Esterase: x / RBC: x / WBC x   Sq Epi: x / Non Sq Epi: x / Bacteria: x        IMAGING          89 y.o female with PMHx left breast carcinoma s/p left mastectomy about 20 years ago, Afib on Eliquis as of 6 months ago, HTN, HLD presented to the ED following a fall at her [facility], where she fell onto her right side and had been laying on the ground for 4 hours before being found. The patient has been living at the nursing home for the past 2 years. She denies loss of consciousness and ED trauma scan was notable for a right distal radius fracture s/p reduction done by ortho service. CT chest in ED demonstrated a right middle lobe consolidation for which patient was placed on 3 day course of azithromycin and 5 day course of ceftriaxone.     INTERVAL EVENTS  Pt denies headache, chest pain, but endorses difficulty breathing that she has noticed progressively over the past 6 months. Her telemetry monitoring alarmed on 9/29 following an episode of hypotension and bradycardia with Afib converted to Aflutter responded to with IVF.     HOSPITAL COURSE  Patient presented to the ED on 9/29 following a fall with labs concerning for rhabdomyolysis. Trauma scan was positive for fractured distal radius that was reduced by Ortho on 9/29. CT Chest demonstrated right middle lobe consolidation for which patient was placed on antibiotics.     REVIEW OF SYSTEMS Endorsing SOB and left cubital bruising.   General: No fatigue, decreased apatite, weight loss  HEENT: No cough, throat pain, rhinorrhea hemoptysis    Chest: shortness of breath, no chest pain  Abdomen: No abdominal pain, hematemesis   Genitourinary: No dysuria, No hematuria   Extremities: No joint pain, no change in range of motion  Skin: No rashes, ecchymoses purpura on left cubital area      MEDICATIONS  MEDICATIONS  (STANDING):  atorvastatin 20 milliGRAM(s) Oral at bedtime  azithromycin  IVPB      azithromycin  IVPB 500 milliGRAM(s) IV Intermittent every 24 hours  cefTRIAXone Injectable. 1000 milliGRAM(s) IV Push every 24 hours  cyanocobalamin 1000 MICROGram(s) Oral daily  diltiazem    milliGRAM(s) Oral daily  influenza  Vaccine (HIGH DOSE) 0.5 milliLiter(s) IntraMuscular once  lactated ringers. 1000 milliLiter(s) (100 mL/Hr) IV Continuous <Continuous>  metoprolol succinate ER 25 milliGRAM(s) Oral two times a day    MEDICATIONS  (PRN):      ALLERGIES  Allergies    No Known Allergies    Intolerances        PHYSICAL EXAM   Vital Signs Last 24 Hrs  T(C): 36.6 (01 Oct 2024 04:22), Max: 36.6 (01 Oct 2024 04:22)  T(F): 97.8 (01 Oct 2024 04:22), Max: 97.8 (01 Oct 2024 04:22)  HR: 104 (01 Oct 2024 04:22) (38 - 104)  BP: 146/78 (01 Oct 2024 04:22) (95/50 - 146/78)  BP(mean): --  RR: 17 (01 Oct 2024 04:22) (17 - 19)  SpO2: 96% (01 Oct 2024 04:22) (96% - 96%)    Parameters below as of 01 Oct 2024 04:22  Patient On (Oxygen Delivery Method): nasal cannula  O2 Flow (L/min): 2      T(C): 36.6 (10-01-24 @ 04:22), Max: 36.6 (10-01-24 @ 04:22)  HR: 104 (10-01-24 @ 04:22) (38 - 104)  BP: 146/78 (10-01-24 @ 04:22) (95/50 - 146/78)  RR: 17 (10-01-24 @ 04:22) (17 - 19)  SpO2: 96% (10-01-24 @ 04:22) (96% - 96%)    CONSTITUTIONAL: Well groomed, no apparent distress  EYES: PERRLA and symmetric, EOMI, No conjunctival or scleral injection, non-icteric  ENMT: Oral mucosa with moist membranes. Has upper dentures   RESP: On 2L NC, no use of accessory muscles; crackles noted in the left upper lung spaces   CV: RRR, +S1S2, no MRG; no JVD; no peripheral edema  GI: Soft, NT, ND, no rebound, no guarding; no palpable masses  MSK: Right distal radius reduced and in ACE wraps +  splint  SKIN: No rashes or ulcers noted  NEURO: sensation intact in upper and lower extremities b/l to light touch   PSYCH: Appropriate insight/judgment; A+O x 4, mood and affect appropriate, recent/remote memory intact      LABS                        11.0   5.92  )-----------( 123      ( 01 Oct 2024 04:45 )             32.3       CBC Full  -  ( 01 Oct 2024 04:45 )  WBC Count : 5.92 K/uL  RBC Count : 3.82 M/uL  Hemoglobin : 11.0 g/dL  Hematocrit : 32.3 %  Platelet Count - Automated : 123 K/uL  Mean Cell Volume : 84.6 fl  Mean Cell Hemoglobin : 28.8 pg  Mean Cell Hemoglobin Concentration : 34.1 gm/dL  Auto Neutrophil # : 3.63 K/uL  Auto Lymphocyte # : 1.10 K/uL  Auto Monocyte # : 1.09 K/uL  Auto Eosinophil # : 0.02 K/uL  Auto Basophil # : 0.03 K/uL  Auto Neutrophil % : 61.4 %  Auto Lymphocyte % : 18.6 %  Auto Monocyte % : 18.4 %  Auto Eosinophil % : 0.3 %  Auto Basophil % : 0.5 %      10-01    139  |  104  |  23.1[H]  ----------------------------<  114[H]  3.9   |  21.0[L]  |  0.95    Ca    8.6      01 Oct 2024 04:45  Mg     1.9     10-01    TPro  6.2[L]  /  Alb  4.1  /  TBili  1.4  /  DBili  x   /  AST  18  /  ALT  12  /  AlkPhos  98  09-29      CAPILLARY BLOOD GLUCOSE          PT/INR - ( 29 Sep 2024 12:40 )   PT: 18.6 sec;   INR: 1.61 ratio         PTT - ( 29 Sep 2024 12:40 )  PTT:30.7 sec    Urinalysis Basic - ( 01 Oct 2024 04:45 )    Color: x / Appearance: x / SG: x / pH: x  Gluc: 114 mg/dL / Ketone: x  / Bili: x / Urobili: x   Blood: x / Protein: x / Nitrite: x   Leuk Esterase: x / RBC: x / WBC x   Sq Epi: x / Non Sq Epi: x / Bacteria: x        IMAGING     < from: Xray Wrist 3 Views, Right (09.29.24 @ 13:43) >    PROCEDUREDATE:  09/29/2024          INTERPRETATION:  Clinical Information: Trauma    Technique: 2 views right forearm. 3 views right wrist. 3 views right   wrist post reduction    Comparison: None    Findings/  Impression: Acute, comminuted, severely displaced, an intra-articular   fracture of the distal right radius. No dislocation. Acute obliquely   oriented moderately displaced fracture of the distal right ulna   metaphysis. Improved alignment status post reduction.    --- End of Report ---    < end of copied text >  < from: CT Abdomen and Pelvis w/ IV Cont (09.29.24 @ 11:24) >    PROCEDURE DATE:  09/29/2024          INTERPRETATION:  CLINICAL INFORMATION: Status post fall.    COMPARISON: CTA chest 5/9/2023.    CONTRAST/COMPLICATIONS:  IV Contrast: Omnipaque 350   80 cc administered   20 cc discarded  Oral Contrast: NONE  Complications: None reported at time of study completion    PROCEDURE:  CT of the Chest, Abdomen and Pelvis was performed.  Sagittal and coronal reformats were performed.    FINDINGS:  CHEST:  LUNGS AND LARGE AIRWAYS: Patent central airways. Right middle lobe focal   consolidative opacity. Bibasilar subsegmental atelectasis.  PLEURA: Small bilateral pleural effusions.  VESSELS: Coronary artery and thoracic aortic calcifications. Tortuous   course of the right common carotid artery located in the posterior   pharynx.  HEART: Heart size is normal. No pericardial effusion.  MEDIASTINUM AND ODESSA: No lymphadenopathy.  CHEST WALL AND LOWER NECK: An indeterminant hyperattenuating 1.4 cm right   breast nodule. Left cystectomy.    ABDOMEN AND PELVIS:  LIVER: Within normal limits.  BILE DUCTS: Normal caliber.  GALLBLADDER: Within normal limits.  SPLEEN: Within normal limits.  PANCREAS: Within normal limits.  ADRENALS: Interval increase in size of the right adrenal indeterminate   mass measuring 3.5 x 3.3 cm, previously 2.8 x 2.7 cm.  KIDNEYS/URETERS: Bilateral renal simple cysts. Additional subcentimeter   hypodense foci, too small to characterize. No hydronephrosis. A right   renal nonobstructive stones, 0.2 cm. Symmetric renal enhancement.    BLADDER: Within normal limits.  REPRODUCTIVE ORGANS: Uterus and adnexa within normal limits.    BOWEL: No bowel obstruction. Colonic diverticulosis. Appendix is normal.  PERITONEUM/RETROPERITONEUM: Within normal limits.  VESSELS: Atherosclerotic changes.  LYMPH NODES: No lymphadenopathy.  ABDOMINAL WALL: Small fat-containing periumbilical hernia.  BONES: Chronic fracture deformities of a few left posterior ribs.   Osteopenia. Degenerative changes of the spine.    IMPRESSION:  No CT evidence of acute traumatic injury.  Right middle lobe focal consolidative opacity. Differentials include   atelectasis versus early infection.  An indeterminant hyperattenuating 1.4 cm right breast nodule. Follow-up   imaging can be considered at the dedicated breast imaging center.  Interval increase in size of the right adrenal indeterminate mass.   Recommend further evaluation with MR abdomen.        --- End of Report ---        < end of copied text >  < from: CT Cervical Spine No Cont (09.29.24 @ 11:17) >    PROCEDURE DATE:  09/29/2024          INTERPRETATION:  CLINICAL INFORMATION: Fall on Eliquis    COMPARISON: CT head and cervical spine 6/18/2024.    CONTRAST:  IV Contrast: NONE  Complications: None reported at time of study completion    TECHNIQUE:    CT BRAIN: Serial axial images were obtained from the skull base to the   vertex using multi-slice helical technique. Sagittal and coronal   reformats were obtained.    CT CERVICAL SPINE: Axial images were obtained of the cervical spine using   multislice helical technique. Reformatted coronal and sagittal images   were obtained.    FINDINGS:    CT BRAIN:    VENTRICLES AND SULCI: Age appropriate involutional changes.  INTRA-AXIAL: No mass effect, acute hemorrhage, or midline shift.  There   are periventricular and subcortical white matter hypodensities,   consistent with microvascular type changes.  EXTRA-AXIAL: No fluid collection. Basal cisterns are normal in   appearance. Multiple dural based calcifications/meningiomas are noted   stable from prior exam.    VISUALIZED SINUSES:  Scattered mucosal thickening.  TYMPANOMASTOID CAVITIES:  Partial opacification of the right mastoid air   cells  VISUALIZED ORBITS: Stable 1 cm nodule in the left orbit consistent with   incidental venous malformation.  CALVARIUM: No acute fracture.      CT CERVICAL SPINE:    VERTEBRAE:  Normal in height. No acute fracture. Multilevel degenerative   changes including marginal osteophytes.  ALIGNMENT: There is reversal the normal cervical lordosis. There is mild   anterolisthesis C7-T1 which is unchanged from prior exam  INTERVERTEBRAL DISC SPACES: There are multilevel disc osteophyte complex.   There is resultant moderate spinal canal stenosis at C4-5 through C6-7.   Multilevel neural foraminal narrowing secondary to uncovertebral and   facet hypertrophy.  VISUALIZED LUNGS: There are small bilateral pleural effusions.    MISCELLANEOUS:  Small volume retropharyngeal edema slightly more   prominent on the right compared to the left. There is retropharyngeal   course of the right common and proximal internal carotid arteries.      IMPRESSION:    CT HEAD:  No acute intracranial hemorrhage or calvarial fracture.    CT CERVICAL SPINE:  No evidence of acute fracture or malalignment. There is mild   retropharyngeal edema which is new from prior exam which is nonspecific.   Given the presence of new small bilateral pleuraleffusions, this may   represent third spacing of fluid. However, other etiologies would include   traumatic/ligamentous injury, infection or inflammation. Clinical   correlation with patient's symptoms is recommended. If warranted, further   evaluated with dedicated MRI cervical spine can be obtained.      --- End of Report ---        < end of copied text >

## 2024-10-01 NOTE — DISCHARGE NOTE PROVIDER - NSDCCPTREATMENT_GEN_ALL_CORE_FT
PRINCIPAL PROCEDURE  Procedure: Closed reduction with cast  Findings and Treatment: You fell onto your right arm which caused a fracture. You were seen by the bone doctors who gave you some numbing medication before pushing the two ends of your arm bone back together. They placed your arm in a sling and cast to keep it stable.

## 2024-10-01 NOTE — DISCHARGE NOTE NURSING/CASE MANAGEMENT/SOCIAL WORK - PATIENT PORTAL LINK FT
You can access the FollowMyHealth Patient Portal offered by [facility] by registering at the following website: [email or web address]. By joining [facility]’s FollowMyHealth portal, you will also be able to view your health information using other applications (apps) compatible with our system.

## 2024-10-01 NOTE — DISCHARGE NOTE PROVIDER - ATTENDING DISCHARGE PHYSICAL EXAMINATION:
T(C): 36.6 (10-01-24 @ 04:22), Max: 36.6 (10-01-24 @ 04:22)  HR: 104 (10-01-24 @ 04:22) (38 - 104)  BP: 146/78 (10-01-24 @ 04:22) (95/50 - 146/78)  RR: 17 (10-01-24 @ 04:22) (17 - 19)  SpO2: 96% (10-01-24 @ 04:22) (96% - 96%)    CONSTITUTIONAL: Well groomed, no apparent distress  EYES: PERRLA and symmetric, EOMI, No conjunctival or scleral injection, non-icteric  ENMT: Oral mucosa with moist membranes. Has upper dentures   RESP: On 2L NC, no use of accessory muscles; crackles noted in the left upper lung spaces   CV: RRR, +S1S2, no MRG; no JVD; no peripheral edema  GI: Soft, NT, ND, no rebound, no guarding; no palpable masses  MSK: Right distal radius reduced and in ACE wraps +  splint  SKIN: No rashes or ulcers noted  NEURO: sensation intact in upper and lower extremities b/l to light touch   PSYCH: Appropriate insight/judgment; A+O x 4, mood and affect appropriate, recent/remote memory intact T(C): 36.4 (10-08-24 @ 04:39), Max: 36.7 (10-07-24 @ 16:45)  HR: 60 (10-08-24 @ 04:39) (60 - 68)  BP: 138/80 (10-08-24 @ 04:39) (123/69 - 138/80)  RR: 18 (10-08-24 @ 04:39) (18 - 18)  SpO2: 96% (10-08-24 @ 04:39) (95% - 97%)    CONSTITUTIONAL: Well groomed, no apparent distress  EYES: PERRLA and symmetric, EOMI, No conjunctival or scleral injection  ENMT: Oral mucosa with moist membranes. Normal dentition  NECK: Right retro SCM lipoma, neck without tracheal deviation   RESP: No respiratory distress, no use of accessory muscles; CTA b/l, no WRR  CV: RRR, +S1S2, no MRG; no JVD; no peripheral edema  GI: Soft, NT, ND, no rebound, no guarding  SKIN: No rashes or ulcers noted  NEURO: sensation intact in upper and lower extremities b/l to light touch   PSYCH: Appropriate insight/judgment; A+O x 4, mood and affect appropriate, recent/remote memory intact

## 2024-10-01 NOTE — DISCHARGE NOTE NURSING/CASE MANAGEMENT/SOCIAL WORK - NSDCVIVACCINE_GEN_ALL_CORE_FT
Tdap; 29-Sep-2024 12:34; Liyah Navarro (RN); [employer]; [id number] (Exp. Date: 28-Feb-2026); IntraMuscular; Vastus Lateralis Left.; 0.5 milliLiter(s); VIS (VIS Published: 09-May-2013, VIS Presented: 29-Sep-2024);

## 2024-10-02 LAB
ALBUMIN SERPL ELPH-MCNC: 3.4 G/DL — SIGNIFICANT CHANGE UP (ref 3.3–5.2)
ALP SERPL-CCNC: 78 U/L — SIGNIFICANT CHANGE UP (ref 40–120)
ALT FLD-CCNC: 15 U/L — SIGNIFICANT CHANGE UP
ANION GAP SERPL CALC-SCNC: 11 MMOL/L — SIGNIFICANT CHANGE UP (ref 5–17)
AST SERPL-CCNC: 21 U/L — SIGNIFICANT CHANGE UP
BILIRUB SERPL-MCNC: 0.8 MG/DL — SIGNIFICANT CHANGE UP (ref 0.4–2)
BUN SERPL-MCNC: 18.6 MG/DL — SIGNIFICANT CHANGE UP (ref 8–20)
CALCIUM SERPL-MCNC: 8.4 MG/DL — SIGNIFICANT CHANGE UP (ref 8.4–10.5)
CHLORIDE SERPL-SCNC: 106 MMOL/L — SIGNIFICANT CHANGE UP (ref 96–108)
CK MB CFR SERPL CALC: 5.2 NG/ML — SIGNIFICANT CHANGE UP (ref 0–6.7)
CK SERPL-CCNC: 188 U/L — HIGH (ref 25–170)
CO2 SERPL-SCNC: 24 MMOL/L — SIGNIFICANT CHANGE UP (ref 22–29)
CREAT SERPL-MCNC: 0.66 MG/DL — SIGNIFICANT CHANGE UP (ref 0.5–1.3)
EGFR: 82 ML/MIN/1.73M2 — SIGNIFICANT CHANGE UP
GLUCOSE SERPL-MCNC: 86 MG/DL — SIGNIFICANT CHANGE UP (ref 70–99)
HCT VFR BLD CALC: 30.9 % — LOW (ref 34.5–45)
HGB BLD-MCNC: 10.3 G/DL — LOW (ref 11.5–15.5)
MAGNESIUM SERPL-MCNC: 1.9 MG/DL — SIGNIFICANT CHANGE UP (ref 1.6–2.6)
MCHC RBC-ENTMCNC: 28.6 PG — SIGNIFICANT CHANGE UP (ref 27–34)
MCHC RBC-ENTMCNC: 33.3 GM/DL — SIGNIFICANT CHANGE UP (ref 32–36)
MCV RBC AUTO: 85.8 FL — SIGNIFICANT CHANGE UP (ref 80–100)
NT-PROBNP SERPL-SCNC: 5090 PG/ML — HIGH (ref 0–300)
PHOSPHATE SERPL-MCNC: 2.8 MG/DL — SIGNIFICANT CHANGE UP (ref 2.4–4.7)
PLATELET # BLD AUTO: 103 K/UL — LOW (ref 150–400)
POTASSIUM SERPL-MCNC: 3.7 MMOL/L — SIGNIFICANT CHANGE UP (ref 3.5–5.3)
POTASSIUM SERPL-SCNC: 3.7 MMOL/L — SIGNIFICANT CHANGE UP (ref 3.5–5.3)
PROT SERPL-MCNC: 5.1 G/DL — LOW (ref 6.6–8.7)
RBC # BLD: 3.6 M/UL — LOW (ref 3.8–5.2)
RBC # FLD: 14.6 % — HIGH (ref 10.3–14.5)
SODIUM SERPL-SCNC: 141 MMOL/L — SIGNIFICANT CHANGE UP (ref 135–145)
WBC # BLD: 4.03 K/UL — SIGNIFICANT CHANGE UP (ref 3.8–10.5)
WBC # FLD AUTO: 4.03 K/UL — SIGNIFICANT CHANGE UP (ref 3.8–10.5)

## 2024-10-02 PROCEDURE — 99232 SBSQ HOSP IP/OBS MODERATE 35: CPT

## 2024-10-02 RX ORDER — METOPROLOL TARTRATE 50 MG
50 TABLET ORAL
Refills: 0 | Status: DISCONTINUED | OUTPATIENT
Start: 2024-10-03 | End: 2024-10-08

## 2024-10-02 RX ORDER — SENNOSIDES 8.6 MG
2 TABLET ORAL AT BEDTIME
Refills: 0 | Status: DISCONTINUED | OUTPATIENT
Start: 2024-10-02 | End: 2024-10-08

## 2024-10-02 RX ORDER — FUROSEMIDE 10 MG/ML
40 INJECTION INTRAVENOUS DAILY
Refills: 0 | Status: DISCONTINUED | OUTPATIENT
Start: 2024-10-02 | End: 2024-10-05

## 2024-10-02 RX ORDER — ACETAMINOPHEN 325 MG
650 TABLET ORAL EVERY 6 HOURS
Refills: 0 | Status: DISCONTINUED | OUTPATIENT
Start: 2024-10-02 | End: 2024-10-07

## 2024-10-02 RX ORDER — DILTIAZEM HCL 300 MG
120 CAPSULE, EXTENDED RELEASE 24HR ORAL DAILY
Refills: 0 | Status: DISCONTINUED | OUTPATIENT
Start: 2024-10-03 | End: 2024-10-04

## 2024-10-02 RX ADMIN — Medication 1000 MICROGRAM(S): at 11:13

## 2024-10-02 RX ADMIN — AZITHROMYCIN 255 MILLIGRAM(S): 250 TABLET, FILM COATED ORAL at 16:33

## 2024-10-02 RX ADMIN — Medication 25 MILLIGRAM(S): at 06:03

## 2024-10-02 RX ADMIN — Medication 1000 MILLIGRAM(S): at 11:13

## 2024-10-02 RX ADMIN — Medication 650 MILLIGRAM(S): at 21:19

## 2024-10-02 RX ADMIN — ATORVASTATIN CALCIUM 20 MILLIGRAM(S): 10 TABLET, FILM COATED ORAL at 21:18

## 2024-10-02 RX ADMIN — Medication 2 TABLET(S): at 22:50

## 2024-10-02 RX ADMIN — Medication 240 MILLIGRAM(S): at 06:03

## 2024-10-02 RX ADMIN — Medication 650 MILLIGRAM(S): at 21:17

## 2024-10-02 RX ADMIN — Medication 650 MILLIGRAM(S): at 10:00

## 2024-10-02 RX ADMIN — Medication 17 GRAM(S): at 22:50

## 2024-10-02 RX ADMIN — Medication 650 MILLIGRAM(S): at 08:54

## 2024-10-02 RX ADMIN — FUROSEMIDE 20 MILLIGRAM(S): 10 INJECTION INTRAVENOUS at 06:12

## 2024-10-02 RX ADMIN — Medication 40 MILLIEQUIVALENT(S): at 16:32

## 2024-10-02 NOTE — PROGRESS NOTE ADULT - SUBJECTIVE AND OBJECTIVE BOX
HPI  89 y.o female with PMHx left breast carcinoma s/p left mastectomy about 20 years ago, Afib on Eliquis as of 6 months ago, HTN, HLD presented to the ED following a fall at her [facility] home, where she fell onto her right side and had been laying on the ground for 4 hours before being found. The patient has been living at the nursing home for the past 2 years. She denies loss of consciousness and ED trauma scan was notable for a right distal radius fracture s/p reduction done by ortho service. CT chest in ED demonstrated a right middle lobe consolidation for which patient was placed on 3 day course of azithromycin and 5 day course of ceftriaxone.     INTERVAL EVENTS  Pt was seen by cardiology following her telemetry alarm on 9/29, recommending discontinue of Eliquis following her fall in addition to furosemide 20mg PO every other day. In the morning, pt was tachycardic with rates in the 130s. Patient received her scheduled Cardizem and metoprolol.        HOSPITAL COURSE  Patient presented to the ED on 9/29 following a fall with labs concerning for rhabdomyolysis. Trauma scan was positive for fractured distal radius that was reduced by Ortho on 9/29. CT Chest demonstrated right middle lobe consolidation for which patient was placed on antibiotics. Her telemetry monitoring alarmed on 9/29 following an episode of hypotension and bradycardia with Afib converted to Aflutter responded to with IVF.       REVIEW OF SYSTEMS Patient reports no increased SOB, chest pain, or palpitations. Endorses right arm pain for which she was given Tylenol.   General: No fatigue, decreased apatite, weight loss  HEENT: No cough, throat pain, rhinorrhea hemoptysis    Chest: No shortness of breath, chest pain  Abdomen: No abdominal pain, hematemesis   Genitourinary: No dysuria, No hematuria   Extremities: Right arm pain, no change in range of motion  Skin: No rashes, ecchymoses purpura, nodules       MEDICATIONS  MEDICATIONS  (STANDING):  atorvastatin 20 milliGRAM(s) Oral at bedtime  azithromycin  IVPB      azithromycin  IVPB 500 milliGRAM(s) IV Intermittent every 24 hours  cefTRIAXone Injectable. 1000 milliGRAM(s) IV Push every 24 hours  cyanocobalamin 1000 MICROGram(s) Oral daily  diltiazem    milliGRAM(s) Oral daily  furosemide    Tablet 20 milliGRAM(s) Oral <User Schedule>  influenza  Vaccine (HIGH DOSE) 0.5 milliLiter(s) IntraMuscular once  lactated ringers. 1000 milliLiter(s) (100 mL/Hr) IV Continuous <Continuous>  metoprolol succinate ER 25 milliGRAM(s) Oral two times a day    MEDICATIONS  (PRN):  acetaminophen     Tablet .. 650 milliGRAM(s) Oral every 6 hours PRN Moderate Pain (4 - 6)      ALLERGIES  Allergies    No Known Allergies    Intolerances        PHYSICAL EXAM   Vital Signs Last 24 Hrs  T(C): 36.9 (02 Oct 2024 08:29), Max: 36.9 (02 Oct 2024 08:29)  T(F): 98.5 (02 Oct 2024 08:29), Max: 98.5 (02 Oct 2024 08:29)  HR: 109 (02 Oct 2024 08:29) (82 - 109)  BP: 117/86 (02 Oct 2024 08:29) (114/76 - 142/79)  BP(mean): --  RR: 18 (02 Oct 2024 08:29) (17 - 19)  SpO2: 98% (02 Oct 2024 08:29) (95% - 98%)    Parameters below as of 02 Oct 2024 08:29  Patient On (Oxygen Delivery Method): nasal cannula  O2 Flow (L/min): 2      T(C): 36.9 (10-02-24 @ 08:29), Max: 36.9 (10-02-24 @ 08:29)  HR: 109 (10-02-24 @ 08:29) (82 - 109)  BP: 117/86 (10-02-24 @ 08:29) (114/76 - 142/79)  RR: 18 (10-02-24 @ 08:29) (17 - 19)  SpO2: 98% (10-02-24 @ 08:29) (95% - 98%)    CONSTITUTIONAL: Well groomed, no apparent distress  EYES:  EOMI, No conjunctival or scleral injection, non-icteric  ENMT: Lower dentures removed on bedside table   NECK: without tracheal deviation   RESP: On 2L NC. No respiratory distress, no use of accessory muscles; CTA b/l, no WRR  CV: RRR, +S1S2, no MRG; no JVD; no peripheral edema  GI: Soft, NT, ND, no rebound, no guarding  MSK: Left LE showing more muscular atrophy compared to RLE.  SKIN :Varicose veins of anterior LE   NEURO: sensation intact in upper and lower extremities b/l to light touch   PSYCH: Appropriate insight/judgment; A+O x 4, mood and affect appropriate, recent/remote memory intact      LABS                        10.3   4.03  )-----------( 103      ( 02 Oct 2024 05:40 )             30.9       CBC Full  -  ( 02 Oct 2024 05:40 )  WBC Count : 4.03 K/uL  RBC Count : 3.60 M/uL  Hemoglobin : 10.3 g/dL  Hematocrit : 30.9 %  Platelet Count - Automated : 103 K/uL  Mean Cell Volume : 85.8 fl  Mean Cell Hemoglobin : 28.6 pg  Mean Cell Hemoglobin Concentration : 33.3 gm/dL  Auto Neutrophil # : x  Auto Lymphocyte # : x  Auto Monocyte # : x  Auto Eosinophil # : x  Auto Basophil # : x  Auto Neutrophil % : x  Auto Lymphocyte % : x  Auto Monocyte % : x  Auto Eosinophil % : x  Auto Basophil % : x      10-02    141  |  106  |  18.6  ----------------------------<  86  3.7   |  24.0  |  0.66    Ca    8.4      02 Oct 2024 05:40  Phos  2.8     10-02  Mg     1.9     10-02    TPro  5.1[L]  /  Alb  3.4  /  TBili  0.8  /  DBili  x   /  AST  21  /  ALT  15  /  AlkPhos  78  10-02      CAPILLARY BLOOD GLUCOSE              Urinalysis Basic - ( 02 Oct 2024 05:40 )    Color: x / Appearance: x / SG: x / pH: x  Gluc: 86 mg/dL / Ketone: x  / Bili: x / Urobili: x   Blood: x / Protein: x / Nitrite: x   Leuk Esterase: x / RBC: x / WBC x   Sq Epi: x / Non Sq Epi: x / Bacteria: x        IMAGING

## 2024-10-02 NOTE — PROGRESS NOTE ADULT - ASSESSMENT
89 y.o female with PMHx left breast carcinoma s/p left mastectomy about 20 years ago, Afib on Eliquis as of 6 months ago, HTN, HLD presented to the ED following a fall at her [facility] home. CT Chest notable for right middle lobe consolidation.       #Mechanical Fall   -CT head negative for fracture and hemorrhage   -PT consult recommending Campos placement      #Rhabdomyolysis   -Initial -704, currently downtrending   -IVF   -serial monitoring     #Right distal radius fracture   -reduced by ortho 9/29  -acetaminophen 650mg prn for pain    #Community Acquired PNA  -CT Chest showing right middle lobe focal consolidation  -on 2L NC  -Azithromycin 500mg 3 days (End date: 10/1/24)  -Ceftriaxone 1g daily 5 days (End date: 10/3/24)      #Afib  -Telemetry monitoring   -Metoprolol succinate 25mg BID  -Diltiazem 240 mg daily  -Eliquis discontinued due to recent fall      #HTN  -diltiazem 240 mg daily, per cardiology recs     #HLD   -atorvastatin 20mg bedtime     DVT ppx: SCDs  Dispo: D/c to [facility] once medically stable

## 2024-10-02 NOTE — PROGRESS NOTE ADULT - SUBJECTIVE AND OBJECTIVE BOX
INTERVAL HISTORY:  Sitting up in bed awake and alert  Denies chest pain and SOB  	  MEDICATIONS:  furosemide   Injectable 40 milliGRAM(s) IV Push daily  metoprolol succinate ER 25 milliGRAM(s) Oral two times a day  azithromycin  IVPB      azithromycin  IVPB 500 milliGRAM(s) IV Intermittent every 24 hours  cefTRIAXone Injectable. 1000 milliGRAM(s) IV Push every 24 hours  acetaminophen     Tablet .. 650 milliGRAM(s) Oral every 6 hours PRN  atorvastatin 20 milliGRAM(s) Oral at bedtime  cyanocobalamin 1000 MICROGram(s) Oral daily  influenza  Vaccine (HIGH DOSE) 0.5 milliLiter(s) IntraMuscular once  lactated ringers. 1000 milliLiter(s) IV Continuous <Continuous>  potassium chloride   Powder 40 milliEquivalent(s) Oral once        PHYSICAL EXAM:    T(C): 36.9 (10-02-24 @ 08:29), Max: 36.9 (10-02-24 @ 08:29)  HR: 109 (10-02-24 @ 08:29) (82 - 109)  BP: 117/86 (10-02-24 @ 08:29) (114/76 - 142/79)  RR: 18 (10-02-24 @ 08:29) (17 - 19)  SpO2: 98% (10-02-24 @ 08:29) (95% - 98%)  Wt(kg): --    I&O's Summary    02 Oct 2024 07:01  -  02 Oct 2024 15:19  --------------------------------------------------------  IN: 0 mL / OUT: 1200 mL / NET: -1200 mL        Daily     Daily     Appearance: Normal	  HEENT:   Normal oral mucosa, PERRL, EOMI	  Cardiovascular: Normal S1 S2, No JVD, 1/6 Systolic murmurs, No edema  Respiratory: Lungs with bibasilar rales and few scattered wheezes	  Psychiatry: A & O x 3, Mood & affect appropriate  Gastrointestinal:  Soft, Non-tender, + BS	  Skin: No rashes, No ecchymoses, No cyanosis  Neurologic: Non-focal  Extremities: Normal range of motion, Right distal radius reduced and in ACE wraps + splint, No clubbing, cyanosis or edema  Vascular: Peripheral pulses bilaterally        TELEMETRY: A-Fib/Flutter HR 's	    	      LABS:	 	    CARDIAC MARKERS:                          10.3   4.03  )-----------( 103      ( 02 Oct 2024 05:40 )             30.9     10-02    141  |  106  |  18.6  ----------------------------<  86  3.7   |  24.0  |  0.66    Ca    8.4      02 Oct 2024 05:40  Phos  2.8     10-02  Mg     1.9     10-02    TPro  5.1[L]  /  Alb  3.4  /  TBili  0.8  /  DBili  x   /  AST  21  /  ALT  15  /  AlkPhos  78  10-02    proBNP: 5090      ASSESSMENT: 	  89 y.o female with PMHx left breast carcinoma s/p left mastectomy about 20 years ago, Afib on Eliquis as of 6 months ago, HTN, HLD presented to the ED following a fall at her [facility] home, where she fell onto her right side and had been laying on the ground for 4 hours before being found. The patient has been living at the nursing home for the past 2 years. She denies loss of consciousness and ED trauma scan was notable for a right distal radius fracture s/p reduction done by ortho service. CT chest in ED demonstrated a right middle lobe consolidation for which patient was placed on 3 day course of azithromycin and 5 day course of ceftriaxone.    - Denies chest pain and SOB, O2 Sat's stable on O2 N/C  - Remains A-Fib/ Flutter with HR in the 130's this AM, after receiving Diltiazem  mg and metoprolol this AM HR now 40's-60's. Will reduce Diltiazem CD to 120 mg daily and increase Toprol XL to 50 mg BID  - BNP elevated 5090, lung sounds with bibasilar rales, will change Lasix to 40 mg IVP daily for now, and supplemented K+ , CXR in AM      PLAN:  S/p Mechanical fall/ A-Fib  A-Fib anticoagulation therapy is now contraindicated as Pt is S/p multiple recent falls  Decrease Diltiazem CD to 120 mg PO daily  Increase Toprol XL to 50 mg BID  Lasix 40 mg IVP daily  K-Karlene 40 meq PO x 1   BMP in AM  CXR in AM

## 2024-10-03 LAB
ALBUMIN SERPL ELPH-MCNC: 3.7 G/DL — SIGNIFICANT CHANGE UP (ref 3.3–5.2)
ALP SERPL-CCNC: 90 U/L — SIGNIFICANT CHANGE UP (ref 40–120)
ALT FLD-CCNC: 18 U/L — SIGNIFICANT CHANGE UP
ANION GAP SERPL CALC-SCNC: 11 MMOL/L — SIGNIFICANT CHANGE UP (ref 5–17)
AST SERPL-CCNC: 18 U/L — SIGNIFICANT CHANGE UP
BASOPHILS # BLD AUTO: 0.02 K/UL — SIGNIFICANT CHANGE UP (ref 0–0.2)
BASOPHILS NFR BLD AUTO: 0.5 % — SIGNIFICANT CHANGE UP (ref 0–2)
BILIRUB SERPL-MCNC: 0.8 MG/DL — SIGNIFICANT CHANGE UP (ref 0.4–2)
BUN SERPL-MCNC: 17.2 MG/DL — SIGNIFICANT CHANGE UP (ref 8–20)
CALCIUM SERPL-MCNC: 8.7 MG/DL — SIGNIFICANT CHANGE UP (ref 8.4–10.5)
CHLORIDE SERPL-SCNC: 100 MMOL/L — SIGNIFICANT CHANGE UP (ref 96–108)
CO2 SERPL-SCNC: 28 MMOL/L — SIGNIFICANT CHANGE UP (ref 22–29)
CREAT SERPL-MCNC: 0.81 MG/DL — SIGNIFICANT CHANGE UP (ref 0.5–1.3)
EGFR: 68 ML/MIN/1.73M2 — SIGNIFICANT CHANGE UP
EOSINOPHIL # BLD AUTO: 0.05 K/UL — SIGNIFICANT CHANGE UP (ref 0–0.5)
EOSINOPHIL NFR BLD AUTO: 1.2 % — SIGNIFICANT CHANGE UP (ref 0–6)
GLUCOSE SERPL-MCNC: 120 MG/DL — HIGH (ref 70–99)
HCT VFR BLD CALC: 35.4 % — SIGNIFICANT CHANGE UP (ref 34.5–45)
HGB BLD-MCNC: 11.6 G/DL — SIGNIFICANT CHANGE UP (ref 11.5–15.5)
IMM GRANULOCYTES NFR BLD AUTO: 0.7 % — SIGNIFICANT CHANGE UP (ref 0–0.9)
LYMPHOCYTES # BLD AUTO: 0.93 K/UL — LOW (ref 1–3.3)
LYMPHOCYTES # BLD AUTO: 21.7 % — SIGNIFICANT CHANGE UP (ref 13–44)
MAGNESIUM SERPL-MCNC: 1.9 MG/DL — SIGNIFICANT CHANGE UP (ref 1.6–2.6)
MCHC RBC-ENTMCNC: 28 PG — SIGNIFICANT CHANGE UP (ref 27–34)
MCHC RBC-ENTMCNC: 32.8 GM/DL — SIGNIFICANT CHANGE UP (ref 32–36)
MCV RBC AUTO: 85.5 FL — SIGNIFICANT CHANGE UP (ref 80–100)
MONOCYTES # BLD AUTO: 0.72 K/UL — SIGNIFICANT CHANGE UP (ref 0–0.9)
MONOCYTES NFR BLD AUTO: 16.8 % — HIGH (ref 2–14)
NEUTROPHILS # BLD AUTO: 2.53 K/UL — SIGNIFICANT CHANGE UP (ref 1.8–7.4)
NEUTROPHILS NFR BLD AUTO: 59.1 % — SIGNIFICANT CHANGE UP (ref 43–77)
PHOSPHATE SERPL-MCNC: 3.2 MG/DL — SIGNIFICANT CHANGE UP (ref 2.4–4.7)
PLATELET # BLD AUTO: 138 K/UL — LOW (ref 150–400)
POTASSIUM SERPL-MCNC: 3.7 MMOL/L — SIGNIFICANT CHANGE UP (ref 3.5–5.3)
POTASSIUM SERPL-SCNC: 3.7 MMOL/L — SIGNIFICANT CHANGE UP (ref 3.5–5.3)
PROT SERPL-MCNC: 5.8 G/DL — LOW (ref 6.6–8.7)
RBC # BLD: 4.14 M/UL — SIGNIFICANT CHANGE UP (ref 3.8–5.2)
RBC # FLD: 14.6 % — HIGH (ref 10.3–14.5)
SODIUM SERPL-SCNC: 139 MMOL/L — SIGNIFICANT CHANGE UP (ref 135–145)
WBC # BLD: 4.28 K/UL — SIGNIFICANT CHANGE UP (ref 3.8–10.5)
WBC # FLD AUTO: 4.28 K/UL — SIGNIFICANT CHANGE UP (ref 3.8–10.5)

## 2024-10-03 PROCEDURE — 74018 RADEX ABDOMEN 1 VIEW: CPT | Mod: 26

## 2024-10-03 PROCEDURE — 71045 X-RAY EXAM CHEST 1 VIEW: CPT | Mod: 26

## 2024-10-03 PROCEDURE — 99232 SBSQ HOSP IP/OBS MODERATE 35: CPT

## 2024-10-03 RX ADMIN — Medication 2 TABLET(S): at 21:38

## 2024-10-03 RX ADMIN — Medication 1000 MICROGRAM(S): at 11:13

## 2024-10-03 RX ADMIN — Medication 1000 MILLIGRAM(S): at 11:13

## 2024-10-03 RX ADMIN — Medication 50 MILLIGRAM(S): at 05:01

## 2024-10-03 RX ADMIN — Medication 650 MILLIGRAM(S): at 17:26

## 2024-10-03 RX ADMIN — Medication 120 MILLIGRAM(S): at 05:01

## 2024-10-03 RX ADMIN — Medication 50 MILLIGRAM(S): at 17:26

## 2024-10-03 RX ADMIN — Medication 17 GRAM(S): at 17:25

## 2024-10-03 RX ADMIN — Medication 650 MILLIGRAM(S): at 18:00

## 2024-10-03 RX ADMIN — FUROSEMIDE 40 MILLIGRAM(S): 10 INJECTION INTRAVENOUS at 05:01

## 2024-10-03 RX ADMIN — ATORVASTATIN CALCIUM 20 MILLIGRAM(S): 10 TABLET, FILM COATED ORAL at 21:38

## 2024-10-03 RX ADMIN — Medication 650 MILLIGRAM(S): at 05:39

## 2024-10-03 NOTE — PROGRESS NOTE ADULT - SUBJECTIVE AND OBJECTIVE BOX
S: Patients breathing improved. She is on 2 LNCO2    TELEMETRY: afib    MEDICATIONS  (STANDING):  atorvastatin 20 milliGRAM(s) Oral at bedtime  azithromycin  IVPB      azithromycin  IVPB 500 milliGRAM(s) IV Intermittent every 24 hours  cefTRIAXone Injectable. 1000 milliGRAM(s) IV Push every 24 hours  cyanocobalamin 1000 MICROGram(s) Oral daily  diltiazem    milliGRAM(s) Oral daily  furosemide   Injectable 40 milliGRAM(s) IV Push daily  influenza  Vaccine (HIGH DOSE) 0.5 milliLiter(s) IntraMuscular once  lactated ringers. 1000 milliLiter(s) (100 mL/Hr) IV Continuous <Continuous>  metoprolol succinate ER 50 milliGRAM(s) Oral two times a day  polyethylene glycol 3350 17 Gram(s) Oral two times a day  senna 2 Tablet(s) Oral at bedtime    MEDICATIONS  (PRN):  acetaminophen     Tablet .. 650 milliGRAM(s) Oral every 6 hours PRN Moderate Pain (4 - 6)        Vital Signs Last 24 Hrs  T(C): 36.4 (03 Oct 2024 08:26), Max: 37.2 (02 Oct 2024 20:45)  T(F): 97.5 (03 Oct 2024 08:26), Max: 99 (02 Oct 2024 20:45)  HR: 102 (03 Oct 2024 08:26) (64 - 102)  BP: 133/88 (03 Oct 2024 08:26) (128/83 - 146/72)  BP(mean): --  RR: 18 (03 Oct 2024 08:26) (17 - 18)  SpO2: 98% (03 Oct 2024 08:26) (94% - 98%)    Parameters below as of 03 Oct 2024 08:26  Patient On (Oxygen Delivery Method): room air  O2 Flow (L/min): 2      Daily     Daily Weight in k (03 Oct 2024 08:26)    I&O's Detail    02 Oct 2024 07:01  -  03 Oct 2024 07:00  --------------------------------------------------------  IN:  Total IN: 0 mL    OUT:    Voided (mL): 1200 mL  Total OUT: 1200 mL    Total NET: -1200 mL          PHYSICAL EXAM:  Appearance: In NAD  Neck: No JVD,   Cardiovascular: Normal S1 S2  Respiratory: Scattered coarse BS  Gastrointestinal:  Soft, Non-tender, + BS, no bruits	  Neurologic: Grossly non-focal.  Extremities: No edema    LABS:                        11.6   4.28  )-----------( 138      ( 03 Oct 2024 07:17 )             35.4     10-03    139  |  100  |  17.2  ----------------------------<  120[H]  3.7   |  28.0  |  0.81    Ca    8.7      03 Oct 2024 07:17  Phos  3.2     10-03  Mg     1.9     10-03    TPro  5.8[L]  /  Alb  3.7  /  TBili  0.8  /  DBili  x   /  AST  18  /  ALT  18  /  AlkPhos  90  10-03    CARDIAC MARKERS ( 02 Oct 2024 05:40 )  x     / x     / x     / x     / 5.2 ng/mL        Urinalysis Basic - ( 03 Oct 2024 07:17 )    Color: x / Appearance: x / SG: x / pH: x  Gluc: 120 mg/dL / Ketone: x  / Bili: x / Urobili: x   Blood: x / Protein: x / Nitrite: x   Leuk Esterase: x / RBC: x / WBC x   Sq Epi: x / Non Sq Epi: x / Bacteria: x      I&O's Summary    02 Oct 2024 07:01  -  03 Oct 2024 07:00  --------------------------------------------------------  IN: 0 mL / OUT: 1200 mL / NET: -1200 mL

## 2024-10-03 NOTE — PROGRESS NOTE ADULT - ASSESSMENT
89 y.o female with PMHx left breast carcinoma s/p left mastectomy about 20 years ago, Afib on Eliquis as of 6 months ago, HTN, HLD presented to the ED following a fall at her [facility]. CT Chest notable for right middle lobe consolidation.     #Afib with RVR  -Telemetry monitoring   - Cardizem 120mg daily  - Toprol XL 50mg BID  -Eliquis discontinued due to recent fall    - Cardiology following  - Continue IV Lasix 40mg daily    #Mechanical Fall   -CT head negative for fracture and hemorrhage   -PT consult recommending Campos placement    #Rhabdomyolysis   -Initial -704, currently downtrending   -serial monitoring     #Right distal radius fracture   -reduced by ortho 9/29  -acetaminophen 650mg prn for pain    #Community Acquired PNA  -CT Chest showing right middle lobe focal consolidation  -on 2L NC  -Azithromycin 500mg 3 days (End date: 10/1/24)  -Ceftriaxone 1g daily 5 days (End date: 10/3/24)    #HTN  - Cardiology recs appreciated  - Cardizem 120mg daily  - Toprol XL 50mg BID    #HLD   -atorvastatin 20mg bedtime     DVT ppx  - SCD    Dispo: Likely DC tomorrow pending Cardio clearance

## 2024-10-03 NOTE — PROGRESS NOTE ADULT - SUBJECTIVE AND OBJECTIVE BOX
Chief complaint: Afib with RVR    Patient seen and examined at bedside. No acute overnight events reported. No fever, chills, nausea or vomiting.     Vital Signs Last 24 Hrs  T(F): 97.5 (03 Oct 2024 08:26), Max: 99 (02 Oct 2024 20:45)  HR: 102 (03 Oct 2024 08:26) (64 - 102)  BP: 133/88 (03 Oct 2024 08:26) (128/83 - 146/72)  RR: 18 (03 Oct 2024 08:26) (17 - 18)  SpO2: 98% (03 Oct 2024 08:26) (94% - 98%)    Physical Exam:  Constitutional: alert and oriented, in no acute distress   Neck: Soft and supple  Respiratory: Bibasilar crackles  Cardiovascular: Irregular rhythm  Gastrointestinal: Soft, non-tender to palpation, +bs  Vascular: 2+ peripheral pulses  Neurological: A/O x 3  Musculoskeletal: 5/5 strength b/l upper and lower extremities, no lower extremity edema bilaterally    Labs:                        11.6   4.28  )-----------( 138      ( 03 Oct 2024 07:17 )             35.4   10-03    139  |  100  |  17.2  ----------------------------<  120[H]  3.7   |  28.0  |  0.81    Ca    8.7      03 Oct 2024 07:17  Phos  3.2     10-03  Mg     1.9     10-03    TPro  5.8[L]  /  Alb  3.7  /  TBili  0.8  /  DBili  x   /  AST  18  /  ALT  18  /  AlkPhos  90  10-03

## 2024-10-03 NOTE — PROGRESS NOTE ADULT - ASSESSMENT
89 y.o female [facility] resident with  left breast carcinoma s/p left mastectomy about 20 years ago, Afib on Eliquis as of 6 months ago, HTN, HLD presented to the ED following a fall at her [facility], where she fell onto her right side and had been laying on the ground for 4 hours before being found. She had a right distal radius fracture s/p reduction done by ortho service. CT chest in ED demonstrated a right middle lobe consolidation for which patient was placed on 3 day course of azithromycin and 5 day course of ceftriaxone.  She was found to have ns A-Fib/ Flutter with HR with rapid rates treated with diltiazem but held for transiennt bradycardia.  She is admitted 09/29/2024 with   1. Mechanical fall with prolonged stasis on ground (09/29/2024)   2. Right distal radius fracture s/p reduction by ortho 09/29/2024   3. Atrial fibrillation, now rate controlled, poor anticoagulation candidate secondary to multiple falls.   4. Chronic HFpEF  5. Community Acquired PNA     PLAN:  A-Fib anticoagulation therapy is now contraindicated as Pt is S/p multiple recent falls  Continue Diltiazem CD to 120 mg PO daily  Continue Toprol XL to 50 mg BID  Lasix 40 mg IVP daily  K-Karlene 40 meq PO x 1   Patient's primary cardiologist is Dr. Jean

## 2024-10-04 ENCOUNTER — RESULT REVIEW (OUTPATIENT)
Age: 89
End: 2024-10-04

## 2024-10-04 LAB
ALBUMIN SERPL ELPH-MCNC: 3.5 G/DL — SIGNIFICANT CHANGE UP (ref 3.3–5.2)
ALP SERPL-CCNC: 83 U/L — SIGNIFICANT CHANGE UP (ref 40–120)
ALT FLD-CCNC: 17 U/L — SIGNIFICANT CHANGE UP
ANION GAP SERPL CALC-SCNC: 13 MMOL/L — SIGNIFICANT CHANGE UP (ref 5–17)
APPEARANCE UR: CLEAR — SIGNIFICANT CHANGE UP
AST SERPL-CCNC: 14 U/L — SIGNIFICANT CHANGE UP
BASOPHILS # BLD AUTO: 0.05 K/UL — SIGNIFICANT CHANGE UP (ref 0–0.2)
BASOPHILS NFR BLD AUTO: 0.9 % — SIGNIFICANT CHANGE UP (ref 0–2)
BILIRUB SERPL-MCNC: 0.6 MG/DL — SIGNIFICANT CHANGE UP (ref 0.4–2)
BILIRUB UR-MCNC: NEGATIVE — SIGNIFICANT CHANGE UP
BUN SERPL-MCNC: 21.8 MG/DL — HIGH (ref 8–20)
CALCIUM SERPL-MCNC: 8.4 MG/DL — SIGNIFICANT CHANGE UP (ref 8.4–10.5)
CHLORIDE SERPL-SCNC: 103 MMOL/L — SIGNIFICANT CHANGE UP (ref 96–108)
CO2 SERPL-SCNC: 25 MMOL/L — SIGNIFICANT CHANGE UP (ref 22–29)
COLOR SPEC: YELLOW — SIGNIFICANT CHANGE UP
CREAT SERPL-MCNC: 0.76 MG/DL — SIGNIFICANT CHANGE UP (ref 0.5–1.3)
DIFF PNL FLD: NEGATIVE — SIGNIFICANT CHANGE UP
EGFR: 73 ML/MIN/1.73M2 — SIGNIFICANT CHANGE UP
EOSINOPHIL # BLD AUTO: 0.03 K/UL — SIGNIFICANT CHANGE UP (ref 0–0.5)
EOSINOPHIL NFR BLD AUTO: 0.5 % — SIGNIFICANT CHANGE UP (ref 0–6)
GLUCOSE SERPL-MCNC: 117 MG/DL — HIGH (ref 70–99)
GLUCOSE UR QL: NEGATIVE MG/DL — SIGNIFICANT CHANGE UP
HCT VFR BLD CALC: 34.6 % — SIGNIFICANT CHANGE UP (ref 34.5–45)
HGB BLD-MCNC: 11.6 G/DL — SIGNIFICANT CHANGE UP (ref 11.5–15.5)
IMM GRANULOCYTES NFR BLD AUTO: 0.5 % — SIGNIFICANT CHANGE UP (ref 0–0.9)
KETONES UR-MCNC: NEGATIVE MG/DL — SIGNIFICANT CHANGE UP
LEUKOCYTE ESTERASE UR-ACNC: NEGATIVE — SIGNIFICANT CHANGE UP
LYMPHOCYTES # BLD AUTO: 1.15 K/UL — SIGNIFICANT CHANGE UP (ref 1–3.3)
LYMPHOCYTES # BLD AUTO: 20.9 % — SIGNIFICANT CHANGE UP (ref 13–44)
MAGNESIUM SERPL-MCNC: 1.9 MG/DL — SIGNIFICANT CHANGE UP (ref 1.6–2.6)
MCHC RBC-ENTMCNC: 28.6 PG — SIGNIFICANT CHANGE UP (ref 27–34)
MCHC RBC-ENTMCNC: 33.5 GM/DL — SIGNIFICANT CHANGE UP (ref 32–36)
MCV RBC AUTO: 85.2 FL — SIGNIFICANT CHANGE UP (ref 80–100)
MONOCYTES # BLD AUTO: 0.98 K/UL — HIGH (ref 0–0.9)
MONOCYTES NFR BLD AUTO: 17.8 % — HIGH (ref 2–14)
NEUTROPHILS # BLD AUTO: 3.27 K/UL — SIGNIFICANT CHANGE UP (ref 1.8–7.4)
NEUTROPHILS NFR BLD AUTO: 59.4 % — SIGNIFICANT CHANGE UP (ref 43–77)
NITRITE UR-MCNC: NEGATIVE — SIGNIFICANT CHANGE UP
PH UR: 7.5 — SIGNIFICANT CHANGE UP (ref 5–8)
PHOSPHATE SERPL-MCNC: 3.7 MG/DL — SIGNIFICANT CHANGE UP (ref 2.4–4.7)
PLATELET # BLD AUTO: 138 K/UL — LOW (ref 150–400)
POTASSIUM SERPL-MCNC: 3.6 MMOL/L — SIGNIFICANT CHANGE UP (ref 3.5–5.3)
POTASSIUM SERPL-SCNC: 3.6 MMOL/L — SIGNIFICANT CHANGE UP (ref 3.5–5.3)
PROT SERPL-MCNC: 5.3 G/DL — LOW (ref 6.6–8.7)
PROT UR-MCNC: NEGATIVE MG/DL — SIGNIFICANT CHANGE UP
RAPID RVP RESULT: SIGNIFICANT CHANGE UP
RBC # BLD: 4.06 M/UL — SIGNIFICANT CHANGE UP (ref 3.8–5.2)
RBC # FLD: 14.5 % — SIGNIFICANT CHANGE UP (ref 10.3–14.5)
SARS-COV-2 RNA SPEC QL NAA+PROBE: SIGNIFICANT CHANGE UP
SODIUM SERPL-SCNC: 141 MMOL/L — SIGNIFICANT CHANGE UP (ref 135–145)
SP GR SPEC: 1.01 — SIGNIFICANT CHANGE UP (ref 1–1.03)
UROBILINOGEN FLD QL: 1 MG/DL — SIGNIFICANT CHANGE UP (ref 0.2–1)
WBC # BLD: 5.51 K/UL — SIGNIFICANT CHANGE UP (ref 3.8–10.5)
WBC # FLD AUTO: 5.51 K/UL — SIGNIFICANT CHANGE UP (ref 3.8–10.5)

## 2024-10-04 PROCEDURE — 93970 EXTREMITY STUDY: CPT | Mod: 26

## 2024-10-04 PROCEDURE — 71275 CT ANGIOGRAPHY CHEST: CPT | Mod: 26

## 2024-10-04 PROCEDURE — 93306 TTE W/DOPPLER COMPLETE: CPT | Mod: 26

## 2024-10-04 PROCEDURE — 99232 SBSQ HOSP IP/OBS MODERATE 35: CPT

## 2024-10-04 RX ORDER — METOPROLOL TARTRATE 50 MG
5 TABLET ORAL ONCE
Refills: 0 | Status: COMPLETED | OUTPATIENT
Start: 2024-10-04 | End: 2024-10-04

## 2024-10-04 RX ORDER — DILTIAZEM HCL 300 MG
60 CAPSULE, EXTENDED RELEASE 24HR ORAL
Refills: 0 | Status: DISCONTINUED | OUTPATIENT
Start: 2024-10-04 | End: 2024-10-05

## 2024-10-04 RX ORDER — ENOXAPARIN SODIUM 150 MG/ML
40 INJECTION SUBCUTANEOUS EVERY 24 HOURS
Refills: 0 | Status: DISCONTINUED | OUTPATIENT
Start: 2024-10-04 | End: 2024-10-08

## 2024-10-04 RX ADMIN — Medication 17 GRAM(S): at 05:59

## 2024-10-04 RX ADMIN — Medication 120 MILLIGRAM(S): at 05:58

## 2024-10-04 RX ADMIN — Medication 17 GRAM(S): at 17:43

## 2024-10-04 RX ADMIN — Medication 50 MILLIGRAM(S): at 05:58

## 2024-10-04 RX ADMIN — Medication 650 MILLIGRAM(S): at 21:49

## 2024-10-04 RX ADMIN — Medication 60 MILLIGRAM(S): at 23:06

## 2024-10-04 RX ADMIN — Medication 50 MILLIGRAM(S): at 17:42

## 2024-10-04 RX ADMIN — Medication 5 MILLIGRAM(S): at 04:24

## 2024-10-04 RX ADMIN — Medication 60 MILLIGRAM(S): at 17:42

## 2024-10-04 RX ADMIN — Medication 40 MILLIEQUIVALENT(S): at 14:09

## 2024-10-04 RX ADMIN — ENOXAPARIN SODIUM 40 MILLIGRAM(S): 150 INJECTION SUBCUTANEOUS at 14:09

## 2024-10-04 RX ADMIN — Medication 650 MILLIGRAM(S): at 22:49

## 2024-10-04 RX ADMIN — Medication 1000 MICROGRAM(S): at 14:09

## 2024-10-04 RX ADMIN — ATORVASTATIN CALCIUM 20 MILLIGRAM(S): 10 TABLET, FILM COATED ORAL at 21:07

## 2024-10-04 RX ADMIN — Medication 2 TABLET(S): at 21:07

## 2024-10-04 RX ADMIN — FUROSEMIDE 40 MILLIGRAM(S): 10 INJECTION INTRAVENOUS at 05:58

## 2024-10-04 NOTE — PROGRESS NOTE ADULT - SUBJECTIVE AND OBJECTIVE BOX
HPI  89 y.o female with PMHx left breast carcinoma s/p left mastectomy about 20 years ago, Afib on Eliquis as of 6 months ago, HTN, HLD presented to the ED following a fall at her [facility], where she fell onto her right side and had been laying on the ground for 4 hours before being found. The patient has been living at the nursing home for the past 2 years. She denies loss of consciousness and ED trauma scan was notable for a right distal radius fracture s/p reduction done by ortho service. CT chest in ED demonstrated a right middle lobe consolidation for which patient was placed on 3 day course of azithromycin and 5 day course of ceftriaxone.     INTERVAL EVENTS  Patient was in sustained Afib with RVR to 130s. Primary team pursued PE workup with CTA showing negative PE but bilateral pleural effusionS. LE dopplers negative for DVT.     HOSPITAL COURSE  Patient presented to the ED on 9/29 following a fall with labs concerning for rhabdomyolysis. Trauma scan was positive for fractured distal radius that was reduced by Ortho on 9/29. CT Chest demonstrated right middle lobe consolidation for which patient was placed on antibiotics. Her telemetry monitoring alarmed on 9/29 following an episode of hypotension and bradycardia with Afib converted to Aflutter responded to with IVF. On 10/2, Pt was seen by cardiology following her telemetry alarm on 9/29, recommending discontinue of Eliquis following her fall in addition to furosemide 20mg PO every other day. In the morning, pt was tachycardic with rates in the 130s. Patient received her scheduled Cardizem and metoprolol.       REVIEW OF SYSTEMS   General: No fatigue, decreased apatite, weight loss  HEENT: No cough, throat pain, rhinorrhea hemoptysis    Chest: No shortness of breath, chest pain  Abdomen: No abdominal pain, hematemesis   Genitourinary: No dysuria, No hematuria   Extremities: No joint pain, no change in range of motion  Skin: No rashes, ecchymoses purpura, nodules       MEDICATIONS  MEDICATIONS  (STANDING):  atorvastatin 20 milliGRAM(s) Oral at bedtime  cyanocobalamin 1000 MICROGram(s) Oral daily  diltiazem    milliGRAM(s) Oral daily  enoxaparin Injectable 40 milliGRAM(s) SubCutaneous every 24 hours  furosemide   Injectable 40 milliGRAM(s) IV Push daily  influenza  Vaccine (HIGH DOSE) 0.5 milliLiter(s) IntraMuscular once  lactated ringers. 1000 milliLiter(s) (100 mL/Hr) IV Continuous <Continuous>  metoprolol succinate ER 50 milliGRAM(s) Oral two times a day  polyethylene glycol 3350 17 Gram(s) Oral two times a day  senna 2 Tablet(s) Oral at bedtime    MEDICATIONS  (PRN):  acetaminophen     Tablet .. 650 milliGRAM(s) Oral every 6 hours PRN Moderate Pain (4 - 6)      ALLERGIES  Allergies    No Known Allergies    Intolerances        PHYSICAL EXAM   Vital Signs Last 24 Hrs  T(C): 36.5 (04 Oct 2024 08:40), Max: 36.7 (03 Oct 2024 20:00)  T(F): 97.7 (04 Oct 2024 08:40), Max: 98 (03 Oct 2024 20:00)  HR: 132 (04 Oct 2024 08:40) (91 - 138)  BP: 134/94 (04 Oct 2024 08:40) (111/80 - 138/87)  BP(mean): --  RR: 19 (04 Oct 2024 08:40) (18 - 19)  SpO2: 98% (04 Oct 2024 08:40) (96% - 98%)    Parameters below as of 04 Oct 2024 08:40  Patient On (Oxygen Delivery Method): nasal cannula  O2 Flow (L/min): 2      T(C): 36.5 (10-04-24 @ 08:40), Max: 36.7 (10-03-24 @ 20:00)  HR: 132 (10-04-24 @ 08:40) (91 - 138)  BP: 134/94 (10-04-24 @ 08:40) (111/80 - 138/87)  RR: 19 (10-04-24 @ 08:40) (18 - 19)  SpO2: 98% (10-04-24 @ 08:40) (96% - 98%)    CONSTITUTIONAL: Well groomed, no apparent distress  EYES: PERRLA and symmetric, EOMI, No conjunctival or scleral injection, non-icteric  ENMT: Oral mucosa with moist membranes. Normal dentition; no pharyngeal injection or exudates             NECK: Supple, symmetric and without tracheal deviation   RESP: No respiratory distress, no use of accessory muscles; CTA b/l, no WRR  CV: RRR, +S1S2, no MRG; no JVD; no peripheral edema  GI: Soft, NT, ND, no rebound, no guarding; no palpable masses; no hepatosplenomegaly; no hernia palpated  LYMPH: No cervical LAD or tenderness; no axillary LAD or tenderness; no inguinal LAD or tenderness  MSK: Normal gait; No digital clubbing or cyanosis; examination of the (head/neck/spine/ribs/pelvis, RUE, LUE, RLE, LLE) without misalignment,            Normal ROM without pain, no spinal tenderness, normal muscle strength/tone  SKIN: No rashes or ulcers noted; no subcutaneous nodules or induration palpable  NEURO: CN II-XII intact; normal reflexes in upper and lower extremities, sensation intact in upper and lower extremities b/l to light touch   PSYCH: Appropriate insight/judgment; A+O x 3, mood and affect appropriate, recent/remote memory intact      LABS                        11.6   5.51  )-----------( 138      ( 04 Oct 2024 04:30 )             34.6       CBC Full  -  ( 04 Oct 2024 04:30 )  WBC Count : 5.51 K/uL  RBC Count : 4.06 M/uL  Hemoglobin : 11.6 g/dL  Hematocrit : 34.6 %  Platelet Count - Automated : 138 K/uL  Mean Cell Volume : 85.2 fl  Mean Cell Hemoglobin : 28.6 pg  Mean Cell Hemoglobin Concentration : 33.5 gm/dL  Auto Neutrophil # : 3.27 K/uL  Auto Lymphocyte # : 1.15 K/uL  Auto Monocyte # : 0.98 K/uL  Auto Eosinophil # : 0.03 K/uL  Auto Basophil # : 0.05 K/uL  Auto Neutrophil % : 59.4 %  Auto Lymphocyte % : 20.9 %  Auto Monocyte % : 17.8 %  Auto Eosinophil % : 0.5 %  Auto Basophil % : 0.9 %      10-04    141  |  103  |  21.8[H]  ----------------------------<  117[H]  3.6   |  25.0  |  0.76    Ca    8.4      04 Oct 2024 04:30  Phos  3.7     10-04  Mg     1.9     10-04    TPro  5.3[L]  /  Alb  3.5  /  TBili  0.6  /  DBili  x   /  AST  14  /  ALT  17  /  AlkPhos  83  10-04      CAPILLARY BLOOD GLUCOSE              Urinalysis Basic - ( 04 Oct 2024 04:30 )    Color: x / Appearance: x / SG: x / pH: x  Gluc: 117 mg/dL / Ketone: x  / Bili: x / Urobili: x   Blood: x / Protein: x / Nitrite: x   Leuk Esterase: x / RBC: x / WBC x   Sq Epi: x / Non Sq Epi: x / Bacteria: x        IMAGING          HPI  89 y.o female with PMHx left breast carcinoma s/p left mastectomy about 20 years ago, Afib on Eliquis as of 6 months ago, HTN, HLD presented to the ED following a fall at her [facility], where she fell onto her right side and had been laying on the ground for 4 hours before being found. The patient has been living at the nursing home for the past 2 years. She denies loss of consciousness and ED trauma scan was notable for a right distal radius fracture s/p reduction done by ortho service. CT chest in ED demonstrated a right middle lobe consolidation for which patient was placed on 3 day course of azithromycin and 5 day course of ceftriaxone.     INTERVAL EVENTS  Patient was in sustained Afib with RVR to 130s. Primary team pursued PE workup with CTA showing negative PE but bilateral pleural effusions. LE dopplers negative for DVT.     HOSPITAL COURSE  Patient presented to the ED on 9/29 following a fall with labs concerning for rhabdomyolysis. Trauma scan was positive for fractured distal radius that was reduced by Ortho on 9/29. CT Chest demonstrated right middle lobe consolidation for which patient was placed on antibiotics. Her telemetry monitoring alarmed on 9/29 following an episode of hypotension and bradycardia with Afib converted to Aflutter responded to with IVF. On 10/2, Pt was seen by cardiology following her telemetry alarm on 9/29, recommending discontinue of Eliquis following her fall in addition to furosemide 20mg PO every other day. In the morning, pt was tachycardic with rates in the 130s. Patient received her scheduled Cardizem and metoprolol.       REVIEW OF SYSTEMS  Patient endorsing difficulty breathing and improved right arm pain. Denies chest pain or palpitations. Reports has not had a bowel movement since admission, on mutli bowel regimen.   General: No fatigue, decreased apatite, weight loss  HEENT: No cough, throat pain, rhinorrhea hemoptysis    Chest: shortness of breath present, no chest pain  Abdomen: No abdominal pain, hematemesis   Genitourinary: No dysuria, No hematuria   Extremities: No joint pain, no change in range of motion  Skin: No rashes, ecchymoses purpura, nodules       MEDICATIONS  MEDICATIONS  (STANDING):  atorvastatin 20 milliGRAM(s) Oral at bedtime  cyanocobalamin 1000 MICROGram(s) Oral daily  diltiazem    milliGRAM(s) Oral daily  enoxaparin Injectable 40 milliGRAM(s) SubCutaneous every 24 hours  furosemide   Injectable 40 milliGRAM(s) IV Push daily  influenza  Vaccine (HIGH DOSE) 0.5 milliLiter(s) IntraMuscular once  lactated ringers. 1000 milliLiter(s) (100 mL/Hr) IV Continuous <Continuous>  metoprolol succinate ER 50 milliGRAM(s) Oral two times a day  polyethylene glycol 3350 17 Gram(s) Oral two times a day  senna 2 Tablet(s) Oral at bedtime    MEDICATIONS  (PRN):  acetaminophen     Tablet .. 650 milliGRAM(s) Oral every 6 hours PRN Moderate Pain (4 - 6)      ALLERGIES  Allergies    No Known Allergies    Intolerances        PHYSICAL EXAM   Vital Signs Last 24 Hrs  T(C): 36.5 (04 Oct 2024 08:40), Max: 36.7 (03 Oct 2024 20:00)  T(F): 97.7 (04 Oct 2024 08:40), Max: 98 (03 Oct 2024 20:00)  HR: 132 (04 Oct 2024 08:40) (91 - 138)  BP: 134/94 (04 Oct 2024 08:40) (111/80 - 138/87)  BP(mean): --  RR: 19 (04 Oct 2024 08:40) (18 - 19)  SpO2: 98% (04 Oct 2024 08:40) (96% - 98%)    Parameters below as of 04 Oct 2024 08:40  Patient On (Oxygen Delivery Method): nasal cannula  O2 Flow (L/min): 2      T(C): 36.5 (10-04-24 @ 08:40), Max: 36.7 (10-03-24 @ 20:00)  HR: 132 (10-04-24 @ 08:40) (91 - 138)  BP: 134/94 (10-04-24 @ 08:40) (111/80 - 138/87)  RR: 19 (10-04-24 @ 08:40) (18 - 19)  SpO2: 98% (10-04-24 @ 08:40) (96% - 98%)    CONSTITUTIONAL: Well groomed, no apparent distress  EYES: PERRLA and symmetric, EOMI, No conjunctival or scleral injection, non-icteric  ENMT: Oral mucosa with moist membranes. Normal dentition  NECK:  without tracheal deviation   RESP: No respiratory distress, no use of accessory muscles; CTA b/l, no WRR  CV: RRR, +S1S2, no MRG; no JVD; no peripheral edema.  GI: Soft, NT, ND, no rebound, no guarding; no palpable masses; no hepatosplenomegaly; no hernia palpated  LYMPH: No cervical LAD or tenderness; no axillary LAD or tenderness; no inguinal LAD or tenderness  MSK:  LLE with reduced muscle mass compared to RLE. right arm in ACE wraps and cast.    SKIN: No rashes or ulcers noted; no subcutaneous nodules or induration palpable  NEURO: sensation intact in upper and lower extremities b/l to light touch   PSYCH: Appropriate insight/judgment; A+O x 4, mood and affect appropriate, recent/remote memory intact      LABS                        11.6   5.51  )-----------( 138      ( 04 Oct 2024 04:30 )             34.6       CBC Full  -  ( 04 Oct 2024 04:30 )  WBC Count : 5.51 K/uL  RBC Count : 4.06 M/uL  Hemoglobin : 11.6 g/dL  Hematocrit : 34.6 %  Platelet Count - Automated : 138 K/uL  Mean Cell Volume : 85.2 fl  Mean Cell Hemoglobin : 28.6 pg  Mean Cell Hemoglobin Concentration : 33.5 gm/dL  Auto Neutrophil # : 3.27 K/uL  Auto Lymphocyte # : 1.15 K/uL  Auto Monocyte # : 0.98 K/uL  Auto Eosinophil # : 0.03 K/uL  Auto Basophil # : 0.05 K/uL  Auto Neutrophil % : 59.4 %  Auto Lymphocyte % : 20.9 %  Auto Monocyte % : 17.8 %  Auto Eosinophil % : 0.5 %  Auto Basophil % : 0.9 %      10-04    141  |  103  |  21.8[H]  ----------------------------<  117[H]  3.6   |  25.0  |  0.76    Ca    8.4      04 Oct 2024 04:30  Phos  3.7     10-04  Mg     1.9     10-04    TPro  5.3[L]  /  Alb  3.5  /  TBili  0.6  /  DBili  x   /  AST  14  /  ALT  17  /  AlkPhos  83  10-04      CAPILLARY BLOOD GLUCOSE              Urinalysis Basic - ( 04 Oct 2024 04:30 )    Color: x / Appearance: x / SG: x / pH: x  Gluc: 117 mg/dL / Ketone: x  / Bili: x / Urobili: x   Blood: x / Protein: x / Nitrite: x   Leuk Esterase: x / RBC: x / WBC x   Sq Epi: x / Non Sq Epi: x / Bacteria: x        IMAGING   < from: US Duplex Venous Lower Ext Complete, Bilateral (10.04.24 @ 11:17) >  ACC: [id number] EXAM:  US DPLX LWR EXT VEINS COMPL BI PROCEDURE DATE:  10/04/2024          INTERPRETATION:  CLINICAL INFORMATION: Rule out DVT    COMPARISON: None available.    TECHNIQUE: Duplex sonography of the BILATERAL LOWER extremity veins with   color and spectral Doppler, with and without compression.    FINDINGS:    RIGHT:  Normal compressibility of the RIGHT common femoral, femoral and popliteal   veins.  Doppler examination shows normal spontaneous and phasic flow.  No RIGHT calf vein thrombosis is detected.    LEFT:  Normal compressibility of the LEFT common femoral, femoral and popliteal   veins.  Doppler examination shows normal spontaneous and phasic flow.  No LEFT calf vein thrombosis is detected.  Left popliteal fossa fluid collection measuring 2.5 x 0.8 x 2.1 cm.    IMPRESSION:  No evidence of deep venous thrombosis in either lower extremity.    There is a 2.5 cm left popliteal fossa cyst, likely a Baker's cyst.        --- End of Report ---      < end of copied text >  < from: CT Angio Chest PE Protocol w/ IV Cont (10.04.24 @ 10:42) >  ACC: [id number] EXAM:  CT ANGIO CHEST PULM ART WAWIC  PROCEDURE DATE:  10/04/2024          INTERPRETATION:  CLINICAL INDICATION: Abdomen mild cyst, rule out   pulmonary embolism    TECHNIQUE: A volumetric acquisition of the chest was obtained from the   thoracic inlet to the upper abdomen during dynamic administration of 70cc   intravenous contrast according to the PE protocol. 3D MIP images were   provided.    COMPARISON: Chest CT 9/29/2024    FINDINGS:  CTA: No pulmonary embolism. Cardiomegaly. No pericardial effusion.   Coronary arterial and mitral annular calcification. Enlarged pulmonary   artery which can be seen with pulmonary hypertension. 3.8 cm mid   ascending aorta. Ectatic arch and descending thoracic aorta measuring up   to 3.8 cm in the proximal descending segment.    Lungs/Airways/Pleura: Increased small pleural effusions with worsening   complete right and partial left lower lobe atelectasis. No pneumothorax.   Previously seen right middle lobe opacity has resolved. Mild apical   emphysema. Mild septal thickening likely due to interstitial edema. The   central airways are patent with mucous secretions in the lower trachea.    Mediastinum/Lymph nodes: No thoracic adenopathy.    Upper Abdomen: Bilateral renal cysts. Enlarging indeterminate 3.7 x 3.5   cm right adrenal mass, previously measuring 2.8 x 2.7 cm in May 2023.    Bones and Soft Tissues: Left mastectomy and left axillary mary alice   dissection. Indeterminate right breast mass (5:129), better seen on   recent prior chest CT. Old bilateral rib fractures. Scoliosis. No   aggressive osseous lesion.    IMPRESSION:  No pulmonary embolism.    Increasing small pleural effusions with worsening lower lobe atelectasis   compared to 9/29/2024 chest CT.    Interval resolution of previously seen right middle lobe opacity.    Indeterminate right breast mass, better seen on the recent prior chest   CT. left mastectomy and axillary mary alice dissection.    Enlarging indeterminate 3.7 x 3.5 cm right adrenal mass, previously   measuring 2.8 x 2.7 cm in May 2023.    --- End of Report ---        < end of copied text >  < from: Xray Chest 1 View AP/PA. (10.03.24 @ 10:29) >  ACC: [id number] EXAM:  XR ABDOMEN PORTABLE ROUTINE 1V   ORDERED BY: MAGNOLIA SETHI     ACC: [id number] EXAM:  XR CHEST AP OR PA 1V       PROCEDURE DATE:  10/03/2024          INTERPRETATION:  TECHNIQUE: Single portable view of the chest.    COMPARISON:  11/29/2023    CLINICAL HISTORY: Afib    FINDINGS:    Single frontal view of the chest demonstrates small bilateral   effusions/atelectasis. The cardiomediastinal silhouette is enlarged. No   acute osseous abnormalities. Overlying EKG leads and wires are noted      =====================    TECHNIQUE: Single portable view(s) of the abdomen.    COMPARISON: CT abdomen pelvis dated 9/29/2024    CLINICAL indications:Afib    FINDINGS:    Moderate amount of fecal material. Noevidence of ileus or obstruction.   No air-fluid levels. No acute osseous abnormality. CT is more sensitive.    =====================      IMPRESSION: Small bilateral effusions/atelectasis. Nonobstructive bowel   gas pattern.    --- End of Report ---      < end of copied text >

## 2024-10-04 NOTE — PROGRESS NOTE ADULT - ASSESSMENT
89 y.o female with PMHx left breast carcinoma s/p left mastectomy about 20 years ago, Afib on Eliquis as of 6 months ago, HTN, HLD presented to the ED following a fall at her [facility]. CT Chest notable for right middle lobe consolidation on ED presentation.       #Mechanical Fall   -CT head negative for fracture and hemorrhage   -PT consult recommending Campos placement      #Rhabdomyolysis   -Initial -704, currently downtrending   -IVF   -serial monitoring     #Right distal radius fracture   -reduced by ortho 9/29  -acetaminophen 650mg prn for pain    #Community Acquired PNA  -CT Chest showing right middle lobe focal consolidation  -on 2L NC  -Azithromycin 500mg 3 days (End date: 10/1/24)  -Ceftriaxone 1g daily 5 days (End date: 10/3/24)      #Afib  -Telemetry monitoring   -Metoprolol succinate 25mg BID, increased to 50mg BID per Cards recommendations  -Diltiazem 240 mg daily, decreased to 120mg daily per Cards recs   -Eliquis discontinued due to recent fall    -PE work up including UA, RVP, LE Dopplers, CTA  -CTA negative for PE on 10/4, positive for bilateral pleural effusions  -LE Dopplers negative for thrombi bilaterally on 10/4      #HTN  -diltiazem 240 mg daily, per cardiology recs   -Dosing of Cardizem decreased to 120 mg daily     #HLD   -atorvastatin 20mg bedtime     DVT ppx: SCDs  Dispo: D/c to [facility] once medically stable

## 2024-10-04 NOTE — PROGRESS NOTE ADULT - SUBJECTIVE AND OBJECTIVE BOX
INTERVAL HISTORY:  Sitting up in bed awake and alert eating  Denies chest pain and SOB  	  MEDICATIONS:  diltiazem    milliGRAM(s) Oral daily  furosemide   Injectable 40 milliGRAM(s) IV Push daily  metoprolol succinate ER 50 milliGRAM(s) Oral two times a day  acetaminophen     Tablet .. 650 milliGRAM(s) Oral every 6 hours PRN  polyethylene glycol 3350 17 Gram(s) Oral two times a day  senna 2 Tablet(s) Oral at bedtime  atorvastatin 20 milliGRAM(s) Oral at bedtime  cyanocobalamin 1000 MICROGram(s) Oral daily  enoxaparin Injectable 40 milliGRAM(s) SubCutaneous every 24 hours  influenza  Vaccine (HIGH DOSE) 0.5 milliLiter(s) IntraMuscular once  lactated ringers. 1000 milliLiter(s) IV Continuous <Continuous>        PHYSICAL EXAM:    T(C): 36.5 (10-04-24 @ 08:40), Max: 36.7 (10-03-24 @ 20:00)  HR: 132 (10-04-24 @ 08:40) (91 - 138)  BP: 134/94 (10-04-24 @ 08:40) (111/80 - 138/87)  RR: 19 (10-04-24 @ 08:40) (18 - 19)  SpO2: 98% (10-04-24 @ 08:40) (96% - 98%)  Wt(kg): --    I&O's Summary    03 Oct 2024 07:01  -  04 Oct 2024 07:00  --------------------------------------------------------  IN: 0 mL / OUT: 500 mL / NET: -500 mL        Daily     Daily Weight in k.2 (04 Oct 2024 04:40)    Appearance: Normal	  HEENT:   Normal oral mucosa, PERRL, EOMI	  Cardiovascular: Normal S1 S2, No JVD, 1/6 systolic murmurs, No edema  Respiratory: Lungs with mild bibasilar rales	  Psychiatry: A & O x 3, Mood & affect appropriate  Gastrointestinal:  Soft, Non-tender, + BS	  Skin: No rashes, No ecchymoses, No cyanosis  Neurologic: Non-focal  Extremities: Normal range of motion, Right distal radius reduced and in ACE wrap + splint, No clubbing or cyanosis   Vascular: Peripheral pulses bilaterally      TELEMETRY: A-Fib, HR 's	    	    LABS:	 	    CARDIAC MARKERS:                         11.6   5.51  )-----------( 138      ( 04 Oct 2024 04:30 )             34.6     10-04    141  |  103  |  21.8[H]  ----------------------------<  117[H]  3.6   |  25.0  |  0.76    Ca    8.4      04 Oct 2024 04:30  Phos  3.7     10-04  Mg     1.9     10-04    TPro  5.3[L]  /  Alb  3.5  /  TBili  0.6  /  DBili  x   /  AST  14  /  ALT  17  /  AlkPhos  83  10-04    proBNP: 5090    ASSESSMENT/PLAN: 	  89 y.o female with PMHx left breast carcinoma s/p left mastectomy about 20 years ago, Afib on Eliquis as of 6 months ago, HTN, HLD presented to the ED following a fall at her [facility], where she fell onto her right side and had been laying on the ground for 4 hours before being found. The patient has been living at the nursing home for the past 2 years. She denies loss of consciousness and ED trauma scan was notable for a right distal radius fracture s/p reduction done by ortho service. CT chest in ED demonstrated a right middle lobe consolidation for which patient was placed on 3 day course of azithromycin and 5 day course of ceftriaxone.    - Denies chest pain and SOB, O2 Sat's stable on O2 N/C  - Remains A-Fib/ Flutter with HR 's will change Cardizem CD to Cardizem 60 mg Q6 hrs with parameters, continue Toprol XL 50 mg BID  - CTA chest 10/4 shows No PE, No pericardial effusion, Increased small B/L pleural effusions with worsening complete Right and partial Left lower lobe atelectasis.  - BNP elevated 5090, lung sounds with bibasilar rales, Check echo, continue Lasix 40 mg IVP daily,  and supplement K+ , repeat CXR in AM      PLAN:  S/p Mechanical fall/ A-Fib  A-Fib anticoagulation therapy is now contraindicated as Pt is S/p multiple recent falls  Check echo  Change Diltiazem CD to Cardizem 60 mg QID  Continue Toprol XL 50 mg BID  Continue Lasix 40 mg IVP daily  K-Karlene 40 meq PO x 2 doses  Monitor renal function  BMP, CXR in AM

## 2024-10-05 LAB
ALBUMIN SERPL ELPH-MCNC: 3.9 G/DL — SIGNIFICANT CHANGE UP (ref 3.3–5.2)
ALP SERPL-CCNC: 96 U/L — SIGNIFICANT CHANGE UP (ref 40–120)
ALT FLD-CCNC: 20 U/L — SIGNIFICANT CHANGE UP
ANION GAP SERPL CALC-SCNC: 13 MMOL/L — SIGNIFICANT CHANGE UP (ref 5–17)
APTT BLD: 34.4 SEC — SIGNIFICANT CHANGE UP (ref 24.5–35.6)
AST SERPL-CCNC: 18 U/L — SIGNIFICANT CHANGE UP
BILIRUB SERPL-MCNC: 0.9 MG/DL — SIGNIFICANT CHANGE UP (ref 0.4–2)
BUN SERPL-MCNC: 26.3 MG/DL — HIGH (ref 8–20)
CALCIUM SERPL-MCNC: 9.4 MG/DL — SIGNIFICANT CHANGE UP (ref 8.4–10.5)
CHLORIDE SERPL-SCNC: 101 MMOL/L — SIGNIFICANT CHANGE UP (ref 96–108)
CO2 SERPL-SCNC: 27 MMOL/L — SIGNIFICANT CHANGE UP (ref 22–29)
CREAT SERPL-MCNC: 0.73 MG/DL — SIGNIFICANT CHANGE UP (ref 0.5–1.3)
EGFR: 77 ML/MIN/1.73M2 — SIGNIFICANT CHANGE UP
GLUCOSE SERPL-MCNC: 106 MG/DL — HIGH (ref 70–99)
HCT VFR BLD CALC: 38.2 % — SIGNIFICANT CHANGE UP (ref 34.5–45)
HGB BLD-MCNC: 12.9 G/DL — SIGNIFICANT CHANGE UP (ref 11.5–15.5)
MAGNESIUM SERPL-MCNC: 2 MG/DL — SIGNIFICANT CHANGE UP (ref 1.6–2.6)
MCHC RBC-ENTMCNC: 28.4 PG — SIGNIFICANT CHANGE UP (ref 27–34)
MCHC RBC-ENTMCNC: 33.8 GM/DL — SIGNIFICANT CHANGE UP (ref 32–36)
MCV RBC AUTO: 84.1 FL — SIGNIFICANT CHANGE UP (ref 80–100)
NT-PROBNP SERPL-SCNC: 2717 PG/ML — HIGH (ref 0–300)
PHOSPHATE SERPL-MCNC: 3.7 MG/DL — SIGNIFICANT CHANGE UP (ref 2.4–4.7)
PLATELET # BLD AUTO: 157 K/UL — SIGNIFICANT CHANGE UP (ref 150–400)
POTASSIUM SERPL-MCNC: 4.1 MMOL/L — SIGNIFICANT CHANGE UP (ref 3.5–5.3)
POTASSIUM SERPL-SCNC: 4.1 MMOL/L — SIGNIFICANT CHANGE UP (ref 3.5–5.3)
PROT SERPL-MCNC: 6.2 G/DL — LOW (ref 6.6–8.7)
RBC # BLD: 4.54 M/UL — SIGNIFICANT CHANGE UP (ref 3.8–5.2)
RBC # FLD: 14.5 % — SIGNIFICANT CHANGE UP (ref 10.3–14.5)
SODIUM SERPL-SCNC: 140 MMOL/L — SIGNIFICANT CHANGE UP (ref 135–145)
WBC # BLD: 5.39 K/UL — SIGNIFICANT CHANGE UP (ref 3.8–10.5)
WBC # FLD AUTO: 5.39 K/UL — SIGNIFICANT CHANGE UP (ref 3.8–10.5)

## 2024-10-05 PROCEDURE — 71045 X-RAY EXAM CHEST 1 VIEW: CPT | Mod: 26

## 2024-10-05 PROCEDURE — 99497 ADVNCD CARE PLAN 30 MIN: CPT | Mod: GC

## 2024-10-05 PROCEDURE — 73110 X-RAY EXAM OF WRIST: CPT | Mod: 26,RT

## 2024-10-05 RX ORDER — FUROSEMIDE 10 MG/ML
20 INJECTION INTRAVENOUS DAILY
Refills: 0 | Status: DISCONTINUED | OUTPATIENT
Start: 2024-10-05 | End: 2024-10-05

## 2024-10-05 RX ORDER — DILTIAZEM HCL 300 MG
60 CAPSULE, EXTENDED RELEASE 24HR ORAL AT BEDTIME
Refills: 0 | Status: DISCONTINUED | OUTPATIENT
Start: 2024-10-05 | End: 2024-10-06

## 2024-10-05 RX ORDER — FUROSEMIDE 10 MG/ML
20 INJECTION INTRAVENOUS
Refills: 0 | Status: DISCONTINUED | OUTPATIENT
Start: 2024-10-05 | End: 2024-10-08

## 2024-10-05 RX ADMIN — Medication 2 TABLET(S): at 21:11

## 2024-10-05 RX ADMIN — Medication 50 MILLIGRAM(S): at 17:34

## 2024-10-05 RX ADMIN — Medication 1000 MICROGRAM(S): at 13:23

## 2024-10-05 RX ADMIN — Medication 17 GRAM(S): at 05:10

## 2024-10-05 RX ADMIN — Medication 60 MILLIGRAM(S): at 13:23

## 2024-10-05 RX ADMIN — Medication 17 GRAM(S): at 17:34

## 2024-10-05 RX ADMIN — FUROSEMIDE 40 MILLIGRAM(S): 10 INJECTION INTRAVENOUS at 05:10

## 2024-10-05 RX ADMIN — Medication 50 MILLIGRAM(S): at 05:10

## 2024-10-05 RX ADMIN — ENOXAPARIN SODIUM 40 MILLIGRAM(S): 150 INJECTION SUBCUTANEOUS at 13:24

## 2024-10-05 RX ADMIN — ATORVASTATIN CALCIUM 20 MILLIGRAM(S): 10 TABLET, FILM COATED ORAL at 21:11

## 2024-10-05 RX ADMIN — Medication 60 MILLIGRAM(S): at 05:10

## 2024-10-05 NOTE — PROGRESS NOTE ADULT - CONVERSATION DETAILS
Discussed goals of care and advance directives with patient and her son at bedside. Pt is fully awake and oriented. Makes her own discission. She does not want to be resuscitated or intubated. DNR/DNI   Son agreed with her decisions  TEJINDER completed

## 2024-10-05 NOTE — PROGRESS NOTE ADULT - SUBJECTIVE AND OBJECTIVE BOX
Patient is a 89y old  Female who presents with a chief complaint of Fall (05 Oct 2024 10:49)      INTERVAL HPI/OVERNIGHT EVENTS:  Seen and examined together with residents and students. Son at bedside. No complains. Doing OK     MEDICATIONS  (STANDING):  atorvastatin 20 milliGRAM(s) Oral at bedtime  cyanocobalamin 1000 MICROGram(s) Oral daily  diltiazem    Tablet 60 milliGRAM(s) Oral four times a day  enoxaparin Injectable 40 milliGRAM(s) SubCutaneous every 24 hours  furosemide    Tablet 20 milliGRAM(s) Oral <User Schedule>  influenza  Vaccine (HIGH DOSE) 0.5 milliLiter(s) IntraMuscular once  lactated ringers. 1000 milliLiter(s) (100 mL/Hr) IV Continuous <Continuous>  metoprolol succinate ER 50 milliGRAM(s) Oral two times a day  polyethylene glycol 3350 17 Gram(s) Oral two times a day  senna 2 Tablet(s) Oral at bedtime    MEDICATIONS  (PRN):  acetaminophen     Tablet .. 650 milliGRAM(s) Oral every 6 hours PRN Moderate Pain (4 - 6)      Allergies    No Known Allergies    Intolerances        REVIEW OF SYSTEMS:  CONSTITUTIONAL: No fever, weight loss, or fatigue  RESPIRATORY: No cough, wheezing, chills or hemoptysis; No shortness of breath  CARDIOVASCULAR: No chest pain, palpitations, dizziness, or leg swelling  GASTROINTESTINAL: No abdominal or epigastric pain. No nausea, vomiting, or hematemesis; No diarrhea or constipation. No melena or hematochezia.  NEUROLOGICAL: No headaches, memory loss, loss of strength, numbness, or tremors  MUSCULOSKELETAL: No joint pain or swelling; No muscle, back, or extremity pain      Vital Signs Last 24 Hrs  T(C): 36.6 (05 Oct 2024 09:01), Max: 36.6 (04 Oct 2024 16:34)  T(F): 97.9 (05 Oct 2024 09:01), Max: 97.9 (05 Oct 2024 09:01)  HR: 68 (05 Oct 2024 09:01) (67 - 105)  BP: 125/80 (05 Oct 2024 09:01) (101/64 - 144/85)  BP(mean): --  RR: 18 (05 Oct 2024 09:01) (18 - 18)  SpO2: 99% (05 Oct 2024 09:01) (98% - 100%)    Parameters below as of 05 Oct 2024 09:01  Patient On (Oxygen Delivery Method): nasal cannula  O2 Flow (L/min): 2      PHYSICAL EXAM:  GENERAL: NAD, pleasant, fragile elderly lady, sitting on the bed   HEAD:  Atraumatic, Normocephalic  EYES:  conjunctiva and sclera clear  NECK: Supple, No JVD  NERVOUS SYSTEM:  Alert & Oriented X3, No gross focal deficits  CHEST/LUNG: Clear to percussion bilaterally; No rales, rhonchi, wheezing, or rubs  HEART: Irregular rate and rhythm; No murmurs, rubs, or gallops  ABDOMEN: Soft, Nontender, Nondistended; Bowel sounds present  EXTREMITIES:  right arm in sling, No clubbing, cyanosis, or edema  SKIN: No rashes or lesions    LABS:                        12.9   5.39  )-----------( 157      ( 05 Oct 2024 06:23 )             38.2     10-05    140  |  101  |  26.3[H]  ----------------------------<  106[H]  4.1   |  27.0  |  0.73    Ca    9.4      05 Oct 2024 06:23  Phos  3.7     10-05  Mg     2.0     10-05    TPro  6.2[L]  /  Alb  3.9  /  TBili  0.9  /  DBili  x   /  AST  18  /  ALT  20  /  AlkPhos  96  10-05    PTT - ( 05 Oct 2024 06:55 )  PTT:34.4 sec  Urinalysis Basic - ( 05 Oct 2024 06:23 )    Color: x / Appearance: x / SG: x / pH: x  Gluc: 106 mg/dL / Ketone: x  / Bili: x / Urobili: x   Blood: x / Protein: x / Nitrite: x   Leuk Esterase: x / RBC: x / WBC x   Sq Epi: x / Non Sq Epi: x / Bacteria: x      CAPILLARY BLOOD GLUCOSE          RADIOLOGY & ADDITIONAL TESTS:    Imaging Personally Reviewed:  [x ] YES  [ ] NO    Consultant(s) Notes Reviewed:  [ x] YES  [ ] NO    Care Discussed with Consultants/Other Providers [ x] YES  [ ] NO    Plan of Care discussed with Housestaff [x ]YES [ ] NO

## 2024-10-05 NOTE — PROGRESS NOTE ADULT - SUBJECTIVE AND OBJECTIVE BOX
INTERVAL HISTORY:  Sitting in a chair  Comfortable  No chest pain or shortness of breath  	  MEDICATIONS:  diltiazem    Tablet 60 milliGRAM(s) Oral four times a day  furosemide   Injectable 40 milliGRAM(s) IV Push daily  metoprolol succinate ER 50 milliGRAM(s) Oral two times a day    acetaminophen     Tablet .. 650 milliGRAM(s) Oral every 6 hours PRN    polyethylene glycol 3350 17 Gram(s) Oral two times a day  senna 2 Tablet(s) Oral at bedtime    atorvastatin 20 milliGRAM(s) Oral at bedtime    cyanocobalamin 1000 MICROGram(s) Oral daily  enoxaparin Injectable 40 milliGRAM(s) SubCutaneous every 24 hours  influenza  Vaccine (HIGH DOSE) 0.5 milliLiter(s) IntraMuscular once  lactated ringers. 1000 milliLiter(s) IV Continuous <Continuous>        PHYSICAL EXAM:    T(C): 36.6 (10-05-24 @ 09:01), Max: 36.6 (10-04-24 @ 16:34)  HR: 68 (10-05-24 @ 09:01) (67 - 105)  BP: 125/80 (10-05-24 @ 09:01) (101/64 - 144/85)  RR: 18 (10-05-24 @ 09:01) (18 - 18)  SpO2: 99% (10-05-24 @ 09:01) (98% - 100%)  Wt(kg): --    I&O's Summary    04 Oct 2024 07:01  -  05 Oct 2024 07:00  --------------------------------------------------------  IN: 0 mL / OUT: 1300 mL / NET: -1300 mL        Daily     Daily Weight in k (05 Oct 2024 04:47)    Appearance: Normal	  HEENT:   Normal oral mucosa, PERRL, EOMI	  Cardiovascular: Normal S1 S2, No JVD, No murmurs, No edema  Respiratory: Diminished bases	  Psychiatry: A & O x 3, Mood & affect appropriate  Gastrointestinal:  Soft, Non-tender, + BS	  Skin: No rashes, No ecchymoses, No cyanosis  Neurologic: Non-focal  Extremities:  No edema                            12.9   5.39  )-----------( 157      ( 05 Oct 2024 06:23 )             38.2     10-    140  |  101  |  26.3[H]  ----------------------------<  106[H]  4.1   |  27.0  |  0.73    Ca    9.4      05 Oct 2024 06:23  Phos  3.7     10-05  Mg     2.0     10-05    TPro  6.2[L]  /  Alb  3.9  /  TBili  0.9  /  DBili  x   /  AST  18  /  ALT  20  /  AlkPhos  96  10-05    proBNP:   Lipid Profile:   HgA1c:     ASSESSMENT/PLAN: 	    89 y.o female with PMHx left breast carcinoma s/p left mastectomy about 20 years ago, Afib on Eliquis as of 6 months ago, HTN, HLD presented to the ED following a fall at her [facility] home, where she fell onto her right side and had been laying on the ground for 4 hours before being found. The patient has been living at the nursing home for the past 2 years. She denies loss of consciousness and ED trauma scan was notable for a right distal radius fracture s/p reduction done by ortho service. CT chest in ED demonstrated a right middle lobe consolidation for which patient was placed on 3 day course of azithromycin and 5 day course of ceftriaxone.      Echo:  < from: TTE W or WO Ultrasound Enhancing Agent (10.04.24 @ 15:21) >  CONCLUSIONS:      1. Technically difficult image quality.   2. Left ventricular systolic function is normal with an ejection fraction of 60 % by Heredia's method of disks with an ejection fraction visually estimated at 55 to 60 %.   3. The left ventricular diastolic function is indeterminate.   4. Normal right ventricular cavity size and mildly reduced right ventricular systolic function.   5. Estimated pulmonary artery systolic pressure is40 mmHg, consistent with mild pulmonary hypertension.   6. Left atrium is mildly dilated.   7. The right atrium is mildly dilated.   8. Mild mitral valve leaflet calcification.   9. Mild mitral regurgitation.  10. Mild tricuspid regurgitation.  11. Fibrocalcific aortic valve sclerosis without stenosis.  12. 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  13. No pericardial effusion seen.  14. No prior echocardiogram is available for comparison.    < end of copied text >     PLAN:  S/p Mechanical fall/ A-Fib  A-Fib anticoagulation therapy is now contraindicated as Pt is S/p multiple recent falls  Change Diltiazem CD to Cardizem 60 mg QID  Continue Toprol XL 50 mg BID  Change to PO Lasix  Please reconsult PRN.

## 2024-10-05 NOTE — PROGRESS NOTE ADULT - ASSESSMENT
89 y.o female with PMHx left breast carcinoma s/p left mastectomy about 20 years ago, Afib on Eliquis as of 6 months ago, HTN, HLD presented to the ED following a fall at her [facility] home. CT Chest notable for right middle lobe consolidation on ED presentation.       #Mechanical Fall   -CT head negative for fracture and hemorrhage   -PT consult recommending Campos placement  Get out of bed to chair       #Mild Rhabdomyolysis   -Initial -704, currently downtrending   s/p IV fluids       #Right distal radius fracture   -reduced by ortho 9/29  -acetaminophen 650mg prn for pain  Xray arm done, pending results     #Acute hypoxic respiratory failure likely secondary to Community Acquired PNA and fluid overload   Completed course of antibiotics   Tapered off supplemental O2, currently on room air   d/c Iv furosemide and switch to home dose       #Chronic Afib  Rate controlled: c/w Metoprolol ER 50mg BID and taper Cardizem to 60 QID   Eliquis discontinued due to recent fall        #HTN - BP controlled       #HLD   -atorvastatin 20mg bedtime     DVT ppx: SCDs  Discharge planning to [facility]

## 2024-10-06 PROCEDURE — 99233 SBSQ HOSP IP/OBS HIGH 50: CPT | Mod: GC

## 2024-10-06 RX ORDER — DILTIAZEM HCL 300 MG
60 CAPSULE, EXTENDED RELEASE 24HR ORAL EVERY 8 HOURS
Refills: 0 | Status: DISCONTINUED | OUTPATIENT
Start: 2024-10-06 | End: 2024-10-08

## 2024-10-06 RX ADMIN — Medication 650 MILLIGRAM(S): at 15:44

## 2024-10-06 RX ADMIN — FUROSEMIDE 20 MILLIGRAM(S): 10 INJECTION INTRAVENOUS at 05:11

## 2024-10-06 RX ADMIN — ATORVASTATIN CALCIUM 20 MILLIGRAM(S): 10 TABLET, FILM COATED ORAL at 22:18

## 2024-10-06 RX ADMIN — Medication 50 MILLIGRAM(S): at 05:11

## 2024-10-06 RX ADMIN — Medication 1000 MICROGRAM(S): at 14:45

## 2024-10-06 RX ADMIN — Medication 650 MILLIGRAM(S): at 14:44

## 2024-10-06 RX ADMIN — Medication 17 GRAM(S): at 05:11

## 2024-10-06 RX ADMIN — Medication 2 TABLET(S): at 22:17

## 2024-10-06 RX ADMIN — Medication 60 MILLIGRAM(S): at 09:55

## 2024-10-06 RX ADMIN — Medication 60 MILLIGRAM(S): at 14:44

## 2024-10-06 RX ADMIN — ENOXAPARIN SODIUM 40 MILLIGRAM(S): 150 INJECTION SUBCUTANEOUS at 14:44

## 2024-10-06 RX ADMIN — Medication 60 MILLIGRAM(S): at 22:18

## 2024-10-06 RX ADMIN — Medication 50 MILLIGRAM(S): at 18:30

## 2024-10-06 NOTE — PROGRESS NOTE ADULT - ASSESSMENT
89 y.o female with PMHx left breast carcinoma s/p left mastectomy about 20 years ago, Afib on Eliquis as of 6 months ago, HTN, HLD presented to the ED following a fall at her [facility] home. CT Chest notable for right middle lobe consolidation on ED presentation.       #Mechanical Fall   -CT head negative for fracture and hemorrhage   -PT consult recommending Campos placement      #Rhabdomyolysis   -Initial -704, currently downtrending   -IVF   -serial monitoring     #Right distal radius fracture   -reduced by ortho 9/29  -acetaminophen 650mg prn for pain  -XRay wrist completed on 10/5    #Community Acquired PNA  -CT Chest showing right middle lobe focal consolidation  -on 2L NC now transitioned to room air  -Azithromycin 500mg 3 days (End date: 10/1/24)  -Ceftriaxone 1g daily 5 days (End date: 10/3/24)        #Afib  -Telemetry monitoring   -Metoprolol succinate 25mg BID, increased to 50mg BID per Cards recommendations  -Diltiazem 240 mg daily, decreased to 120mg daily per Cards recs   -Eliquis discontinued due to recent fall    -PE work up including UA, RVP, LE Dopplers, CTA  -CTA negative for PE on 10/4, positive for bilateral pleural effusions  -LE Dopplers negative for thrombi bilaterally on 10/4      #HTN  -diltiazem 240 mg daily, per cardiology recs   -Dosing of Cardizem decreased to 120 mg daily, now decreased to 60mg daily     #HLD   -atorvastatin 20mg bedtime     DVT ppx: SCDs  Dispo: D/c to [facility] once medically stable    89 y.o female with PMHx left breast carcinoma s/p left mastectomy about 20 years ago, Afib on Eliquis as of 6 months ago, HTN, HLD presented to the ED following a fall at her [facility]. CT Chest notable for right middle lobe consolidation on ED presentation.       #Mechanical Fall   -CT head negative for fracture and hemorrhage   -PT consult recommending Campos placement      #Rhabdomyolysis   -Initial -704, currently downtrending   -IVF   -serial monitoring     #Right distal radius fracture   -reduced by ortho 9/29  -acetaminophen 650mg prn for pain  -XRay wrist completed on 10/5    #AHRF secondary to Community Acquired PNA + fluid overload   -CT Chest showing right middle lobe focal consolidation  -on 2L NC now transitioned to room air  -Azithromycin 500mg 3 days (End date: 10/1/24)  -Ceftriaxone 1g daily 5 days (End date: 10/3/24)  -IV lasix transitioned to PO 20mg furosemide home dose       #Afib  -Telemetry monitoring   -Metoprolol succinate 25mg BID, increased to 50mg BID per Cards recommendations  -Diltiazem 240 mg daily, decreased to 120mg daily per Cards recs   -Eliquis discontinued due to recent fall    -PE work up including UA, RVP, LE Dopplers, CTA  -CTA negative for PE on 10/4, positive for bilateral pleural effusions  -LE Dopplers negative for thrombi bilaterally on 10/4      #HTN  -diltiazem 240 mg daily, per cardiology recs   -Dosing of Cardizem decreased to 120 mg daily, now decreased to 60mg daily     #HLD   -atorvastatin 20mg bedtime     DVT ppx: SCDs  Dispo: D/c to [facility] once medically stable    89 y.o female with PMHx left breast carcinoma s/p left mastectomy about 20 years ago, Afib on Eliquis as of 6 months ago, HTN, HLD presented to the ED following a fall at her [facility] home. CT Chest notable for right middle lobe consolidation on ED presentation.       #Mechanical Fall   -CT head negative for fracture and hemorrhage   -PT consult recommending Campos placement      #Rhabdomyolysis   -Initial -704, currently downtrending   -IVF   -serial monitoring     #Right distal radius fracture   -reduced by ortho 9/29  -acetaminophen 650mg prn for pain  -XRay wrist completed on 10/5 showing non joining of fractured ends   -Ortho intervention scheduled for 10/7    #AHRF secondary to Community Acquired PNA + fluid overload   -CT Chest showing right middle lobe focal consolidation  -on 2L NC now transitioned to room air, back on 2LNC on 10/6 for dyspnea   -Azithromycin 500mg 3 days (End date: 10/1/24)  -Ceftriaxone 1g daily 5 days (End date: 10/3/24)  -IV lasix transitioned to PO 20mg furosemide home dose       #Afib  -Telemetry monitoring   -Metoprolol succinate 25mg BID, increased to 50mg BID per Cards recommendations  -Diltiazem 240 mg daily, decreased to 120mg daily per Cards recs   -Eliquis discontinued due to recent fall    -PE work up including UA, RVP, LE Dopplers, CTA  -CTA negative for PE on 10/4, positive for bilateral pleural effusions  -LE Dopplers negative for thrombi bilaterally on 10/4      #HTN  -diltiazem 240 mg daily, per cardiology recs   -Dosing of Cardizem decreased to 120 mg daily, now decreased to 60mg daily     #HLD   -atorvastatin 20mg bedtime     DVT ppx: SCDs  Dispo: D/c to [facility] once medically stable

## 2024-10-06 NOTE — PROGRESS NOTE ADULT - SUBJECTIVE AND OBJECTIVE BOX
HPI  89 y.o female with PMHx left breast carcinoma s/p left mastectomy about 20 years ago, Afib on Eliquis as of 6 months ago, HTN, HLD presented to the ED following a fall at her [facility], where she fell onto her right side and had been laying on the ground for 4 hours before being found. The patient has been living at the nursing home for the past 2 years. She denies loss of consciousness and ED trauma scan was notable for a right distal radius fracture s/p reduction done by ortho service. CT chest in ED demonstrated a right middle lobe consolidation for which patient was placed on 3 day course of azithromycin and 5 day course of ceftriaxone.     INTERVAL EVENTS      HOSPITAL COURSE  Patient presented to the ED on 9/29 following a fall with labs concerning for rhabdomyolysis. Trauma scan was positive for fractured distal radius that was reduced by Ortho on 9/29. CT Chest demonstrated right middle lobe consolidation for which patient was placed on antibiotics. Her telemetry monitoring alarmed on 9/29 following an episode of hypotension and bradycardia with Afib converted to Aflutter responded to with IVF. On 10/2, Pt was seen by cardiology following her telemetry alarm on 9/29, recommending discontinue of Eliquis following her fall in addition to furosemide 20mg PO every other day. In the morning, pt was tachycardic with rates in the 130s. Patient received her scheduled Cardizem and metoprolol. Patient was in sustained Afib with RVR to 130s on 10/4. Primary team pursued PE workup on 10/4 with CTA showing negative PE but bilateral pleural effusions. LE dopplers negative for DVT.       REVIEW OF SYSTEMS   General: No fatigue, decreased apatite, weight loss  HEENT: No cough, throat pain, rhinorrhea hemoptysis    Chest: No shortness of breath, chest pain  Abdomen: No abdominal pain, hematemesis   Genitourinary: No dysuria, No hematuria   Extremities: No joint pain, no change in range of motion  Skin: No rashes, ecchymoses purpura, nodules       MEDICATIONS  MEDICATIONS  (STANDING):  atorvastatin 20 milliGRAM(s) Oral at bedtime  cyanocobalamin 1000 MICROGram(s) Oral daily  diltiazem   CD 60 milliGRAM(s) Oral at bedtime  enoxaparin Injectable 40 milliGRAM(s) SubCutaneous every 24 hours  furosemide    Tablet 20 milliGRAM(s) Oral <User Schedule>  influenza  Vaccine (HIGH DOSE) 0.5 milliLiter(s) IntraMuscular once  lactated ringers. 1000 milliLiter(s) (100 mL/Hr) IV Continuous <Continuous>  metoprolol succinate ER 50 milliGRAM(s) Oral two times a day  polyethylene glycol 3350 17 Gram(s) Oral two times a day  senna 2 Tablet(s) Oral at bedtime    MEDICATIONS  (PRN):  acetaminophen     Tablet .. 650 milliGRAM(s) Oral every 6 hours PRN Moderate Pain (4 - 6)      ALLERGIES  Allergies    No Known Allergies    Intolerances        PHYSICAL EXAM   Vital Signs Last 24 Hrs  T(C): 36.3 (06 Oct 2024 04:49), Max: 37.1 (05 Oct 2024 16:49)  T(F): 97.4 (06 Oct 2024 04:49), Max: 98.7 (05 Oct 2024 16:49)  HR: 137 (06 Oct 2024 04:49) (68 - 137)  BP: 135/101 (06 Oct 2024 04:49) (121/86 - 135/101)  BP(mean): --  RR: 18 (06 Oct 2024 04:49) (18 - 18)  SpO2: 91% (06 Oct 2024 04:49) (91% - 99%)    Parameters below as of 06 Oct 2024 04:49  Patient On (Oxygen Delivery Method): nasal cannula  O2 Flow (L/min): 2      T(C): 36.3 (10-06-24 @ 04:49), Max: 37.1 (10-05-24 @ 16:49)  HR: 137 (10-06-24 @ 04:49) (68 - 137)  BP: 135/101 (10-06-24 @ 04:49) (121/86 - 135/101)  RR: 18 (10-06-24 @ 04:49) (18 - 18)  SpO2: 91% (10-06-24 @ 04:49) (91% - 99%)    CONSTITUTIONAL: Well groomed, no apparent distress  EYES: PERRLA and symmetric, EOMI, No conjunctival or scleral injection, non-icteric  ENMT: Oral mucosa with moist membranes. Normal dentition; no pharyngeal injection or exudates             NECK: Supple, symmetric and without tracheal deviation   RESP: No respiratory distress, no use of accessory muscles; CTA b/l, no WRR  CV: RRR, +S1S2, no MRG; no JVD; no peripheral edema  GI: Soft, NT, ND, no rebound, no guarding; no palpable masses; no hepatosplenomegaly; no hernia palpated  LYMPH: No cervical LAD or tenderness; no axillary LAD or tenderness; no inguinal LAD or tenderness  MSK: Normal gait; No digital clubbing or cyanosis; examination of the (head/neck/spine/ribs/pelvis, RUE, LUE, RLE, LLE) without misalignment,            Normal ROM without pain, no spinal tenderness, normal muscle strength/tone  SKIN: No rashes or ulcers noted; no subcutaneous nodules or induration palpable  NEURO: CN II-XII intact; normal reflexes in upper and lower extremities, sensation intact in upper and lower extremities b/l to light touch   PSYCH: Appropriate insight/judgment; A+O x 3, mood and affect appropriate, recent/remote memory intact      LABS                        12.9   5.39  )-----------( 157      ( 05 Oct 2024 06:23 )             38.2       CBC Full  -  ( 05 Oct 2024 06:23 )  WBC Count : 5.39 K/uL  RBC Count : 4.54 M/uL  Hemoglobin : 12.9 g/dL  Hematocrit : 38.2 %  Platelet Count - Automated : 157 K/uL  Mean Cell Volume : 84.1 fl  Mean Cell Hemoglobin : 28.4 pg  Mean Cell Hemoglobin Concentration : 33.8 gm/dL  Auto Neutrophil # : x  Auto Lymphocyte # : x  Auto Monocyte # : x  Auto Eosinophil # : x  Auto Basophil # : x  Auto Neutrophil % : x  Auto Lymphocyte % : x  Auto Monocyte % : x  Auto Eosinophil % : x  Auto Basophil % : x      10-05    140  |  101  |  26.3[H]  ----------------------------<  106[H]  4.1   |  27.0  |  0.73    Ca    9.4      05 Oct 2024 06:23  Phos  3.7     10-05  Mg     2.0     10-05    TPro  6.2[L]  /  Alb  3.9  /  TBili  0.9  /  DBili  x   /  AST  18  /  ALT  20  /  AlkPhos  96  10-05      CAPILLARY BLOOD GLUCOSE          PTT - ( 05 Oct 2024 06:55 )  PTT:34.4 sec    Urinalysis Basic - ( 05 Oct 2024 06:23 )    Color: x / Appearance: x / SG: x / pH: x  Gluc: 106 mg/dL / Ketone: x  / Bili: x / Urobili: x   Blood: x / Protein: x / Nitrite: x   Leuk Esterase: x / RBC: x / WBC x   Sq Epi: x / Non Sq Epi: x / Bacteria: x        IMAGING     TTE CONCLUSIONS:      1. Technically difficult image quality.   2. Left ventricular systolic function is normal with an ejection fraction of 60 % by Heredia's method of disks with an ejection fraction visually estimated at 55 to 60 %.   3. The left ventricular diastolic function is indeterminate.   4. Normal right ventricular cavity size and mildly reduced right ventricular systolic function.   5. Estimated pulmonary artery systolic pressure is 40 mmHg, consistent with mild pulmonary hypertension.   6. Left atrium is mildly dilated.   7. The right atrium is mildly dilated.   8. Mild mitral valve leaflet calcification.   9. Mild mitral regurgitation.  10. Mild tricuspid regurgitation.  11. Fibrocalcific aortic valve sclerosis without stenosis.  12. 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  13. No pericardial effusion seen.  14. No prior echocardiogram is available for comparison.    ________________________________________________________________________________________  FINDINGS:     Left Ventricle:  Left ventricular systolic function is normal with a calculated ejection fraction of 60 % by the Heredia's biplane method of disks. The left ventricular diastolic function is indeterminate.     Right Ventricle:  The right ventricular cavity is normal in size and right ventricular systolic function is mildly reduced. Tricuspid annular plane systolic excursion (TAPSE) is 1.3 cm (normal >=1.7 cm).     Left Atrium:  The left atrium is mildly dilated with an indexed volume of 29.14 ml/m².     Right Atrium:  The right atrium is mildly dilated.     Interatrial Septum:  The interatrial septum appears intact.     Aortic Valve:  The aortic valve appears trileaflet with normal systolic excursion. There is fibrocalcific aortic valve sclerosis without stenosis. There is trace aortic regurgitation.     Mitral Valve:  Structurally normal mitral valve with normal leaflet excursion. There is mitral valve thickening of the anterior and posterior leaflets. There is mild leaflet calcification. There is mild mitral regurgitation.     Tricuspid Valve:  Structurally normal tricuspid valve with normal leaflet excursion. There is mild tricuspid regurgitation. Estimated pulmonary artery systolic pressure is 40 mmHg, consistent with mild pulmonary hypertension.     Pulmonic Valve:  Structurally normal pulmonic valve with normal leaflet excursion. There is trace pulmonic regurgitation.     Aorta:  The aortic root at the sinuses of Valsalva is normal in size. The ascending aorta is dilated, measuring 4.00 cm (indexed 2.41 cm/m²).     Pericardium:  No pericardial effusion seen.     Systemic Veins:  The inferior vena cava is normal in size (normal <2.1cm) with normal inspiratory collapse (normal >50%) consistent with normal right atrial pressure (~3, range 0-5mmHg).  ____________________________________________________________________  QUANTITATIVE DATA:  Left Ventricle Measurements: (Indexed to BSA)     IVSd (2D):   1.1 cm  LVPWd (2D):  0.9 cm  LVIDd (2D):  3.5 cm  LVIDs (2D):  2.5 cm  LV Mass:     103 g  62.4 g/m²  LV Vol d, MOD A2C: 46.9 ml 28.30 ml/m²  LV Vol d, MOD A4C: 27.5 ml 16.59 ml/m²  LV Vol d, MOD BP:  37.1 ml 22.36 ml/m²  LV Vol s, MOD A2C: 16.1 ml 9.71 ml/m²  LV Vol s, MOD A4C: 13.2 ml 7.96 ml/m²  LV Vol s, MOD BP:  14.8 ml 8.91 ml/m²  LVOT SV MOD BP:    22.3 ml  LV EF% MOD BP:     60 %     MV E Vmax:    1.14 m/s  MV A Vmax:    0.39 m/s  MV E/A:       2.92  e' lateral:   8.59 cm/s  e' medial:    7.29 cm/s  E/e' lateral: 13.27  E/e' medial:  15.64  E/e' Average: 14.36  MV DT:        204 msec    Aorta Measurements: (Normal range) (Indexed to BSA)     Ao Root d     3.40 cm (2.7 - 3.3 cm) 2.05 cm/m²  Ao Asc d, 2D: 4.00  Ao Asc prox:  4.00 cm                2.41 cm/m²            Left Atrium Measurements: (Indexed to BSA)  LA Diam 2D:        3.20 cm  LA Vol s, MOD A4C: 74.50 ml.  LA Vol s, MOD A2C: 30.60 ml.  LA Vol s, MOD BP:  48.30 ml  29.14 ml/m²         Right Ventricle Measurements:     TAPSE: 1.3 cm       LVOT / RVOT/ Qp/Qs Data: (Indexed to BSA)  LVOT Diameter: 2.20 cm  LVOT Area:     3.80 cm²    Aortic Valve Measurements:  AV Vmax:          1.1 m/s  AV Peak Gradient: 4.5 mmHg    Mitral Valve Measurements:     MV E Vmax: 1.1 m/s  MV A Vmax: 0.4 m/s  MV E/A:    2.9       Tricuspid Valve Measurements:     TR Vmax:          3.0 m/s  TR Peak Gradient: 37.0 mmHg  RA Pressure:      3 mmHg  PASP:             40 mmHg         HPI  89 y.o female with PMHx left breast carcinoma s/p left mastectomy about 20 years ago, Afib on Eliquis as of 6 months ago, HTN, HLD presented to the ED following a fall at her [facility], where she fell onto her right side and had been laying on the ground for 4 hours before being found. The patient has been living at the nursing home for the past 2 years. She denies loss of consciousness and ED trauma scan was notable for a right distal radius fracture s/p reduction done by ortho service. CT chest in ED demonstrated a right middle lobe consolidation for which patient was placed on 3 day course of azithromycin and 5 day course of ceftriaxone.     INTERVAL EVENTS  Patient continues to endorse difficulty breathing. She was put back on 2LNC and has been out of bed to chair.     HOSPITAL COURSE  Patient presented to the ED on 9/29 following a fall with labs concerning for rhabdomyolysis. Trauma scan was positive for fractured distal radius that was reduced by Ortho on 9/29. CT Chest demonstrated right middle lobe consolidation for which patient was placed on antibiotics. Her telemetry monitoring alarmed on 9/29 following an episode of hypotension and bradycardia with Afib converted to Aflutter responded to with IVF. On 10/2, Pt was seen by cardiology following her telemetry alarm on 9/29, recommending discontinue of Eliquis following her fall in addition to furosemide 20mg PO every other day. In the morning, pt was tachycardic with rates in the 130s. Patient received her scheduled Cardizem and metoprolol. Patient was in sustained Afib with RVR to 130s on 10/4. Primary team pursued PE workup on 10/4 with CTA showing negative PE but bilateral pleural effusions. LE dopplers negative for DVT. Patient weaned to room air on 10/5. Patient reports difficulty breathing on 10/6 and was placed on 2LNC.       REVIEW OF SYSTEMS Endorsing difficulty breathing  General: No fatigue, decreased apatite, weight loss  HEENT: No cough, throat pain, rhinorrhea hemoptysis    Chest: shortness of breath, no chest pain  Abdomen: No abdominal pain, hematemesis   Genitourinary: No dysuria, No hematuria   Extremities: No joint pain, no change in range of motion  Skin: No rashes, ecchymoses purpura, nodules       MEDICATIONS  MEDICATIONS  (STANDING):  atorvastatin 20 milliGRAM(s) Oral at bedtime  cyanocobalamin 1000 MICROGram(s) Oral daily  diltiazem   CD 60 milliGRAM(s) Oral at bedtime  enoxaparin Injectable 40 milliGRAM(s) SubCutaneous every 24 hours  furosemide    Tablet 20 milliGRAM(s) Oral <User Schedule>  influenza  Vaccine (HIGH DOSE) 0.5 milliLiter(s) IntraMuscular once  lactated ringers. 1000 milliLiter(s) (100 mL/Hr) IV Continuous <Continuous>  metoprolol succinate ER 50 milliGRAM(s) Oral two times a day  polyethylene glycol 3350 17 Gram(s) Oral two times a day  senna 2 Tablet(s) Oral at bedtime    MEDICATIONS  (PRN):  acetaminophen     Tablet .. 650 milliGRAM(s) Oral every 6 hours PRN Moderate Pain (4 - 6)      ALLERGIES  Allergies    No Known Allergies    Intolerances        PHYSICAL EXAM   Vital Signs Last 24 Hrs  T(C): 36.3 (06 Oct 2024 04:49), Max: 37.1 (05 Oct 2024 16:49)  T(F): 97.4 (06 Oct 2024 04:49), Max: 98.7 (05 Oct 2024 16:49)  HR: 137 (06 Oct 2024 04:49) (68 - 137)  BP: 135/101 (06 Oct 2024 04:49) (121/86 - 135/101)  BP(mean): --  RR: 18 (06 Oct 2024 04:49) (18 - 18)  SpO2: 91% (06 Oct 2024 04:49) (91% - 99%)    Parameters below as of 06 Oct 2024 04:49  Patient On (Oxygen Delivery Method): nasal cannula  O2 Flow (L/min): 2      T(C): 36.3 (10-06-24 @ 04:49), Max: 37.1 (10-05-24 @ 16:49)  HR: 137 (10-06-24 @ 04:49) (68 - 137)  BP: 135/101 (10-06-24 @ 04:49) (121/86 - 135/101)  RR: 18 (10-06-24 @ 04:49) (18 - 18)  SpO2: 91% (10-06-24 @ 04:49) (91% - 99%)    CONSTITUTIONAL: Well groomed, no apparent distress  EYES: PERRLA and symmetric, EOMI, No conjunctival or scleral injection, non-icteric  ENMT: Oral mucosa with moist membranes. Normal dentition; no pharyngeal injection or exudates  NECK:  without tracheal deviation   RESP: No respiratory distress, no use of accessory muscles; CTA b/l, no WRR  CV: RRR, +S1S2, no MRG; no JVD; no peripheral edema  GI: Soft, NT, ND, no rebound, no guarding  MSK: lower muscle density in LLE compared to RLE   SKIN: No rashes or ulcers noted  NEURO: sensation intact in upper and lower extremities b/l to light touch   PSYCH: Appropriate insight/judgment; A+O x 4, mood and affect appropriate, recent/remote memory intact      LABS                        12.9   5.39  )-----------( 157      ( 05 Oct 2024 06:23 )             38.2       CBC Full  -  ( 05 Oct 2024 06:23 )  WBC Count : 5.39 K/uL  RBC Count : 4.54 M/uL  Hemoglobin : 12.9 g/dL  Hematocrit : 38.2 %  Platelet Count - Automated : 157 K/uL  Mean Cell Volume : 84.1 fl  Mean Cell Hemoglobin : 28.4 pg  Mean Cell Hemoglobin Concentration : 33.8 gm/dL  Auto Neutrophil # : x  Auto Lymphocyte # : x  Auto Monocyte # : x  Auto Eosinophil # : x  Auto Basophil # : x  Auto Neutrophil % : x  Auto Lymphocyte % : x  Auto Monocyte % : x  Auto Eosinophil % : x  Auto Basophil % : x      10-05    140  |  101  |  26.3[H]  ----------------------------<  106[H]  4.1   |  27.0  |  0.73    Ca    9.4      05 Oct 2024 06:23  Phos  3.7     10-05  Mg     2.0     10-05    TPro  6.2[L]  /  Alb  3.9  /  TBili  0.9  /  DBili  x   /  AST  18  /  ALT  20  /  AlkPhos  96  10-05      CAPILLARY BLOOD GLUCOSE          PTT - ( 05 Oct 2024 06:55 )  PTT:34.4 sec    Urinalysis Basic - ( 05 Oct 2024 06:23 )    Color: x / Appearance: x / SG: x / pH: x  Gluc: 106 mg/dL / Ketone: x  / Bili: x / Urobili: x   Blood: x / Protein: x / Nitrite: x   Leuk Esterase: x / RBC: x / WBC x   Sq Epi: x / Non Sq Epi: x / Bacteria: x        IMAGING     TTE CONCLUSIONS:      1. Technically difficult image quality.   2. Left ventricular systolic function is normal with an ejection fraction of 60 % by Heredia's method of disks with an ejection fraction visually estimated at 55 to 60 %.   3. The left ventricular diastolic function is indeterminate.   4. Normal right ventricular cavity size and mildly reduced right ventricular systolic function.   5. Estimated pulmonary artery systolic pressure is 40 mmHg, consistent with mild pulmonary hypertension.   6. Left atrium is mildly dilated.   7. The right atrium is mildly dilated.   8. Mild mitral valve leaflet calcification.   9. Mild mitral regurgitation.  10. Mild tricuspid regurgitation.  11. Fibrocalcific aortic valve sclerosis without stenosis.  12. 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  13. No pericardial effusion seen.  14. No prior echocardiogram is available for comparison.    ________________________________________________________________________________________  FINDINGS:     Left Ventricle:  Left ventricular systolic function is normal with a calculated ejection fraction of 60 % by the Heredia's biplane method of disks. The left ventricular diastolic function is indeterminate.     Right Ventricle:  The right ventricular cavity is normal in size and right ventricular systolic function is mildly reduced. Tricuspid annular plane systolic excursion (TAPSE) is 1.3 cm (normal >=1.7 cm).     Left Atrium:  The left atrium is mildly dilated with an indexed volume of 29.14 ml/m².     Right Atrium:  The right atrium is mildly dilated.     Interatrial Septum:  The interatrial septum appears intact.     Aortic Valve:  The aortic valve appears trileaflet with normal systolic excursion. There is fibrocalcific aortic valve sclerosis without stenosis. There is trace aortic regurgitation.     Mitral Valve:  Structurally normal mitral valve with normal leaflet excursion. There is mitral valve thickening of the anterior and posterior leaflets. There is mild leaflet calcification. There is mild mitral regurgitation.     Tricuspid Valve:  Structurally normal tricuspid valve with normal leaflet excursion. There is mild tricuspid regurgitation. Estimated pulmonary artery systolic pressure is 40 mmHg, consistent with mild pulmonary hypertension.     Pulmonic Valve:  Structurally normal pulmonic valve with normal leaflet excursion. There is trace pulmonic regurgitation.     Aorta:  The aortic root at the sinuses of Valsalva is normal in size. The ascending aorta is dilated, measuring 4.00 cm (indexed 2.41 cm/m²).     Pericardium:  No pericardial effusion seen.     Systemic Veins:  The inferior vena cava is normal in size (normal <2.1cm) with normal inspiratory collapse (normal >50%) consistent with normal right atrial pressure (~3, range 0-5mmHg).  ____________________________________________________________________  QUANTITATIVE DATA:  Left Ventricle Measurements: (Indexed to BSA)     IVSd (2D):   1.1 cm  LVPWd (2D):  0.9 cm  LVIDd (2D):  3.5 cm  LVIDs (2D):  2.5 cm  LV Mass:     103 g  62.4 g/m²  LV Vol d, MOD A2C: 46.9 ml 28.30 ml/m²  LV Vol d, MOD A4C: 27.5 ml 16.59 ml/m²  LV Vol d, MOD BP:  37.1 ml 22.36 ml/m²  LV Vol s, MOD A2C: 16.1 ml 9.71 ml/m²  LV Vol s, MOD A4C: 13.2 ml 7.96 ml/m²  LV Vol s, MOD BP:  14.8 ml 8.91 ml/m²  LVOT SV MOD BP:    22.3 ml  LV EF% MOD BP:     60 %     MV E Vmax:    1.14 m/s  MV A Vmax:    0.39 m/s  MV E/A:       2.92  e' lateral:   8.59 cm/s  e' medial:    7.29 cm/s  E/e' lateral: 13.27  E/e' medial:  15.64  E/e' Average: 14.36  MV DT:        204 msec    Aorta Measurements: (Normal range) (Indexed to BSA)     Ao Root d     3.40 cm (2.7 - 3.3 cm) 2.05 cm/m²  Ao Asc d, 2D: 4.00  Ao Asc prox:  4.00 cm                2.41 cm/m²            Left Atrium Measurements: (Indexed to BSA)  LA Diam 2D:        3.20 cm  LA Vol s, MOD A4C: 74.50 ml.  LA Vol s, MOD A2C: 30.60 ml.  LA Vol s, MOD BP:  48.30 ml  29.14 ml/m²         Right Ventricle Measurements:     TAPSE: 1.3 cm       LVOT / RVOT/ Qp/Qs Data: (Indexed to BSA)  LVOT Diameter: 2.20 cm  LVOT Area:     3.80 cm²    Aortic Valve Measurements:  AV Vmax:          1.1 m/s  AV Peak Gradient: 4.5 mmHg    Mitral Valve Measurements:     MV E Vmax: 1.1 m/s  MV A Vmax: 0.4 m/s  MV E/A:    2.9       Tricuspid Valve Measurements:     TR Vmax:          3.0 m/s  TR Peak Gradient: 37.0 mmHg  RA Pressure:      3 mmHg  PASP:             40 mmHg    < from: Xray Wrist 3 Views, Right (10.05.24 @ 11:35) >  ACC: [id number] EXAM:  XR WRIST COMP MIN 3 VIEWS RT       PROCEDURE DATE:  10/05/2024          INTERPRETATION:  XR WRIST COMPLETE 3 VIEWS RIGHT    INDICATION:  s/p fracture.    FINDINGS:  Comminuted intra-articular fracture distally in the right   radius and a transverse fracture distally within the ulna. There is   severe displacement of the fracture fragments and this has increased   since the prior study. Overall bony density is severely decreased..    IMPRESSION:  Intra-articular fracture with increasing displacement of the   fracture fragments compared to the prior study.    --- End of Report ---    < end of copied text >

## 2024-10-07 LAB
ALBUMIN SERPL ELPH-MCNC: 3.6 G/DL — SIGNIFICANT CHANGE UP (ref 3.3–5.2)
ALP SERPL-CCNC: 89 U/L — SIGNIFICANT CHANGE UP (ref 40–120)
ALT FLD-CCNC: 15 U/L — SIGNIFICANT CHANGE UP
ANION GAP SERPL CALC-SCNC: 13 MMOL/L — SIGNIFICANT CHANGE UP (ref 5–17)
AST SERPL-CCNC: 13 U/L — SIGNIFICANT CHANGE UP
BILIRUB SERPL-MCNC: 0.8 MG/DL — SIGNIFICANT CHANGE UP (ref 0.4–2)
BUN SERPL-MCNC: 34 MG/DL — HIGH (ref 8–20)
CALCIUM SERPL-MCNC: 9.2 MG/DL — SIGNIFICANT CHANGE UP (ref 8.4–10.5)
CHLORIDE SERPL-SCNC: 99 MMOL/L — SIGNIFICANT CHANGE UP (ref 96–108)
CO2 SERPL-SCNC: 28 MMOL/L — SIGNIFICANT CHANGE UP (ref 22–29)
CREAT SERPL-MCNC: 0.86 MG/DL — SIGNIFICANT CHANGE UP (ref 0.5–1.3)
EGFR: 63 ML/MIN/1.73M2 — SIGNIFICANT CHANGE UP
GLUCOSE SERPL-MCNC: 90 MG/DL — SIGNIFICANT CHANGE UP (ref 70–99)
HCT VFR BLD CALC: 37.6 % — SIGNIFICANT CHANGE UP (ref 34.5–45)
HGB BLD-MCNC: 12.6 G/DL — SIGNIFICANT CHANGE UP (ref 11.5–15.5)
MAGNESIUM SERPL-MCNC: 2.1 MG/DL — SIGNIFICANT CHANGE UP (ref 1.6–2.6)
MCHC RBC-ENTMCNC: 28.5 PG — SIGNIFICANT CHANGE UP (ref 27–34)
MCHC RBC-ENTMCNC: 33.5 GM/DL — SIGNIFICANT CHANGE UP (ref 32–36)
MCV RBC AUTO: 85.1 FL — SIGNIFICANT CHANGE UP (ref 80–100)
PHOSPHATE SERPL-MCNC: 3.5 MG/DL — SIGNIFICANT CHANGE UP (ref 2.4–4.7)
PLATELET # BLD AUTO: 159 K/UL — SIGNIFICANT CHANGE UP (ref 150–400)
POTASSIUM SERPL-MCNC: 3.7 MMOL/L — SIGNIFICANT CHANGE UP (ref 3.5–5.3)
POTASSIUM SERPL-SCNC: 3.7 MMOL/L — SIGNIFICANT CHANGE UP (ref 3.5–5.3)
PROT SERPL-MCNC: 5.8 G/DL — LOW (ref 6.6–8.7)
RBC # BLD: 4.42 M/UL — SIGNIFICANT CHANGE UP (ref 3.8–5.2)
RBC # FLD: 14.5 % — SIGNIFICANT CHANGE UP (ref 10.3–14.5)
SODIUM SERPL-SCNC: 140 MMOL/L — SIGNIFICANT CHANGE UP (ref 135–145)
WBC # BLD: 4.72 K/UL — SIGNIFICANT CHANGE UP (ref 3.8–10.5)
WBC # FLD AUTO: 4.72 K/UL — SIGNIFICANT CHANGE UP (ref 3.8–10.5)

## 2024-10-07 PROCEDURE — 73110 X-RAY EXAM OF WRIST: CPT | Mod: 26,RT

## 2024-10-07 PROCEDURE — 99233 SBSQ HOSP IP/OBS HIGH 50: CPT | Mod: GC

## 2024-10-07 RX ORDER — LIDOCAINE HCL 20 MG/ML
15 AMPUL (ML) INJECTION ONCE
Refills: 0 | Status: DISCONTINUED | OUTPATIENT
Start: 2024-10-07 | End: 2024-10-08

## 2024-10-07 RX ORDER — TRAMADOL HYDROCHLORIDE 50 MG/1
25 TABLET, COATED ORAL EVERY 8 HOURS
Refills: 0 | Status: DISCONTINUED | OUTPATIENT
Start: 2024-10-07 | End: 2024-10-08

## 2024-10-07 RX ORDER — ACETAMINOPHEN 325 MG
650 TABLET ORAL EVERY 6 HOURS
Refills: 0 | Status: DISCONTINUED | OUTPATIENT
Start: 2024-10-07 | End: 2024-10-08

## 2024-10-07 RX ORDER — TRAMADOL HYDROCHLORIDE 50 MG/1
25 TABLET, COATED ORAL DAILY
Refills: 0 | Status: DISCONTINUED | OUTPATIENT
Start: 2024-10-07 | End: 2024-10-07

## 2024-10-07 RX ADMIN — Medication 650 MILLIGRAM(S): at 09:45

## 2024-10-07 RX ADMIN — Medication 60 MILLIGRAM(S): at 05:33

## 2024-10-07 RX ADMIN — Medication 1000 MICROGRAM(S): at 13:35

## 2024-10-07 RX ADMIN — ENOXAPARIN SODIUM 40 MILLIGRAM(S): 150 INJECTION SUBCUTANEOUS at 13:36

## 2024-10-07 RX ADMIN — TRAMADOL HYDROCHLORIDE 25 MILLIGRAM(S): 50 TABLET, COATED ORAL at 13:44

## 2024-10-07 RX ADMIN — Medication 650 MILLIGRAM(S): at 22:22

## 2024-10-07 RX ADMIN — TRAMADOL HYDROCHLORIDE 25 MILLIGRAM(S): 50 TABLET, COATED ORAL at 13:35

## 2024-10-07 RX ADMIN — Medication 60 MILLIGRAM(S): at 22:02

## 2024-10-07 RX ADMIN — Medication 2 TABLET(S): at 22:02

## 2024-10-07 RX ADMIN — ATORVASTATIN CALCIUM 20 MILLIGRAM(S): 10 TABLET, FILM COATED ORAL at 22:02

## 2024-10-07 RX ADMIN — Medication 650 MILLIGRAM(S): at 08:48

## 2024-10-07 RX ADMIN — Medication 50 MILLIGRAM(S): at 05:33

## 2024-10-07 RX ADMIN — Medication 17 GRAM(S): at 05:33

## 2024-10-07 RX ADMIN — Medication 60 MILLIGRAM(S): at 13:35

## 2024-10-07 RX ADMIN — Medication 50 MILLIGRAM(S): at 17:11

## 2024-10-07 RX ADMIN — Medication 650 MILLIGRAM(S): at 14:38

## 2024-10-07 RX ADMIN — Medication 650 MILLIGRAM(S): at 22:02

## 2024-10-07 NOTE — CHART NOTE - NSCHARTNOTEFT_GEN_A_CORE
Scheduled to have wrist reduction procedure today. She can receive approximately 10-15 cc of 1% -2% lidocaine for nerve block.
89 yo F, Right distal radius fracture.  Fracture reduced and splinted on 09/29  X-rays from yesterday showed the fracture re-displaced: distal radius shortened, carpus dorsally translated, with severe comminutions, etc...  I called her son (suggested by the patient) and discussed with him about her condition. Due to her cardiac and lung issues I suggested another attempt of closed reduction under local anesthesia and hope to improve the reduction and alignment. Her son Jens consented for us to do so.  We will plan on it and get it done this evening.    Bernadette Sims MD  On-call Orthopaedist

## 2024-10-07 NOTE — DIETITIAN INITIAL EVALUATION ADULT - PERTINENT MEDS FT
MEDICATIONS  (STANDING):  atorvastatin 20 milliGRAM(s) Oral at bedtime  cyanocobalamin 1000 MICROGram(s) Oral daily  diltiazem    Tablet 60 milliGRAM(s) Oral every 8 hours  enoxaparin Injectable 40 milliGRAM(s) SubCutaneous every 24 hours  furosemide    Tablet 20 milliGRAM(s) Oral <User Schedule>  influenza  Vaccine (HIGH DOSE) 0.5 milliLiter(s) IntraMuscular once  lactated ringers. 1000 milliLiter(s) (100 mL/Hr) IV Continuous <Continuous>  metoprolol succinate ER 50 milliGRAM(s) Oral two times a day  polyethylene glycol 3350 17 Gram(s) Oral two times a day  senna 2 Tablet(s) Oral at bedtime  traMADol 25 milliGRAM(s) Oral daily    MEDICATIONS  (PRN):  acetaminophen     Tablet .. 650 milliGRAM(s) Oral every 6 hours PRN Moderate Pain (4 - 6)

## 2024-10-07 NOTE — DIETITIAN INITIAL EVALUATION ADULT - PERTINENT LABORATORY DATA
10-07    140  |  99  |  34.0[H]  ----------------------------<  90  3.7   |  28.0  |  0.86    Ca    9.2      07 Oct 2024 04:46  Phos  3.5     10-07  Mg     2.1     10-07    TPro  5.8[L]  /  Alb  3.6  /  TBili  0.8  /  DBili  x   /  AST  13  /  ALT  15  /  AlkPhos  89  10-07  A1C with Estimated Average Glucose Result: 5.7 % (11-30-23 @ 06:39)

## 2024-10-07 NOTE — PROGRESS NOTE ADULT - ASSESSMENT
89 y.o female with PMHx left breast carcinoma s/p left mastectomy about 20 years ago, Afib on Eliquis as of 6 months ago, HTN, HLD presented to the ED following a fall at her [facility]. CT Chest notable for right middle lobe consolidation on ED presentation.       #Mechanical Fall   -CT head negative for fracture and hemorrhage   -PT consult recommending Campos placement      #Rhabdomyolysis   -Initial -704, currently downtrending   -IVF   -serial monitoring     #Right distal radius fracture   -reduced by ortho 9/29  -acetaminophen 650mg prn for pain  -XRay wrist completed on 10/5 showing non joining of fractured ends   -Ortho intervention scheduled for 10/7    #AHRF secondary to Community Acquired PNA + fluid overload   -CT Chest showing right middle lobe focal consolidation  -on 2L NC now transitioned to room air, back on 2LNC on 10/6 for dyspnea   -Azithromycin 500mg 3 days (End date: 10/1/24)  -Ceftriaxone 1g daily 5 days (End date: 10/3/24)  -IV lasix transitioned to PO 20mg furosemide home dose       #Afib  -Telemetry monitoring   -Metoprolol succinate 25mg BID, increased to 50mg BID per Cards recommendations  -Diltiazem 240 mg daily, decreased to 120mg daily per Cards recs   -Eliquis discontinued due to recent fall    -PE work up including UA, RVP, LE Dopplers, CTA  -CTA negative for PE on 10/4, positive for bilateral pleural effusions  -LE Dopplers negative for thrombi bilaterally on 10/4      #HTN  -diltiazem 240 mg daily, per cardiology recs   -Dosing of Cardizem decreased to 120 mg daily, now decreased to 60mg daily     #HLD   -atorvastatin 20mg bedtime     DVT ppx: SCDs  Dispo: D/c to [facility] once medically stable  89 y.o female with PMHx left breast carcinoma s/p left mastectomy about 20 years ago, Afib on Eliquis as of 6 months ago, HTN, HLD presented to the ED following a fall at her [facility] home. CT Chest notable for right middle lobe consolidation on ED presentation.       #Mechanical Fall   -CT head negative for fracture and hemorrhage   -PT consult recommending Campos placement      #Rhabdomyolysis   -Initial -704, currently downtrending   -IVF   -serial monitoring     #Right distal radius fracture   -reduced by ortho 9/29  -acetaminophen 650mg prn for pain  -Tramadol 25mg daily for pain  -XRay wrist completed on 10/5 showing non joining of fractured ends   -Ortho intervention scheduled for 10/7    #AHRF secondary to Community Acquired PNA + fluid overload   -CT Chest showing right middle lobe focal consolidation  -on 2L NC now transitioned to room air, back on 2LNC on 10/6 for dyspnea   -Azithromycin 500mg 3 days (End date: 10/1/24)  -Ceftriaxone 1g daily 5 days (End date: 10/3/24)  -IV lasix transitioned to PO 20mg furosemide home dose       #Afib  -Telemetry monitoring   -Metoprolol succinate 25mg BID, increased to 50mg BID per Cards recommendations  -Diltiazem 240 mg daily, decreased to 120mg daily per Cards recs   -Eliquis discontinued due to recent fall    -PE work up including UA, RVP, LE Dopplers, CTA  -CTA negative for PE on 10/4, positive for bilateral pleural effusions  -LE Dopplers negative for thrombi bilaterally on 10/4      #HTN  -diltiazem 240 mg daily, per cardiology recs   -Dosing of Cardizem decreased to 120 mg daily, now decreased to 60mg daily     #HLD   -atorvastatin 20mg bedtime     DVT ppx: SCDs  Dispo: D/c to [facility] once medically stable

## 2024-10-07 NOTE — PROGRESS NOTE ADULT - SUBJECTIVE AND OBJECTIVE BOX
Pt Name: BRITANY GALAN    MRN: [id number]      Patient is a 89y Female examined at bedside alongside Dr Sims.  Patient states she is feeling well.  Given PO tylenol.  Splint removed, repeat reduction of right wrist performed, and patient resplinted.  Denies any other orthopedic complaints.     HEALTH ISSUES - PROBLEM Dx:  right distal radius and ulna fxs      REVIEW OF SYSTEMS    General:	No fevers or chills    Skin/Breast: +wound upper right arm, +bruising    Respiratory and Thorax: No CP. +cough  	  Cardiovascular:	No SOB    Gastrointestinal:	No abdominal pain    Musculoskeletal:	 See HPI    Neurological:	See HPI    ROS is otherwise negative.    PAST MEDICAL & SURGICAL HISTORY:  PAST MEDICAL & SURGICAL HISTORY:  Mild HTN      Chronic atrial fibrillation      HLD (hyperlipidemia)      Chronic CHF      History of left mastectomy          Allergies: No Known Allergies      Medications:     FAMILY HISTORY:  Family history of heart disease (Father)    : non-contributory    Social History:                           12.6   4.72  )-----------( 159      ( 07 Oct 2024 04:46 )             37.6   10-07    140  |  99  |  34.0[H]  ----------------------------<  90  3.7   |  28.0  |  0.86    Ca    9.2      07 Oct 2024 04:46  Phos  3.5     10-07  Mg     2.1     10-07    TPro  5.8[L]  /  Alb  3.6  /  TBili  0.8  /  DBili  x   /  AST  13  /  ALT  15  /  AlkPhos  89  10-07      PHYSICAL EXAM:    ICU Vital Signs Last 24 Hrs  T(C): 36.5 (07 Oct 2024 08:10), Max: 37 (06 Oct 2024 16:49)  T(F): 97.7 (07 Oct 2024 08:10), Max: 98.6 (06 Oct 2024 16:49)  HR: 66 (07 Oct 2024 08:10) (66 - 113)  BP: 138/84 (07 Oct 2024 08:10) (108/69 - 138/84)  BP(mean): 84 (06 Oct 2024 16:49) (84 - 84)  ABP: --  ABP(mean): --  RR: 18 (07 Oct 2024 08:10) (17 - 18)  SpO2: 93% (07 Oct 2024 08:10) (93% - 97%)    O2 Parameters below as of 07 Oct 2024 08:10  Patient On (Oxygen Delivery Method): room air              Daily Height in cm: 160.02 (29 Sep 2024 10:49)    Daily     Appearance: Alert, responsive, in no acute distress.    Neurological: Sensation is grossly intact to light touch. 5/5 motor function of all extremities. No focal deficits or weaknesses found.    Skin: no rash on visible skin. +ecchymosis. +bleeding right upper arm    Vascular: 2+ distal pulses. Cap refill < 2 sec. No signs of venous insuffiency or stasis. No extremity ulcerations. No cyanosis.    Musculoskeletal:    splint removed       Right Upper Extremity: +deformity wrist. +ecchymosis wrist and upper arm. Skin intact at wrist/forearm.  small skin tear on dorsal aspect of distal wrist. +mild swelling wrist. +TTP wrist. Elbow/shoulder NTTP. +ROM shoulder/elbow/fingers. Radial pulse 2+. Sensation intact. Brisk cap refill.    xeroform applied to small skin tear.      Imaging Studies:      A/P:  Pt is a  89y Female with right dorsally angulated distal radius fx    FRACTURE REDUCTION  PROCEDURE NOTE: Fracture reduction     Performed by:  Dr Sims/Rohit Horne PA-C    Indication: Acute fracture with displacement, requiring fracture reduction.    Consent: The risks and benefits of the procedure including incomplete reduction, nerve damage and bleeding were explained and the patient verbalized their understanding and wished to proceed with the procedure. Written consent was obtained following the discussion.    Universal Protocol: a time out was performed and the correct patient and site were verified     Procedure: Neurovascular exam intact prior to fracture reduction.  Skin exam : No bleeding or lacerations at the fracture site. Anesthesia/pain control, using aseptic technique, was administered using a hematoma block of 10 ml of 1% lidocaine. Reduction of the right wrist was accomplished via axial traction and careful manipulation. Following adequate reduction and alignment of the fractured bone, the fracture was immobilized with a  plaster splint. Distally, the extremity was neurovascular intact following the procedure.  The patient tolerated the procedure well.    Post reduction films obtained ordered.    Complications: None    PLAN:   -consent obtained for closed reduction  -hematoma block 10cc 1% lidocaine given after sterile prep with chloroprep  -closed reduction right distal radius- see procedure note  -sugar tong splint applied   -splint care  -NWB RUE  -pain control  -sling for comfort  - Post reduction films ordered   - Follow up with Dr Sims in his office   - Case discussed with Catr[place]

## 2024-10-07 NOTE — DIETITIAN INITIAL EVALUATION ADULT - NS FNS DIET ORDER
Diet, NPO:   NPO for Procedure/Test     NPO Start Date: 07-Oct-2024,   NPO Start Time: 08:40  Except Medications (10-07-24 @ 08:38)

## 2024-10-07 NOTE — PROGRESS NOTE ADULT - SUBJECTIVE AND OBJECTIVE BOX
HPI  89 y.o female with PMHx left breast carcinoma s/p left mastectomy about 20 years ago, Afib on Eliquis as of 6 months ago, HTN, HLD presented to the ED following a fall at her [facility] home, where she fell onto her right side and had been laying on the ground for 4 hours before being found. The patient has been living at the nursing home for the past 2 years. She denies loss of consciousness and ED trauma scan was notable for a right distal radius fracture s/p reduction done by ortho service. CT chest in ED demonstrated a right middle lobe consolidation for which patient was placed on 3 day course of azithromycin and 5 day course of ceftriaxone.     INTERVAL EVENTS  Patient continues to endorse difficulty breathing. She was put back on 2LNC and has been out of bed to chair.     HOSPITAL COURSE  Patient presented to the ED on 9/29 following a fall with labs concerning for rhabdomyolysis. Trauma scan was positive for fractured distal radius that was reduced by Ortho on 9/29. CT Chest demonstrated right middle lobe consolidation for which patient was placed on antibiotics. Her telemetry monitoring alarmed on 9/29 following an episode of hypotension and bradycardia with Afib converted to Aflutter responded to with IVF. On 10/2, Pt was seen by cardiology following her telemetry alarm on 9/29, recommending discontinue of Eliquis following her fall in addition to furosemide 20mg PO every other day. In the morning, pt was tachycardic with rates in the 130s. Patient received her scheduled Cardizem and metoprolol. Patient was in sustained Afib with RVR to 130s on 10/4. Primary team pursued PE workup on 10/4 with CTA showing negative PE but bilateral pleural effusions. LE dopplers negative for DVT. Patient weaned to room air on 10/5. Patient reports difficulty breathing on 10/6 and was placed on 2LNC.       REVIEW OF SYSTEMS   General: No fatigue, decreased apatite, weight loss  HEENT: No cough, throat pain, rhinorrhea hemoptysis    Chest: No shortness of breath, chest pain  Abdomen: No abdominal pain, hematemesis   Genitourinary: No dysuria, No hematuria   Extremities: No joint pain, no change in range of motion  Skin: No rashes, ecchymoses purpura, nodules       MEDICATIONS  MEDICATIONS  (STANDING):  atorvastatin 20 milliGRAM(s) Oral at bedtime  cyanocobalamin 1000 MICROGram(s) Oral daily  diltiazem    Tablet 60 milliGRAM(s) Oral every 8 hours  enoxaparin Injectable 40 milliGRAM(s) SubCutaneous every 24 hours  furosemide    Tablet 20 milliGRAM(s) Oral <User Schedule>  influenza  Vaccine (HIGH DOSE) 0.5 milliLiter(s) IntraMuscular once  lactated ringers. 1000 milliLiter(s) (100 mL/Hr) IV Continuous <Continuous>  metoprolol succinate ER 50 milliGRAM(s) Oral two times a day  polyethylene glycol 3350 17 Gram(s) Oral two times a day  senna 2 Tablet(s) Oral at bedtime  traMADol 25 milliGRAM(s) Oral daily    MEDICATIONS  (PRN):  acetaminophen     Tablet .. 650 milliGRAM(s) Oral every 6 hours PRN Moderate Pain (4 - 6)      ALLERGIES  Allergies    No Known Allergies    Intolerances        PHYSICAL EXAM   Vital Signs Last 24 Hrs  T(C): 36.5 (07 Oct 2024 08:10), Max: 37 (06 Oct 2024 16:49)  T(F): 97.7 (07 Oct 2024 08:10), Max: 98.6 (06 Oct 2024 16:49)  HR: 66 (07 Oct 2024 08:10) (66 - 113)  BP: 138/84 (07 Oct 2024 08:10) (108/69 - 138/84)  BP(mean): 84 (06 Oct 2024 16:49) (84 - 84)  RR: 18 (07 Oct 2024 08:10) (17 - 18)  SpO2: 93% (07 Oct 2024 08:10) (93% - 97%)    Parameters below as of 07 Oct 2024 08:10  Patient On (Oxygen Delivery Method): room air        T(C): 36.5 (10-07-24 @ 08:10), Max: 37 (10-06-24 @ 16:49)  HR: 66 (10-07-24 @ 08:10) (66 - 113)  BP: 138/84 (10-07-24 @ 08:10) (108/69 - 138/84)  RR: 18 (10-07-24 @ 08:10) (17 - 18)  SpO2: 93% (10-07-24 @ 08:10) (93% - 97%)    CONSTITUTIONAL: Well groomed, no apparent distress  EYES: PERRLA and symmetric, EOMI, No conjunctival or scleral injection, non-icteric  ENMT: Oral mucosa with moist membranes. Normal dentition; no pharyngeal injection or exudates             NECK: Supple, symmetric and without tracheal deviation   RESP: No respiratory distress, no use of accessory muscles; CTA b/l, no WRR  CV: RRR, +S1S2, no MRG; no JVD; no peripheral edema  GI: Soft, NT, ND, no rebound, no guarding; no palpable masses; no hepatosplenomegaly; no hernia palpated  LYMPH: No cervical LAD or tenderness; no axillary LAD or tenderness; no inguinal LAD or tenderness  MSK: Normal gait; No digital clubbing or cyanosis; examination of the (head/neck/spine/ribs/pelvis, RUE, LUE, RLE, LLE) without misalignment,            Normal ROM without pain, no spinal tenderness, normal muscle strength/tone  SKIN: No rashes or ulcers noted; no subcutaneous nodules or induration palpable  NEURO: CN II-XII intact; normal reflexes in upper and lower extremities, sensation intact in upper and lower extremities b/l to light touch   PSYCH: Appropriate insight/judgment; A+O x 3, mood and affect appropriate, recent/remote memory intact      LABS                        12.6   4.72  )-----------( 159      ( 07 Oct 2024 04:46 )             37.6       CBC Full  -  ( 07 Oct 2024 04:46 )  WBC Count : 4.72 K/uL  RBC Count : 4.42 M/uL  Hemoglobin : 12.6 g/dL  Hematocrit : 37.6 %  Platelet Count - Automated : 159 K/uL  Mean Cell Volume : 85.1 fl  Mean Cell Hemoglobin : 28.5 pg  Mean Cell Hemoglobin Concentration : 33.5 gm/dL  Auto Neutrophil # : x  Auto Lymphocyte # : x  Auto Monocyte # : x  Auto Eosinophil # : x  Auto Basophil # : x  Auto Neutrophil % : x  Auto Lymphocyte % : x  Auto Monocyte % : x  Auto Eosinophil % : x  Auto Basophil % : x      10-07    140  |  99  |  34.0[H]  ----------------------------<  90  3.7   |  28.0  |  0.86    Ca    9.2      07 Oct 2024 04:46  Phos  3.5     10-07  Mg     2.1     10-07    TPro  5.8[L]  /  Alb  3.6  /  TBili  0.8  /  DBili  x   /  AST  13  /  ALT  15  /  AlkPhos  89  10-07      CAPILLARY BLOOD GLUCOSE              Urinalysis Basic - ( 07 Oct 2024 04:46 )    Color: x / Appearance: x / SG: x / pH: x  Gluc: 90 mg/dL / Ketone: x  / Bili: x / Urobili: x   Blood: x / Protein: x / Nitrite: x   Leuk Esterase: x / RBC: x / WBC x   Sq Epi: x / Non Sq Epi: x / Bacteria: x        IMAGING          HPI  89 y.o female with PMHx left breast carcinoma s/p left mastectomy about 20 years ago, Afib on Eliquis as of 6 months ago, HTN, HLD presented to the ED following a fall at her [facility], where she fell onto her right side and had been laying on the ground for 4 hours before being found. The patient has been living at the nursing home for the past 2 years. She denies loss of consciousness and ED trauma scan was notable for a right distal radius fracture s/p reduction done by ortho service. CT chest in ED demonstrated a right middle lobe consolidation for which patient was placed on 3 day course of azithromycin and 5 day course of ceftriaxone.     INTERVAL EVENTS  Patient back on room air with no reports of difficulty breathing. Patient endorsing right wrist fracture pain. Tramadol 25mg standing was added to her regimen. Patient scheduled for closed reduction of right wrist malalignment 10/7 with local anesthesia     HOSPITAL COURSE  Patient presented to the ED on 9/29 following a fall with labs concerning for rhabdomyolysis. Trauma scan was positive for fractured distal radius that was reduced by Ortho on 9/29. CT Chest demonstrated right middle lobe consolidation for which patient was placed on antibiotics. Her telemetry monitoring alarmed on 9/29 following an episode of hypotension and bradycardia with Afib converted to Aflutter responded to with IVF. On 10/2, Pt was seen by cardiology following her telemetry alarm on 9/29, recommending discontinue of Eliquis following her fall in addition to furosemide 20mg PO every other day. In the morning, pt was tachycardic with rates in the 130s. Patient received her scheduled Cardizem and metoprolol. Patient was in sustained Afib with RVR to 130s on 10/4. Primary team pursued PE workup on 10/4 with CTA showing negative PE but bilateral pleural effusions. LE dopplers negative for DVT. Patient weaned to room air on 10/5. Patient reports difficulty breathing on 10/6 and was placed on 2LNC.       REVIEW OF SYSTEMS Patient endorsing right wrist pain.   General: No fatigue, decreased apatite, weight loss  HEENT: No cough, throat pain, rhinorrhea hemoptysis    Chest: No shortness of breath, chest pain  Abdomen: No abdominal pain, hematemesis   Genitourinary: No dysuria, No hematuria   Extremities: RUE pain, no change in range of motion  Skin: No rashes, ecchymoses purpura, nodules       MEDICATIONS  MEDICATIONS  (STANDING):  atorvastatin 20 milliGRAM(s) Oral at bedtime  cyanocobalamin 1000 MICROGram(s) Oral daily  diltiazem    Tablet 60 milliGRAM(s) Oral every 8 hours  enoxaparin Injectable 40 milliGRAM(s) SubCutaneous every 24 hours  furosemide    Tablet 20 milliGRAM(s) Oral <User Schedule>  influenza  Vaccine (HIGH DOSE) 0.5 milliLiter(s) IntraMuscular once  lactated ringers. 1000 milliLiter(s) (100 mL/Hr) IV Continuous <Continuous>  metoprolol succinate ER 50 milliGRAM(s) Oral two times a day  polyethylene glycol 3350 17 Gram(s) Oral two times a day  senna 2 Tablet(s) Oral at bedtime  traMADol 25 milliGRAM(s) Oral daily    MEDICATIONS  (PRN):  acetaminophen     Tablet .. 650 milliGRAM(s) Oral every 6 hours PRN Moderate Pain (4 - 6)      ALLERGIES  Allergies    No Known Allergies    Intolerances        PHYSICAL EXAM   Vital Signs Last 24 Hrs  T(C): 36.5 (07 Oct 2024 08:10), Max: 37 (06 Oct 2024 16:49)  T(F): 97.7 (07 Oct 2024 08:10), Max: 98.6 (06 Oct 2024 16:49)  HR: 66 (07 Oct 2024 08:10) (66 - 113)  BP: 138/84 (07 Oct 2024 08:10) (108/69 - 138/84)  BP(mean): 84 (06 Oct 2024 16:49) (84 - 84)  RR: 18 (07 Oct 2024 08:10) (17 - 18)  SpO2: 93% (07 Oct 2024 08:10) (93% - 97%)    Parameters below as of 07 Oct 2024 08:10  Patient On (Oxygen Delivery Method): room air        T(C): 36.5 (10-07-24 @ 08:10), Max: 37 (10-06-24 @ 16:49)  HR: 66 (10-07-24 @ 08:10) (66 - 113)  BP: 138/84 (10-07-24 @ 08:10) (108/69 - 138/84)  RR: 18 (10-07-24 @ 08:10) (17 - 18)  SpO2: 93% (10-07-24 @ 08:10) (93% - 97%)    CONSTITUTIONAL: Well groomed, no apparent distress  EYES: PERRLA and symmetric, EOMI, No conjunctival or scleral injection, non-icteric  ENMT: Oral mucosa with moist membranes. Normal dentition  NECK: without tracheal deviation   RESP: No respiratory distress, no use of accessory muscles; CTA b/l, no WRR  CV: RRR, +S1S2, no MRG; no JVD; no peripheral edema  GI: Soft, NT, ND, no rebound, no guarding  MSK: RUE in cast and ACE wraps  SKIN: No rashes or ulcers noted  NEURO: sensation intact in upper and lower extremities b/l to light touch   PSYCH: Appropriate insight/judgment; A+O x 4, mood and affect appropriate, recent/remote memory intact      LABS                        12.6   4.72  )-----------( 159      ( 07 Oct 2024 04:46 )             37.6       CBC Full  -  ( 07 Oct 2024 04:46 )  WBC Count : 4.72 K/uL  RBC Count : 4.42 M/uL  Hemoglobin : 12.6 g/dL  Hematocrit : 37.6 %  Platelet Count - Automated : 159 K/uL  Mean Cell Volume : 85.1 fl  Mean Cell Hemoglobin : 28.5 pg  Mean Cell Hemoglobin Concentration : 33.5 gm/dL  Auto Neutrophil # : x  Auto Lymphocyte # : x  Auto Monocyte # : x  Auto Eosinophil # : x  Auto Basophil # : x  Auto Neutrophil % : x  Auto Lymphocyte % : x  Auto Monocyte % : x  Auto Eosinophil % : x  Auto Basophil % : x      10-07    140  |  99  |  34.0[H]  ----------------------------<  90  3.7   |  28.0  |  0.86    Ca    9.2      07 Oct 2024 04:46  Phos  3.5     10-07  Mg     2.1     10-07    TPro  5.8[L]  /  Alb  3.6  /  TBili  0.8  /  DBili  x   /  AST  13  /  ALT  15  /  AlkPhos  89  10-07      CAPILLARY BLOOD GLUCOSE              Urinalysis Basic - ( 07 Oct 2024 04:46 )    Color: x / Appearance: x / SG: x / pH: x  Gluc: 90 mg/dL / Ketone: x  / Bili: x / Urobili: x   Blood: x / Protein: x / Nitrite: x   Leuk Esterase: x / RBC: x / WBC x   Sq Epi: x / Non Sq Epi: x / Bacteria: x        IMAGING

## 2024-10-07 NOTE — DIETITIAN INITIAL EVALUATION ADULT - OTHER INFO
89 y.o female with PMHx left breast carcinoma s/p left mastectomy about 20 years ago, Afib on Eliquis as of 6 months ago, HTN, HLD presented to the ED following a fall at her [facility] home, where she fell onto her right side and had been laying on the ground for 4 hours before being found. The patient has been living at the nursing home for the past 2 years. She denies loss of consciousness and ED trauma scan was notable for a right distal radius fracture s/p reduction done by ortho service. CT chest in ED demonstrated a right middle lobe consolidation for which patient was placed on 3 day course of azithromycin and 5 day course of ceftriaxone.

## 2024-10-08 VITALS
OXYGEN SATURATION: 94 % | SYSTOLIC BLOOD PRESSURE: 109 MMHG | TEMPERATURE: 98 F | RESPIRATION RATE: 18 BRPM | HEART RATE: 91 BPM | DIASTOLIC BLOOD PRESSURE: 70 MMHG

## 2024-10-08 LAB
ALBUMIN SERPL ELPH-MCNC: 3.8 G/DL — SIGNIFICANT CHANGE UP (ref 3.3–5.2)
ALP SERPL-CCNC: 89 U/L — SIGNIFICANT CHANGE UP (ref 40–120)
ALT FLD-CCNC: 16 U/L — SIGNIFICANT CHANGE UP
ANION GAP SERPL CALC-SCNC: 13 MMOL/L — SIGNIFICANT CHANGE UP (ref 5–17)
AST SERPL-CCNC: 15 U/L — SIGNIFICANT CHANGE UP
BILIRUB SERPL-MCNC: 0.7 MG/DL — SIGNIFICANT CHANGE UP (ref 0.4–2)
BUN SERPL-MCNC: 35.2 MG/DL — HIGH (ref 8–20)
CALCIUM SERPL-MCNC: 8.9 MG/DL — SIGNIFICANT CHANGE UP (ref 8.4–10.5)
CHLORIDE SERPL-SCNC: 101 MMOL/L — SIGNIFICANT CHANGE UP (ref 96–108)
CO2 SERPL-SCNC: 27 MMOL/L — SIGNIFICANT CHANGE UP (ref 22–29)
CREAT SERPL-MCNC: 0.82 MG/DL — SIGNIFICANT CHANGE UP (ref 0.5–1.3)
EGFR: 67 ML/MIN/1.73M2 — SIGNIFICANT CHANGE UP
GLUCOSE SERPL-MCNC: 100 MG/DL — HIGH (ref 70–99)
HCT VFR BLD CALC: 37.9 % — SIGNIFICANT CHANGE UP (ref 34.5–45)
HGB BLD-MCNC: 12.5 G/DL — SIGNIFICANT CHANGE UP (ref 11.5–15.5)
MAGNESIUM SERPL-MCNC: 2.1 MG/DL — SIGNIFICANT CHANGE UP (ref 1.6–2.6)
MCHC RBC-ENTMCNC: 28 PG — SIGNIFICANT CHANGE UP (ref 27–34)
MCHC RBC-ENTMCNC: 33 GM/DL — SIGNIFICANT CHANGE UP (ref 32–36)
MCV RBC AUTO: 84.8 FL — SIGNIFICANT CHANGE UP (ref 80–100)
PHOSPHATE SERPL-MCNC: 4.2 MG/DL — SIGNIFICANT CHANGE UP (ref 2.4–4.7)
PLATELET # BLD AUTO: 152 K/UL — SIGNIFICANT CHANGE UP (ref 150–400)
POTASSIUM SERPL-MCNC: 3.4 MMOL/L — LOW (ref 3.5–5.3)
POTASSIUM SERPL-SCNC: 3.4 MMOL/L — LOW (ref 3.5–5.3)
PROT SERPL-MCNC: 6 G/DL — LOW (ref 6.6–8.7)
RBC # BLD: 4.47 M/UL — SIGNIFICANT CHANGE UP (ref 3.8–5.2)
RBC # FLD: 14.4 % — SIGNIFICANT CHANGE UP (ref 10.3–14.5)
SODIUM SERPL-SCNC: 141 MMOL/L — SIGNIFICANT CHANGE UP (ref 135–145)
WBC # BLD: 4.28 K/UL — SIGNIFICANT CHANGE UP (ref 3.8–10.5)
WBC # FLD AUTO: 4.28 K/UL — SIGNIFICANT CHANGE UP (ref 3.8–10.5)

## 2024-10-08 PROCEDURE — 73090 X-RAY EXAM OF FOREARM: CPT

## 2024-10-08 PROCEDURE — 0225U NFCT DS DNA&RNA 21 SARSCOV2: CPT

## 2024-10-08 PROCEDURE — 85027 COMPLETE CBC AUTOMATED: CPT

## 2024-10-08 PROCEDURE — 80053 COMPREHEN METABOLIC PANEL: CPT

## 2024-10-08 PROCEDURE — 82553 CREATINE MB FRACTION: CPT

## 2024-10-08 PROCEDURE — 96374 THER/PROPH/DIAG INJ IV PUSH: CPT

## 2024-10-08 PROCEDURE — 82550 ASSAY OF CK (CPK): CPT

## 2024-10-08 PROCEDURE — 85730 THROMBOPLASTIN TIME PARTIAL: CPT

## 2024-10-08 PROCEDURE — 86850 RBC ANTIBODY SCREEN: CPT

## 2024-10-08 PROCEDURE — 90715 TDAP VACCINE 7 YRS/> IM: CPT

## 2024-10-08 PROCEDURE — 96375 TX/PRO/DX INJ NEW DRUG ADDON: CPT

## 2024-10-08 PROCEDURE — 72125 CT NECK SPINE W/O DYE: CPT | Mod: MC

## 2024-10-08 PROCEDURE — 70450 CT HEAD/BRAIN W/O DYE: CPT | Mod: MC

## 2024-10-08 PROCEDURE — 90471 IMMUNIZATION ADMIN: CPT

## 2024-10-08 PROCEDURE — 84100 ASSAY OF PHOSPHORUS: CPT

## 2024-10-08 PROCEDURE — 74177 CT ABD & PELVIS W/CONTRAST: CPT | Mod: MC

## 2024-10-08 PROCEDURE — 93005 ELECTROCARDIOGRAM TRACING: CPT

## 2024-10-08 PROCEDURE — 83735 ASSAY OF MAGNESIUM: CPT

## 2024-10-08 PROCEDURE — 82962 GLUCOSE BLOOD TEST: CPT

## 2024-10-08 PROCEDURE — 80048 BASIC METABOLIC PNL TOTAL CA: CPT

## 2024-10-08 PROCEDURE — 97530 THERAPEUTIC ACTIVITIES: CPT

## 2024-10-08 PROCEDURE — 71275 CT ANGIOGRAPHY CHEST: CPT | Mod: MC

## 2024-10-08 PROCEDURE — 85025 COMPLETE CBC W/AUTO DIFF WBC: CPT

## 2024-10-08 PROCEDURE — 85610 PROTHROMBIN TIME: CPT

## 2024-10-08 PROCEDURE — 73110 X-RAY EXAM OF WRIST: CPT

## 2024-10-08 PROCEDURE — 93970 EXTREMITY STUDY: CPT

## 2024-10-08 PROCEDURE — 86901 BLOOD TYPING SEROLOGIC RH(D): CPT

## 2024-10-08 PROCEDURE — 97110 THERAPEUTIC EXERCISES: CPT

## 2024-10-08 PROCEDURE — 83880 ASSAY OF NATRIURETIC PEPTIDE: CPT

## 2024-10-08 PROCEDURE — 93306 TTE W/DOPPLER COMPLETE: CPT

## 2024-10-08 PROCEDURE — 99285 EMERGENCY DEPT VISIT HI MDM: CPT | Mod: 25

## 2024-10-08 PROCEDURE — 74018 RADEX ABDOMEN 1 VIEW: CPT

## 2024-10-08 PROCEDURE — 36415 COLL VENOUS BLD VENIPUNCTURE: CPT

## 2024-10-08 PROCEDURE — 97116 GAIT TRAINING THERAPY: CPT

## 2024-10-08 PROCEDURE — 81003 URINALYSIS AUTO W/O SCOPE: CPT

## 2024-10-08 PROCEDURE — 99239 HOSP IP/OBS DSCHRG MGMT >30: CPT | Mod: GC

## 2024-10-08 PROCEDURE — 71260 CT THORAX DX C+: CPT | Mod: MC

## 2024-10-08 PROCEDURE — 71045 X-RAY EXAM CHEST 1 VIEW: CPT

## 2024-10-08 PROCEDURE — 86900 BLOOD TYPING SEROLOGIC ABO: CPT

## 2024-10-08 RX ORDER — DILTIAZEM HCL 300 MG
1 CAPSULE, EXTENDED RELEASE 24HR ORAL
Qty: 0 | Refills: 0 | DISCHARGE
Start: 2024-10-08

## 2024-10-08 RX ORDER — ACETAMINOPHEN 325 MG
2 TABLET ORAL
Qty: 0 | Refills: 0 | DISCHARGE
Start: 2024-10-08

## 2024-10-08 RX ORDER — FUROSEMIDE 10 MG/ML
1 INJECTION INTRAVENOUS
Qty: 0 | Refills: 0 | DISCHARGE
Start: 2024-10-08

## 2024-10-08 RX ORDER — TRAMADOL HYDROCHLORIDE 50 MG/1
0.5 TABLET, COATED ORAL
Qty: 0 | Refills: 0 | DISCHARGE
Start: 2024-10-08

## 2024-10-08 RX ORDER — SENNOSIDES 8.6 MG
2 TABLET ORAL
Qty: 0 | Refills: 0 | DISCHARGE
Start: 2024-10-08

## 2024-10-08 RX ADMIN — ENOXAPARIN SODIUM 40 MILLIGRAM(S): 150 INJECTION SUBCUTANEOUS at 13:11

## 2024-10-08 RX ADMIN — FUROSEMIDE 20 MILLIGRAM(S): 10 INJECTION INTRAVENOUS at 05:16

## 2024-10-08 RX ADMIN — Medication 60 MILLIGRAM(S): at 13:12

## 2024-10-08 RX ADMIN — Medication 17 GRAM(S): at 05:15

## 2024-10-08 RX ADMIN — Medication 17 GRAM(S): at 17:20

## 2024-10-08 RX ADMIN — Medication 40 MILLIEQUIVALENT(S): at 13:11

## 2024-10-08 RX ADMIN — Medication 50 MILLIGRAM(S): at 17:19

## 2024-10-08 RX ADMIN — Medication 650 MILLIGRAM(S): at 17:19

## 2024-10-08 RX ADMIN — Medication 650 MILLIGRAM(S): at 13:12

## 2024-10-08 RX ADMIN — Medication 50 MILLIGRAM(S): at 05:15

## 2024-10-08 RX ADMIN — Medication 1000 MICROGRAM(S): at 13:12

## 2024-10-08 RX ADMIN — Medication 60 MILLIGRAM(S): at 05:15

## 2024-10-08 RX ADMIN — Medication 650 MILLIGRAM(S): at 13:13

## 2024-10-08 RX ADMIN — Medication 650 MILLIGRAM(S): at 05:16

## 2024-10-08 NOTE — PROGRESS NOTE ADULT - ATTENDING COMMENTS
89 y/o F with PMH of Afib on Eliquis, HTN, HLD admitted after mechanical fall round to have rhabdomyolysis and wrist fracture.   s/p reduction by Orthopedic surgery   Cardiology following  Continue IV Lasix  Telemetry monitoring, still in Afib with RVR  Continue with Cardizem 120mg daily and Toprol XL 50mg daily
89 y/o F with PMH of Afib on Eliquis, HTN, HLD admitted after mechanical fall round to have rhabdomyolysis and wrist fracture.   s/p reduction by Orthopedic surgery   Pending auth for [facility]
89 y/o F with PMH of Afib on Eliquis, HTN, HLD admitted after mechanical fall round to have rhabdomyolysis and wrist fracture.   s/p reduction by Orthopedic surgery   This morning patient is in Afib with RVR  Cardiology consulted and patient started on Cardizem 240mg daily and Toprol XL 25mg BID  Start Lasix 20mg every other day     DC pending improvement in HR
Agree with above   Patient seen and examined together with Dr. Westbrook PGY1. No complains   Vital signs,  labs and imaging  reviewed. HR is not controlled.   Assessment and plan discussed with pt and Dr. Westbrook. Medications adjusted.   Awaiting input from ortho team regarding rt wrist fracture
Agree with above   Patient seen and examined together with medical residents. Only c/o wrist pain.   Vital signs,  labs and imaging  reviewed. Post reduction Xray done. Discussed results with ortho team. No further interventions. Recommended to follow as outpatient   Assessment and plan as above and discussed with patient and residents   Stable for discharge to [facility]
Agree with above   Patient seen and examined together with medical residents prior to have repeat reduction done. Had no complains except for arm pain   Vital signs,  labs and imaging  reviewed   Assessment and plan discussed with patient.   She is s/p reduction. pending repeat Xray.   Ordered Tylenol q6 standing order and Tramadol 25 mg PO q8 PRN   Discharge planning to [facility]

## 2024-10-08 NOTE — PROGRESS NOTE ADULT - SUBJECTIVE AND OBJECTIVE BOX
HPI  89 y.o female with PMHx left breast carcinoma s/p left mastectomy about 20 years ago, Afib on Eliquis as of 6 months ago, HTN, HLD presented to the ED following a fall at her [facility], where she fell onto her right side and had been laying on the ground for 4 hours before being found. The patient has been living at the nursing home for the past 2 years. She denies loss of consciousness and ED trauma scan was notable for a right distal radius fracture s/p reduction done by ortho service. CT chest in ED demonstrated a right middle lobe consolidation for which patient was placed on 3 day course of azithromycin and 5 day course of ceftriaxone.     INTERVAL EVENTS  Patient back on room air with no reports of difficulty breathing. Patient endorsing right wrist fracture pain. Tramadol 25mg standing was added to her regimen. Patient scheduled for closed reduction of right wrist malalignment 10/7 with local anesthesia     HOSPITAL COURSE  Patient presented to the ED on 9/29 following a fall with labs concerning for rhabdomyolysis. Trauma scan was positive for fractured distal radius that was reduced by Ortho on 9/29. CT Chest demonstrated right middle lobe consolidation for which patient was placed on antibiotics. Her telemetry monitoring alarmed on 9/29 following an episode of hypotension and bradycardia with Afib converted to Aflutter responded to with IVF. On 10/2, Pt was seen by cardiology following her telemetry alarm on 9/29, recommending discontinue of Eliquis following her fall in addition to furosemide 20mg PO every other day. In the morning, pt was tachycardic with rates in the 130s. Patient received her scheduled Cardizem and metoprolol. Patient was in sustained Afib with RVR to 130s on 10/4. Primary team pursued PE workup on 10/4 with CTA showing negative PE but bilateral pleural effusions. LE dopplers negative for DVT. Patient weaned to room air on 10/5. Patient reports difficulty breathing on 10/6 and was placed on 2LNC.       REVIEW OF SYSTEMS   General: No fatigue, decreased apatite, weight loss  HEENT: No cough, throat pain, rhinorrhea hemoptysis    Chest: No shortness of breath, chest pain  Abdomen: No abdominal pain, hematemesis   Genitourinary: No dysuria, No hematuria   Extremities: No joint pain, no change in range of motion  Skin: No rashes, ecchymoses purpura, nodules       MEDICATIONS  MEDICATIONS  (STANDING):  acetaminophen     Tablet .. 650 milliGRAM(s) Oral every 6 hours  atorvastatin 20 milliGRAM(s) Oral at bedtime  cyanocobalamin 1000 MICROGram(s) Oral daily  diltiazem    Tablet 60 milliGRAM(s) Oral every 8 hours  enoxaparin Injectable 40 milliGRAM(s) SubCutaneous every 24 hours  furosemide    Tablet 20 milliGRAM(s) Oral <User Schedule>  influenza  Vaccine (HIGH DOSE) 0.5 milliLiter(s) IntraMuscular once  lactated ringers. 1000 milliLiter(s) (100 mL/Hr) IV Continuous <Continuous>  lidocaine 1% Injectable 15 milliLiter(s) Local Injection once  metoprolol succinate ER 50 milliGRAM(s) Oral two times a day  polyethylene glycol 3350 17 Gram(s) Oral two times a day  potassium chloride    Tablet ER 40 milliEquivalent(s) Oral once  senna 2 Tablet(s) Oral at bedtime    MEDICATIONS  (PRN):  traMADol 25 milliGRAM(s) Oral every 8 hours PRN Severe Pain (7 - 10)      ALLERGIES  Allergies    No Known Allergies    Intolerances        PHYSICAL EXAM   Vital Signs Last 24 Hrs  T(C): 36.4 (08 Oct 2024 04:39), Max: 36.7 (07 Oct 2024 16:45)  T(F): 97.6 (08 Oct 2024 04:39), Max: 98 (07 Oct 2024 16:45)  HR: 60 (08 Oct 2024 04:39) (60 - 68)  BP: 138/80 (08 Oct 2024 04:39) (123/69 - 138/80)  BP(mean): --  RR: 18 (08 Oct 2024 04:39) (18 - 18)  SpO2: 96% (08 Oct 2024 04:39) (95% - 97%)    Parameters below as of 08 Oct 2024 04:39  Patient On (Oxygen Delivery Method): room air        T(C): 36.4 (10-08-24 @ 04:39), Max: 36.7 (10-07-24 @ 16:45)  HR: 60 (10-08-24 @ 04:39) (60 - 68)  BP: 138/80 (10-08-24 @ 04:39) (123/69 - 138/80)  RR: 18 (10-08-24 @ 04:39) (18 - 18)  SpO2: 96% (10-08-24 @ 04:39) (95% - 97%)    CONSTITUTIONAL: Well groomed, no apparent distress  EYES: PERRLA and symmetric, EOMI, No conjunctival or scleral injection, non-icteric  ENMT: Oral mucosa with moist membranes. Normal dentition; no pharyngeal injection or exudates             NECK: Supple, symmetric and without tracheal deviation   RESP: No respiratory distress, no use of accessory muscles; CTA b/l, no WRR  CV: RRR, +S1S2, no MRG; no JVD; no peripheral edema  GI: Soft, NT, ND, no rebound, no guarding; no palpable masses; no hepatosplenomegaly; no hernia palpated  LYMPH: No cervical LAD or tenderness; no axillary LAD or tenderness; no inguinal LAD or tenderness  MSK: Normal gait; No digital clubbing or cyanosis; examination of the (head/neck/spine/ribs/pelvis, RUE, LUE, RLE, LLE) without misalignment,            Normal ROM without pain, no spinal tenderness, normal muscle strength/tone  SKIN: No rashes or ulcers noted; no subcutaneous nodules or induration palpable  NEURO: CN II-XII intact; normal reflexes in upper and lower extremities, sensation intact in upper and lower extremities b/l to light touch   PSYCH: Appropriate insight/judgment; A+O x 3, mood and affect appropriate, recent/remote memory intact      LABS                        12.5   4.28  )-----------( 152      ( 08 Oct 2024 05:44 )             37.9       CBC Full  -  ( 08 Oct 2024 05:44 )  WBC Count : 4.28 K/uL  RBC Count : 4.47 M/uL  Hemoglobin : 12.5 g/dL  Hematocrit : 37.9 %  Platelet Count - Automated : 152 K/uL  Mean Cell Volume : 84.8 fl  Mean Cell Hemoglobin : 28.0 pg  Mean Cell Hemoglobin Concentration : 33.0 gm/dL  Auto Neutrophil # : x  Auto Lymphocyte # : x  Auto Monocyte # : x  Auto Eosinophil # : x  Auto Basophil # : x  Auto Neutrophil % : x  Auto Lymphocyte % : x  Auto Monocyte % : x  Auto Eosinophil % : x  Auto Basophil % : x      10-08    141  |  101  |  35.2[H]  ----------------------------<  100[H]  3.4[L]   |  27.0  |  0.82    Ca    8.9      08 Oct 2024 05:44  Phos  4.2     10-08  Mg     2.1     10-08    TPro  6.0[L]  /  Alb  3.8  /  TBili  0.7  /  DBili  x   /  AST  15  /  ALT  16  /  AlkPhos  89  10-08      CAPILLARY BLOOD GLUCOSE              Urinalysis Basic - ( 08 Oct 2024 05:44 )    Color: x / Appearance: x / SG: x / pH: x  Gluc: 100 mg/dL / Ketone: x  / Bili: x / Urobili: x   Blood: x / Protein: x / Nitrite: x   Leuk Esterase: x / RBC: x / WBC x   Sq Epi: x / Non Sq Epi: x / Bacteria: x        IMAGING     < from: Xray Wrist 3 Views, Right (10.07.24 @ 15:30) >  ACC: [id number] EXAM:  XR WRIST COMP MIN 3 VIEWS RT      PROCEDURE DATE:  10/07/2024          INTERPRETATION:  Right wrist. 3 views. Prior imaging showed fractures of   the distal right radius and ulna. Patient was subsequently splendid.    On present examination splinting again noted. Alignment is unchanged from   October 5.    There is significant degeneration of the base of the first metacarpal   again seen.    IMPRESSION: Unchanged alignment.    --- End of Report ---    < end of copied text >       HPI  89 y.o female with PMHx left breast carcinoma s/p left mastectomy about 20 years ago, Afib on Eliquis as of 6 months ago, HTN, HLD presented to the ED following a fall at her [facility] home, where she fell onto her right side and had been laying on the ground for 4 hours before being found. The patient has been living at the nursing home for the past 2 years. She denies loss of consciousness and ED trauma scan was notable for a right distal radius fracture s/p reduction done by ortho service. CT chest in ED demonstrated a right middle lobe consolidation for which patient was placed on 3 day course of azithromycin and 5 day course of ceftriaxone.     INTERVAL EVENTS  Patient back on room air with no reports of difficulty breathing. Patient endorsing right wrist fracture pain. Tramadol 25mg Q8 was added to her regimen.    HOSPITAL COURSE  Patient presented to the ED on 9/29 following a fall with labs concerning for rhabdomyolysis. Trauma scan was positive for fractured distal radius that was reduced by Ortho on 9/29. CT Chest demonstrated right middle lobe consolidation for which patient was placed on antibiotics. Her telemetry monitoring alarmed on 9/29 following an episode of hypotension and bradycardia with Afib converted to Aflutter responded to with IVF. On 10/2, Pt was seen by cardiology following her telemetry alarm on 9/29, recommending discontinue of Eliquis following her fall in addition to furosemide 20mg PO every other day. In the morning, pt was tachycardic with rates in the 130s. Patient received her scheduled Cardizem and metoprolol. Patient was in sustained Afib with RVR to 130s on 10/4. Primary team pursued PE workup on 10/4 with CTA showing negative PE but bilateral pleural effusions. LE dopplers negative for DVT. Patient weaned to room air on 10/5. Patient reports difficulty breathing on 10/6 and was placed on 2LNC.  Patient scheduled for closed reduction of right wrist malalignment 10/7 with local anesthesia.       REVIEW OF SYSTEMS Patient endorsing right wrist pain.   General: No fatigue, decreased apatite, weight loss  HEENT: No cough, throat pain, rhinorrhea hemoptysis    Chest: No shortness of breath, chest pain  Abdomen: No abdominal pain, hematemesis   Genitourinary: No dysuria, No hematuria   Extremities: R wrist pain, splinted and in ACE wraps   Skin: No rashes, ecchymoses purpura, nodules       MEDICATIONS  MEDICATIONS  (STANDING):  acetaminophen     Tablet .. 650 milliGRAM(s) Oral every 6 hours  atorvastatin 20 milliGRAM(s) Oral at bedtime  cyanocobalamin 1000 MICROGram(s) Oral daily  diltiazem    Tablet 60 milliGRAM(s) Oral every 8 hours  enoxaparin Injectable 40 milliGRAM(s) SubCutaneous every 24 hours  furosemide    Tablet 20 milliGRAM(s) Oral <User Schedule>  influenza  Vaccine (HIGH DOSE) 0.5 milliLiter(s) IntraMuscular once  lactated ringers. 1000 milliLiter(s) (100 mL/Hr) IV Continuous <Continuous>  lidocaine 1% Injectable 15 milliLiter(s) Local Injection once  metoprolol succinate ER 50 milliGRAM(s) Oral two times a day  polyethylene glycol 3350 17 Gram(s) Oral two times a day  potassium chloride    Tablet ER 40 milliEquivalent(s) Oral once  senna 2 Tablet(s) Oral at bedtime    MEDICATIONS  (PRN):  traMADol 25 milliGRAM(s) Oral every 8 hours PRN Severe Pain (7 - 10)      ALLERGIES  Allergies    No Known Allergies    Intolerances        PHYSICAL EXAM   Vital Signs Last 24 Hrs  T(C): 36.4 (08 Oct 2024 04:39), Max: 36.7 (07 Oct 2024 16:45)  T(F): 97.6 (08 Oct 2024 04:39), Max: 98 (07 Oct 2024 16:45)  HR: 60 (08 Oct 2024 04:39) (60 - 68)  BP: 138/80 (08 Oct 2024 04:39) (123/69 - 138/80)  BP(mean): --  RR: 18 (08 Oct 2024 04:39) (18 - 18)  SpO2: 96% (08 Oct 2024 04:39) (95% - 97%)    Parameters below as of 08 Oct 2024 04:39  Patient On (Oxygen Delivery Method): room air        T(C): 36.4 (10-08-24 @ 04:39), Max: 36.7 (10-07-24 @ 16:45)  HR: 60 (10-08-24 @ 04:39) (60 - 68)  BP: 138/80 (10-08-24 @ 04:39) (123/69 - 138/80)  RR: 18 (10-08-24 @ 04:39) (18 - 18)  SpO2: 96% (10-08-24 @ 04:39) (95% - 97%)    CONSTITUTIONAL: Well groomed, no apparent distress  EYES: PERRLA and symmetric, EOMI, No conjunctival or scleral injection  ENMT: Oral mucosa with moist membranes. Normal dentition  NECK: Right retro SCM lipoma, neck without tracheal deviation   RESP: No respiratory distress, no use of accessory muscles; CTA b/l, no WRR  CV: RRR, +S1S2, no MRG; no JVD; no peripheral edema  GI: Soft, NT, ND, no rebound, no guarding  SKIN: No rashes or ulcers noted  NEURO: sensation intact in upper and lower extremities b/l to light touch   PSYCH: Appropriate insight/judgment; A+O x 4, mood and affect appropriate, recent/remote memory intact      LABS                        12.5   4.28  )-----------( 152      ( 08 Oct 2024 05:44 )             37.9       CBC Full  -  ( 08 Oct 2024 05:44 )  WBC Count : 4.28 K/uL  RBC Count : 4.47 M/uL  Hemoglobin : 12.5 g/dL  Hematocrit : 37.9 %  Platelet Count - Automated : 152 K/uL  Mean Cell Volume : 84.8 fl  Mean Cell Hemoglobin : 28.0 pg  Mean Cell Hemoglobin Concentration : 33.0 gm/dL  Auto Neutrophil # : x  Auto Lymphocyte # : x  Auto Monocyte # : x  Auto Eosinophil # : x  Auto Basophil # : x  Auto Neutrophil % : x  Auto Lymphocyte % : x  Auto Monocyte % : x  Auto Eosinophil % : x  Auto Basophil % : x      10-08    141  |  101  |  35.2[H]  ----------------------------<  100[H]  3.4[L]   |  27.0  |  0.82    Ca    8.9      08 Oct 2024 05:44  Phos  4.2     10-08  Mg     2.1     10-08    TPro  6.0[L]  /  Alb  3.8  /  TBili  0.7  /  DBili  x   /  AST  15  /  ALT  16  /  AlkPhos  89  10-08      CAPILLARY BLOOD GLUCOSE              Urinalysis Basic - ( 08 Oct 2024 05:44 )    Color: x / Appearance: x / SG: x / pH: x  Gluc: 100 mg/dL / Ketone: x  / Bili: x / Urobili: x   Blood: x / Protein: x / Nitrite: x   Leuk Esterase: x / RBC: x / WBC x   Sq Epi: x / Non Sq Epi: x / Bacteria: x        IMAGING     < from: Xray Wrist 3 Views, Right (10.07.24 @ 15:30) >  ACC: [id number] EXAM:  XR WRIST COMP MIN 3 VIEWS RT      PROCEDURE DATE:  10/07/2024          INTERPRETATION:  Right wrist. 3 views. Prior imaging showed fractures of   the distal right radius and ulna. Patient was subsequently splendid.    On present examination splinting again noted. Alignment is unchanged from   October 5.    There is significant degeneration of the base of the first metacarpal   again seen.    IMPRESSION: Unchanged alignment.    --- End of Report ---    < end of copied text >

## 2024-10-08 NOTE — PROGRESS NOTE ADULT - PROVIDER SPECIALTY LIST ADULT
Intervent Cardiology
Orthopedics
Internal Medicine
Intervent Cardiology
Cardiology
Internal Medicine
Internal Medicine
Intervent Cardiology
Hospitalist

## 2024-10-08 NOTE — PROGRESS NOTE ADULT - ASSESSMENT
89 y.o female with PMHx left breast carcinoma s/p left mastectomy about 20 years ago, Afib on Eliquis as of 6 months ago, HTN, HLD presented to the ED following a fall at her [facility]. CT Chest notable for right middle lobe consolidation on ED presentation.       #Mechanical Fall   -CT head negative for fracture and hemorrhage   -PT consult recommending Cam[facility] placement      #Rhabdomyolysis   -Initial -704, currently downtrending   -IVF   -serial monitoring     #Right distal radius fracture   -reduced by ortho 9/29  -acetaminophen 650mg prn for pain  -Tramadol 25mg daily for pain  -XRay wrist completed on 10/5 showing non joining of fractured ends   -Ortho intervention scheduled for 10/7 with closed reduction repeated, post procedure Xray showing sustained malalignment     #AHRF secondary to Community Acquired PNA + fluid overload   -CT Chest showing right middle lobe focal consolidation  -on 2L NC now transitioned to room air, back on 2LNC on 10/6 for dyspnea   -Azithromycin 500mg 3 days (End date: 10/1/24)  -Ceftriaxone 1g daily 5 days (End date: 10/3/24)  -IV lasix transitioned to PO 20mg furosemide home dose       #Afib  -Telemetry monitoring   -Metoprolol succinate 25mg BID, increased to 50mg BID per Cards recommendations  -Diltiazem 240 mg daily, decreased to 120mg daily per Cards recs   -Eliquis discontinued due to recent fall    -PE work up including UA, RVP, LE Dopplers, CTA  -CTA negative for PE on 10/4, positive for bilateral pleural effusions  -LE Dopplers negative for thrombi bilaterally on 10/4      #HTN  -diltiazem 240 mg daily, per cardiology recs   -Dosing of Cardizem decreased to 120 mg daily, now decreased to 60mg daily     #HLD   -atorvastatin 20mg bedtime     DVT ppx: SCDs  Dispo: D/c to [facility] once medically stable  89 y.o female with PMHx left breast carcinoma s/p left mastectomy about 20 years ago, Afib on Eliquis as of 6 months ago, HTN, HLD presented to the ED following a fall at her [facility] home. CT Chest notable for right middle lobe consolidation on ED presentation.       #Mechanical Fall   -CT head negative for fracture and hemorrhage   -PT consult recommending Campos placement  -s/p closed reduction of right radial fracture    #Rhabdomyolysis   -Initial -704, currently downtrending   -IVF   -serial monitoring     #Right distal radius fracture   -reduced by ortho 9/29  -acetaminophen 650mg prn for pain  -Tramadol 25mg daily for pain  -XRay wrist completed on 10/5 showing non joining of fractured ends   -Ortho intervention scheduled for 10/7 with closed reduction repeated, outpatient follow up with Dr. Sims     #AHRF secondary to Community Acquired PNA + fluid overload   -CT Chest showing right middle lobe focal consolidation  -on 2L NC now transitioned to room air, back on 2LNC on 10/6 for dyspnea   -Azithromycin 500mg 3 days (End date: 10/1/24)  -Ceftriaxone 1g daily 5 days (End date: 10/3/24)  -IV lasix transitioned to PO 20mg furosemide home dose       #Afib  -Telemetry monitoring   -Metoprolol succinate 25mg BID, increased to 50mg BID per Cards recommendations  -Diltiazem 240 mg daily, decreased to 120mg daily per Cards recs   -Eliquis discontinued due to recent fall    -PE work up including UA, RVP, LE Dopplers, CTA  -CTA negative for PE on 10/4, positive for bilateral pleural effusions  -LE Dopplers negative for thrombi bilaterally on 10/4      #HTN  -diltiazem 240 mg daily, per cardiology recs   -Dosing of Cardizem decreased to 120 mg daily, now decreased to 60mg daily     #HLD   -atorvastatin 20mg bedtime     DVT ppx: SCDs  Dispo: D/c to [facility] once medically stable

## 2024-11-20 NOTE — PATIENT PROFILE ADULT - HOME ACCESSIBILITY CONCERNS
11/20/2024:  [place]: none

## 2025-02-24 NOTE — ED PROVIDER NOTE - DISPOSITION TYPE
----- Message from GIANNA Muhammad sent at 2/21/2025  7:42 AM CST -----  1) TSH is very high 114.  Patient was not taking medication for over a month   She is starting her levothyroxine again   Plan:  labs in 2 - 3 months to make sure we are dosing correctly   2) kidneys are a very early stage 3 -    Plan: avoid NSAIDS and continue to monitor   3) LDL 64 points above   Plan: restarting lipitor should make this at goal           lipid test in 2 to 3 months   4)  b12 is in the low normal range and folate is low   Plan:  start a b complex vitamin daily - sent to [employer] but okay to use over the counter      ADMIT

## 2025-06-02 NOTE — PATIENT PROFILE ADULT - PATIENT REPRESENTATIVE: ( YOU CAN CHOOSE ANY PERSON THAT CAN ASSIST YOU WITH YOUR HEALTH CARE PREFERENCES, DOES NOT HAVE TO BE A SPOUSE, IMMEDIATE FAMILY OR SIGNIFICANT OTHER/PARTNER)
----- Message from Beatriz Holbrook PA-C sent at 6/1/2025  5:38 PM CDT -----  3x1mm bony protuberance with soft tissue swelling at region of concern. Needs further eval with ortho hand specialist. Recommend Dr. Schilling.   declines